# Patient Record
Sex: MALE | Race: WHITE | HISPANIC OR LATINO | Employment: OTHER | URBAN - METROPOLITAN AREA
[De-identification: names, ages, dates, MRNs, and addresses within clinical notes are randomized per-mention and may not be internally consistent; named-entity substitution may affect disease eponyms.]

---

## 2018-08-19 ENCOUNTER — HOSPITAL ENCOUNTER (EMERGENCY)
Facility: MEDICAL CENTER | Age: 58
End: 2018-08-19
Payer: MEDICARE

## 2018-08-19 PROCEDURE — 302449 STATCHG TRIAGE ONLY (STATISTIC)

## 2018-08-19 NOTE — ED NOTES
"Pt in to triage via WC complaining of CP, pt very anxious and uncorropative with the VS procedure. I called for EKG, but pt refused to wait for the procedure. He ripped off his BP cuff, and stated \"I am out of here\". I recommended that the pt stay at least for and EKG, but the pt then stated. \" I recommend that you stop talking\". Will take out of system.  "

## 2018-10-05 ENCOUNTER — HOSPITAL ENCOUNTER (EMERGENCY)
Facility: MEDICAL CENTER | Age: 58
End: 2018-10-05
Attending: EMERGENCY MEDICINE
Payer: MEDICARE

## 2018-10-05 VITALS
HEIGHT: 64 IN | RESPIRATION RATE: 16 BRPM | OXYGEN SATURATION: 99 % | SYSTOLIC BLOOD PRESSURE: 142 MMHG | HEART RATE: 72 BPM | TEMPERATURE: 96.8 F | BODY MASS INDEX: 23.9 KG/M2 | WEIGHT: 140 LBS | DIASTOLIC BLOOD PRESSURE: 98 MMHG

## 2018-10-05 VITALS
HEIGHT: 63 IN | DIASTOLIC BLOOD PRESSURE: 93 MMHG | HEART RATE: 91 BPM | BODY MASS INDEX: 23.44 KG/M2 | TEMPERATURE: 97.7 F | WEIGHT: 132.28 LBS | RESPIRATION RATE: 20 BRPM | OXYGEN SATURATION: 97 % | SYSTOLIC BLOOD PRESSURE: 151 MMHG

## 2018-10-05 DIAGNOSIS — Z00.8 MEDICAL CLEARANCE FOR PSYCHIATRIC ADMISSION: ICD-10-CM

## 2018-10-05 DIAGNOSIS — R45.851 SUICIDAL IDEATION: ICD-10-CM

## 2018-10-05 DIAGNOSIS — F15.10 METHAMPHETAMINE ABUSE (HCC): ICD-10-CM

## 2018-10-05 LAB
AMPHET UR QL SCN: POSITIVE
APAP SERPL-MCNC: <10 UG/ML (ref 10–30)
BARBITURATES UR QL SCN: NEGATIVE
BENZODIAZ UR QL SCN: NEGATIVE
BZE UR QL SCN: NEGATIVE
CANNABINOIDS UR QL SCN: POSITIVE
ETHANOL BLD-MCNC: 0.04 G/DL
METHADONE UR QL SCN: NEGATIVE
OPIATES UR QL SCN: NEGATIVE
OXYCODONE UR QL SCN: NEGATIVE
PCP UR QL SCN: NEGATIVE
POC BREATHALIZER: 0 PERCENT (ref 0–0.01)
POC BREATHALIZER: 0.02 PERCENT (ref 0–0.01)
PROPOXYPH UR QL SCN: NEGATIVE
SALICYLATES SERPL-MCNC: 0 MG/DL (ref 15–25)

## 2018-10-05 PROCEDURE — 80307 DRUG TEST PRSMV CHEM ANLYZR: CPT

## 2018-10-05 PROCEDURE — 302970 POC BREATHALIZER: Performed by: EMERGENCY MEDICINE

## 2018-10-05 PROCEDURE — A9270 NON-COVERED ITEM OR SERVICE: HCPCS | Performed by: EMERGENCY MEDICINE

## 2018-10-05 PROCEDURE — 700102 HCHG RX REV CODE 250 W/ 637 OVERRIDE(OP): Performed by: EMERGENCY MEDICINE

## 2018-10-05 PROCEDURE — 93005 ELECTROCARDIOGRAM TRACING: CPT | Performed by: EMERGENCY MEDICINE

## 2018-10-05 PROCEDURE — 90791 PSYCH DIAGNOSTIC EVALUATION: CPT

## 2018-10-05 PROCEDURE — 99285 EMERGENCY DEPT VISIT HI MDM: CPT

## 2018-10-05 RX ORDER — LORAZEPAM 1 MG/1
0.5 TABLET ORAL ONCE
Status: COMPLETED | OUTPATIENT
Start: 2018-10-05 | End: 2018-10-05

## 2018-10-05 RX ORDER — QUETIAPINE FUMARATE 100 MG/1
100 TABLET, FILM COATED ORAL 3 TIMES DAILY
Status: DISCONTINUED | OUTPATIENT
Start: 2018-10-05 | End: 2018-10-05 | Stop reason: HOSPADM

## 2018-10-05 RX ORDER — LISINOPRIL 10 MG/1
10 TABLET ORAL DAILY
Status: DISCONTINUED | OUTPATIENT
Start: 2018-10-05 | End: 2018-10-05 | Stop reason: HOSPADM

## 2018-10-05 RX ORDER — LISINOPRIL 20 MG/1
20 TABLET ORAL DAILY
Status: ON HOLD | COMMUNITY
End: 2018-12-29

## 2018-10-05 RX ORDER — BUPROPION HYDROCHLORIDE 150 MG/1
300 TABLET, EXTENDED RELEASE ORAL EVERY MORNING
Status: DISCONTINUED | OUTPATIENT
Start: 2018-10-05 | End: 2018-10-05 | Stop reason: HOSPADM

## 2018-10-05 RX ORDER — GINSENG 100 MG
1 CAPSULE ORAL DAILY
COMMUNITY
End: 2018-11-08 | Stop reason: CLARIF

## 2018-10-05 RX ORDER — LORAZEPAM 1 MG/1
1 TABLET ORAL ONCE
Status: COMPLETED | OUTPATIENT
Start: 2018-10-05 | End: 2018-10-05

## 2018-10-05 RX ADMIN — LORAZEPAM 0.5 MG: 1 TABLET ORAL at 04:37

## 2018-10-05 RX ADMIN — LORAZEPAM 1 MG: 1 TABLET ORAL at 12:25

## 2018-10-05 RX ADMIN — QUETIAPINE FUMARATE 100 MG: 100 TABLET ORAL at 13:21

## 2018-10-05 RX ADMIN — LISINOPRIL 10 MG: 10 TABLET ORAL at 12:25

## 2018-10-05 ASSESSMENT — PAIN SCALES - GENERAL
PAINLEVEL_OUTOF10: 0

## 2018-10-05 ASSESSMENT — LIFESTYLE VARIABLES
DO YOU DRINK ALCOHOL: NO
DO YOU DRINK ALCOHOL: YES
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
## 2018-10-05 NOTE — ED NOTES
Patient resting quietly in room. Respirations even and nonlabored. Sitter at bedside. No acute distress noted.

## 2018-10-05 NOTE — ED NOTES
This RN spoke with Zahra from [facility] in regards to patient admission and transfer back to the facility.Zahra from [facility] states she will call back.

## 2018-10-05 NOTE — DISCHARGE PLANNING
MISA spoke with Yesenia in registration who will send someone to register the pt. MISA to send mental health referral upon completion of registration.
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
## 2018-10-05 NOTE — ED NOTES
Patient currently anxious and restless in his bed. Patient took off b/p cuff and spo2 probe off finger. Provider made aware and order for po ativan obtained and administered as ordered.

## 2018-10-05 NOTE — ED NOTES
This RN spoke with nurse liason for nurse to nurse report at [facility] for possible admission. Nurse liason states [facility] will review and get back in touch with [facility].

## 2018-10-05 NOTE — ED PROVIDER NOTES
ED Provider Addendum    1000 -patient has been seen and evaluated by [facility].  Please refer to her note for complete details.  Legal hold is been completed.    12:19 PM contacted by nursing staff.  Patient is becoming somewhat agitated.  He had 0.5 mg of oral Ativan earlier with some improvement.  After reviewing medication reconciliation, I have reordered his home medications to include Seroquel, Wellbutrin as well as lisinopril.  I will add 1 mg of Ativan orally now too.

## 2018-10-05 NOTE — DISCHARGE PLANNING
Pt has been accepted to [facility] by Dr. Braxton.  David called [facility] and set up transport through [facility] for 1400.  David updated [facility], bedside rn, and completed transfer packet.

## 2018-10-05 NOTE — ED NOTES
Patient resting quietly in room. Sitter at bedside. Respirations even and nonlabored, No acutre distress noted at this time.

## 2018-10-05 NOTE — DISCHARGE PLANNING
Medical Social Work    Referral: Legal Hold    Intervention: Legal Hold Paperwork given to SW by [facility] RN: Tiffany    Legal Hold Initiated: Date: 10/05/2018  Time: 0303    Legal Hold faxed: Date: 10/05/2018  Time: 1015    Patient’s Insurance Listed on Face Sheet: Medicare and FFS    Referrals sent to: [facility], [place], [facility], [facility]    Plan: Patient will transfer to mental health facility once acceptance is obtained.     Confirmation of receipt of referral by phone with: fax confirmation

## 2018-10-06 NOTE — ED NOTES
Pt report received, pt has sitter from [facility] at bedside, pt seen by [facility], pt is waiting medical clearance

## 2018-10-06 NOTE — ED NOTES
Pt resting in bed, worker from [facility] at bedside, pt given urinal and instructed to collect urine, pt verbalizes understanding

## 2018-10-06 NOTE — DISCHARGE PLANNING
Alert Team Note: Received report from day shift Alert Team RN Tiffany, stating patient was evaluated earlier today and transferred to [facility] for psychiatric admission. Patient was returned stating patient needed another medical clearance due to sedated appearance. Consulted with ERP who reports patient is medically cleared and can return to [facility] for continued psychiatric treatment. Consulted with Bedside RN and  for assistance in coordinating patient transfer back to [facility]. Informed [facility] employee who is at bedside, that patient will be cleared to return for psychiatric treatment.    Virginia Reynaga , Ph.D.  Alert Team Therapist
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
## 2018-10-06 NOTE — ED NOTES
Pt ate 50% of box lunch  Pt cleared for transfer back to [facility], pt has worker from [facility] at bedside  Pt waiting transport

## 2018-10-06 NOTE — ED NOTES
Patient alert and oriented x4. Respirations even and nonlabored. Patient up by stretcher using urinal. EMS at bedside to transport patient. Patient ambulatory to EMS stretcher.

## 2018-10-06 NOTE — ED NOTES
Pt stood at bedside but was unable to provide urine specimen, pt is awake to verbal he is able to state his name, that he is at [facility] and the year, pt waiting medical clearance then plan is to return pt to [facility]

## 2018-10-06 NOTE — ED NOTES
Pt waiting [facility] transport to [facility], pt resting in bed, respirations even unlabored, pt appears to be sleeping, sitter from [facility] at bedside for pt safety

## 2018-10-06 NOTE — ED NOTES
Report to [facility] pt to [facility] via ambulance transport, pt oriented to person, place and time at time of transfer,pt left via w/c with [facility] transport team

## 2018-10-06 NOTE — ED NOTES
Pt resting in bed, sitter from [facility] is at the bedside for pt safety pt waiting transport back to [facility]

## 2018-10-06 NOTE — DISCHARGE PLANNING
Medical Social Work     MISA was advised by the ERP that the pt is medically clear to return to [facility]. MISA called and spoke with the nursing supervisor Suzi and advised her that the pt is clear to transfer back to there facility. MISA also asked Suzi if they had the original Legal Hold and she verified that they do have the original Legal Hold. MISA called [facility] and set up transportation with Billy for 2300. Transfer packet complete and placed with the hard chart. MISA notified the RN of the transfer time.

## 2018-11-02 ENCOUNTER — HOSPITAL ENCOUNTER (INPATIENT)
Facility: MEDICAL CENTER | Age: 58
LOS: 4 days | DRG: 392 | End: 2018-11-06
Attending: EMERGENCY MEDICINE | Admitting: INTERNAL MEDICINE
Payer: MEDICARE

## 2018-11-02 ENCOUNTER — APPOINTMENT (OUTPATIENT)
Dept: RADIOLOGY | Facility: MEDICAL CENTER | Age: 58
DRG: 392 | End: 2018-11-02
Attending: EMERGENCY MEDICINE
Payer: MEDICARE

## 2018-11-02 ENCOUNTER — APPOINTMENT (OUTPATIENT)
Dept: RADIOLOGY | Facility: MEDICAL CENTER | Age: 58
DRG: 392 | End: 2018-11-02
Attending: INTERNAL MEDICINE
Payer: MEDICARE

## 2018-11-02 LAB
ALBUMIN SERPL BCP-MCNC: 3.6 G/DL (ref 3.2–4.9)
ALBUMIN/GLOB SERPL: 1.8 G/DL
ALP SERPL-CCNC: 84 U/L (ref 30–99)
ALT SERPL-CCNC: 25 U/L (ref 2–50)
ANION GAP SERPL CALC-SCNC: 10 MMOL/L (ref 0–11.9)
APPEARANCE UR: CLEAR
APTT PPP: 30.5 SEC (ref 24.7–36)
AST SERPL-CCNC: 28 U/L (ref 12–45)
BASOPHILS # BLD AUTO: 0.4 % (ref 0–1.8)
BASOPHILS # BLD: 0.07 K/UL (ref 0–0.12)
BILIRUB SERPL-MCNC: 0.6 MG/DL (ref 0.1–1.5)
BILIRUB UR QL STRIP.AUTO: NEGATIVE
BNP SERPL-MCNC: 73 PG/ML (ref 0–100)
BUN SERPL-MCNC: 33 MG/DL (ref 8–22)
CALCIUM SERPL-MCNC: 9 MG/DL (ref 8.5–10.5)
CHLORIDE SERPL-SCNC: 94 MMOL/L (ref 96–112)
CO2 SERPL-SCNC: 23 MMOL/L (ref 20–33)
COLOR UR: YELLOW
CREAT SERPL-MCNC: 1.3 MG/DL (ref 0.5–1.4)
EKG IMPRESSION: NORMAL
EOSINOPHIL # BLD AUTO: 0.14 K/UL (ref 0–0.51)
EOSINOPHIL NFR BLD: 0.8 % (ref 0–6.9)
ERYTHROCYTE [DISTWIDTH] IN BLOOD BY AUTOMATED COUNT: 42.8 FL (ref 35.9–50)
GLOBULIN SER CALC-MCNC: 2 G/DL (ref 1.9–3.5)
GLUCOSE SERPL-MCNC: 199 MG/DL (ref 65–99)
GLUCOSE UR STRIP.AUTO-MCNC: 100 MG/DL
HCT VFR BLD AUTO: 32.8 % (ref 42–52)
HGB BLD-MCNC: 11.5 G/DL (ref 14–18)
IMM GRANULOCYTES # BLD AUTO: 0.11 K/UL (ref 0–0.11)
IMM GRANULOCYTES NFR BLD AUTO: 0.6 % (ref 0–0.9)
INR PPP: 1.08 (ref 0.87–1.13)
KETONES UR STRIP.AUTO-MCNC: ABNORMAL MG/DL
LACTATE BLD-SCNC: 0.8 MMOL/L (ref 0.5–2)
LEUKOCYTE ESTERASE UR QL STRIP.AUTO: NEGATIVE
LIPASE SERPL-CCNC: 121 U/L (ref 11–82)
LYMPHOCYTES # BLD AUTO: 1.52 K/UL (ref 1–4.8)
LYMPHOCYTES NFR BLD: 8.5 % (ref 22–41)
MCH RBC QN AUTO: 31.3 PG (ref 27–33)
MCHC RBC AUTO-ENTMCNC: 35.1 G/DL (ref 33.7–35.3)
MCV RBC AUTO: 89.4 FL (ref 81.4–97.8)
MICRO URNS: ABNORMAL
MONOCYTES # BLD AUTO: 2.19 K/UL (ref 0–0.85)
MONOCYTES NFR BLD AUTO: 12.3 % (ref 0–13.4)
NEUTROPHILS # BLD AUTO: 13.83 K/UL (ref 1.82–7.42)
NEUTROPHILS NFR BLD: 77.4 % (ref 44–72)
NITRITE UR QL STRIP.AUTO: NEGATIVE
NRBC # BLD AUTO: 0 K/UL
NRBC BLD-RTO: 0 /100 WBC
PH UR STRIP.AUTO: 6 [PH]
PLATELET # BLD AUTO: 293 K/UL (ref 164–446)
PMV BLD AUTO: 8.9 FL (ref 9–12.9)
POTASSIUM SERPL-SCNC: 4.6 MMOL/L (ref 3.6–5.5)
PROT SERPL-MCNC: 5.6 G/DL (ref 6–8.2)
PROT UR QL STRIP: NEGATIVE MG/DL
PROTHROMBIN TIME: 14.1 SEC (ref 12–14.6)
RBC # BLD AUTO: 3.67 M/UL (ref 4.7–6.1)
RBC UR QL AUTO: NEGATIVE
SODIUM SERPL-SCNC: 127 MMOL/L (ref 135–145)
SP GR UR STRIP.AUTO: 1.01
T4 FREE SERPL-MCNC: 1.36 NG/DL (ref 0.53–1.43)
TROPONIN I SERPL-MCNC: 0.03 NG/ML (ref 0–0.04)
TROPONIN I SERPL-MCNC: 0.03 NG/ML (ref 0–0.04)
TSH SERPL DL<=0.005 MIU/L-ACNC: 3.52 UIU/ML (ref 0.38–5.33)
UROBILINOGEN UR STRIP.AUTO-MCNC: 0.2 MG/DL
WBC # BLD AUTO: 17.9 K/UL (ref 4.8–10.8)

## 2018-11-02 PROCEDURE — 80053 COMPREHEN METABOLIC PANEL: CPT

## 2018-11-02 PROCEDURE — 81003 URINALYSIS AUTO W/O SCOPE: CPT

## 2018-11-02 PROCEDURE — 83036 HEMOGLOBIN GLYCOSYLATED A1C: CPT

## 2018-11-02 PROCEDURE — 99223 1ST HOSP IP/OBS HIGH 75: CPT | Performed by: INTERNAL MEDICINE

## 2018-11-02 PROCEDURE — 85610 PROTHROMBIN TIME: CPT

## 2018-11-02 PROCEDURE — 76705 ECHO EXAM OF ABDOMEN: CPT

## 2018-11-02 PROCEDURE — 85025 COMPLETE CBC W/AUTO DIFF WBC: CPT

## 2018-11-02 PROCEDURE — 93005 ELECTROCARDIOGRAM TRACING: CPT | Performed by: EMERGENCY MEDICINE

## 2018-11-02 PROCEDURE — 700105 HCHG RX REV CODE 258: Performed by: EMERGENCY MEDICINE

## 2018-11-02 PROCEDURE — 84439 ASSAY OF FREE THYROXINE: CPT

## 2018-11-02 PROCEDURE — 700102 HCHG RX REV CODE 250 W/ 637 OVERRIDE(OP): Performed by: INTERNAL MEDICINE

## 2018-11-02 PROCEDURE — 83690 ASSAY OF LIPASE: CPT

## 2018-11-02 PROCEDURE — 99285 EMERGENCY DEPT VISIT HI MDM: CPT

## 2018-11-02 PROCEDURE — A9270 NON-COVERED ITEM OR SERVICE: HCPCS | Performed by: EMERGENCY MEDICINE

## 2018-11-02 PROCEDURE — A9270 NON-COVERED ITEM OR SERVICE: HCPCS | Performed by: INTERNAL MEDICINE

## 2018-11-02 PROCEDURE — 96375 TX/PRO/DX INJ NEW DRUG ADDON: CPT

## 2018-11-02 PROCEDURE — 83735 ASSAY OF MAGNESIUM: CPT

## 2018-11-02 PROCEDURE — 83605 ASSAY OF LACTIC ACID: CPT

## 2018-11-02 PROCEDURE — 83880 ASSAY OF NATRIURETIC PEPTIDE: CPT

## 2018-11-02 PROCEDURE — 700111 HCHG RX REV CODE 636 W/ 250 OVERRIDE (IP): Performed by: EMERGENCY MEDICINE

## 2018-11-02 PROCEDURE — 84145 PROCALCITONIN (PCT): CPT

## 2018-11-02 PROCEDURE — 87040 BLOOD CULTURE FOR BACTERIA: CPT

## 2018-11-02 PROCEDURE — 770020 HCHG ROOM/CARE - TELE (206)

## 2018-11-02 PROCEDURE — 700105 HCHG RX REV CODE 258: Performed by: INTERNAL MEDICINE

## 2018-11-02 PROCEDURE — 84443 ASSAY THYROID STIM HORMONE: CPT

## 2018-11-02 PROCEDURE — 700111 HCHG RX REV CODE 636 W/ 250 OVERRIDE (IP): Performed by: INTERNAL MEDICINE

## 2018-11-02 PROCEDURE — 700102 HCHG RX REV CODE 250 W/ 637 OVERRIDE(OP): Performed by: EMERGENCY MEDICINE

## 2018-11-02 PROCEDURE — 71045 X-RAY EXAM CHEST 1 VIEW: CPT

## 2018-11-02 PROCEDURE — 84484 ASSAY OF TROPONIN QUANT: CPT

## 2018-11-02 PROCEDURE — 96374 THER/PROPH/DIAG INJ IV PUSH: CPT

## 2018-11-02 PROCEDURE — 93005 ELECTROCARDIOGRAM TRACING: CPT

## 2018-11-02 PROCEDURE — 85730 THROMBOPLASTIN TIME PARTIAL: CPT

## 2018-11-02 PROCEDURE — 36415 COLL VENOUS BLD VENIPUNCTURE: CPT

## 2018-11-02 RX ORDER — AMOXICILLIN 250 MG
2 CAPSULE ORAL 2 TIMES DAILY
Status: DISCONTINUED | OUTPATIENT
Start: 2018-11-03 | End: 2018-11-06 | Stop reason: HOSPADM

## 2018-11-02 RX ORDER — SIMVASTATIN 20 MG
20 TABLET ORAL EVERY EVENING
Status: DISCONTINUED | OUTPATIENT
Start: 2018-11-02 | End: 2018-11-06 | Stop reason: HOSPADM

## 2018-11-02 RX ORDER — BISACODYL 10 MG
10 SUPPOSITORY, RECTAL RECTAL
Status: DISCONTINUED | OUTPATIENT
Start: 2018-11-02 | End: 2018-11-06 | Stop reason: HOSPADM

## 2018-11-02 RX ORDER — ACETAMINOPHEN 325 MG/1
650 TABLET ORAL EVERY 6 HOURS PRN
Status: DISCONTINUED | OUTPATIENT
Start: 2018-11-02 | End: 2018-11-06 | Stop reason: HOSPADM

## 2018-11-02 RX ORDER — POLYETHYLENE GLYCOL 3350 17 G/17G
1 POWDER, FOR SOLUTION ORAL
Status: DISCONTINUED | OUTPATIENT
Start: 2018-11-02 | End: 2018-11-06 | Stop reason: HOSPADM

## 2018-11-02 RX ORDER — ONDANSETRON 2 MG/ML
4 INJECTION INTRAMUSCULAR; INTRAVENOUS ONCE
Status: COMPLETED | OUTPATIENT
Start: 2018-11-02 | End: 2018-11-02

## 2018-11-02 RX ORDER — MORPHINE SULFATE 4 MG/ML
4 INJECTION, SOLUTION INTRAMUSCULAR; INTRAVENOUS ONCE
Status: COMPLETED | OUTPATIENT
Start: 2018-11-02 | End: 2018-11-02

## 2018-11-02 RX ORDER — DEXTROSE MONOHYDRATE 25 G/50ML
25 INJECTION, SOLUTION INTRAVENOUS
Status: DISCONTINUED | OUTPATIENT
Start: 2018-11-02 | End: 2018-11-06 | Stop reason: HOSPADM

## 2018-11-02 RX ORDER — SODIUM CHLORIDE 9 MG/ML
500 INJECTION, SOLUTION INTRAVENOUS ONCE
Status: COMPLETED | OUTPATIENT
Start: 2018-11-02 | End: 2018-11-02

## 2018-11-02 RX ORDER — ONDANSETRON 4 MG/1
4 TABLET, ORALLY DISINTEGRATING ORAL EVERY 4 HOURS PRN
Status: DISCONTINUED | OUTPATIENT
Start: 2018-11-02 | End: 2018-11-06 | Stop reason: HOSPADM

## 2018-11-02 RX ORDER — SODIUM CHLORIDE 9 MG/ML
INJECTION, SOLUTION INTRAVENOUS CONTINUOUS
Status: DISCONTINUED | OUTPATIENT
Start: 2018-11-02 | End: 2018-11-04

## 2018-11-02 RX ORDER — SODIUM CHLORIDE 9 MG/ML
500 INJECTION, SOLUTION INTRAVENOUS
Status: DISCONTINUED | OUTPATIENT
Start: 2018-11-02 | End: 2018-11-06 | Stop reason: HOSPADM

## 2018-11-02 RX ORDER — ONDANSETRON 2 MG/ML
4 INJECTION INTRAMUSCULAR; INTRAVENOUS EVERY 4 HOURS PRN
Status: DISCONTINUED | OUTPATIENT
Start: 2018-11-02 | End: 2018-11-06 | Stop reason: HOSPADM

## 2018-11-02 RX ORDER — BUPROPION HYDROCHLORIDE 150 MG/1
150 TABLET, EXTENDED RELEASE ORAL 2 TIMES DAILY
Status: DISCONTINUED | OUTPATIENT
Start: 2018-11-02 | End: 2018-11-06 | Stop reason: HOSPADM

## 2018-11-02 RX ORDER — CLOPIDOGREL BISULFATE 75 MG/1
75 TABLET ORAL DAILY
Status: DISCONTINUED | OUTPATIENT
Start: 2018-11-03 | End: 2018-11-06 | Stop reason: HOSPADM

## 2018-11-02 RX ORDER — QUETIAPINE FUMARATE 25 MG/1
100 TABLET, FILM COATED ORAL 3 TIMES DAILY
Status: DISCONTINUED | OUTPATIENT
Start: 2018-11-03 | End: 2018-11-05

## 2018-11-02 RX ORDER — LISINOPRIL 20 MG/1
20 TABLET ORAL DAILY
Status: DISCONTINUED | OUTPATIENT
Start: 2018-11-03 | End: 2018-11-06 | Stop reason: HOSPADM

## 2018-11-02 RX ORDER — OXYCODONE HCL 10 MG/1
10 TABLET, FILM COATED, EXTENDED RELEASE ORAL EVERY 12 HOURS
Status: DISCONTINUED | OUTPATIENT
Start: 2018-11-02 | End: 2018-11-05

## 2018-11-02 RX ORDER — ACETAMINOPHEN 500 MG
500 TABLET ORAL
COMMUNITY
End: 2018-11-08 | Stop reason: CLARIF

## 2018-11-02 RX ADMIN — OXYCODONE HYDROCHLORIDE 10 MG: 10 TABLET, FILM COATED, EXTENDED RELEASE ORAL at 23:41

## 2018-11-02 RX ADMIN — SIMVASTATIN 20 MG: 20 TABLET, FILM COATED ORAL at 23:41

## 2018-11-02 RX ADMIN — SODIUM CHLORIDE: 9 INJECTION, SOLUTION INTRAVENOUS at 23:42

## 2018-11-02 RX ADMIN — LIDOCAINE HYDROCHLORIDE 30 ML: 20 SOLUTION OROPHARYNGEAL at 19:12

## 2018-11-02 RX ADMIN — ACETAMINOPHEN 650 MG: 325 TABLET, FILM COATED ORAL at 23:41

## 2018-11-02 RX ADMIN — ONDANSETRON HYDROCHLORIDE 4 MG: 2 SOLUTION INTRAMUSCULAR; INTRAVENOUS at 18:13

## 2018-11-02 RX ADMIN — BUPROPION HYDROCHLORIDE 150 MG: 150 TABLET, EXTENDED RELEASE ORAL at 23:41

## 2018-11-02 RX ADMIN — ONDANSETRON 4 MG: 2 INJECTION INTRAMUSCULAR; INTRAVENOUS at 23:41

## 2018-11-02 RX ADMIN — SODIUM CHLORIDE 500 ML: 9 INJECTION, SOLUTION INTRAVENOUS at 19:58

## 2018-11-02 RX ADMIN — MORPHINE SULFATE 4 MG: 4 INJECTION INTRAVENOUS at 18:13

## 2018-11-02 ASSESSMENT — COGNITIVE AND FUNCTIONAL STATUS - GENERAL
MOBILITY SCORE: 24
SUGGESTED CMS G CODE MODIFIER MOBILITY: CH
SUGGESTED CMS G CODE MODIFIER DAILY ACTIVITY: CH
DAILY ACTIVITIY SCORE: 24
SUGGESTED CMS G CODE MODIFIER DAILY ACTIVITY: CH
DAILY ACTIVITIY SCORE: 24

## 2018-11-02 ASSESSMENT — PAIN SCALES - GENERAL
PAINLEVEL_OUTOF10: 8
PAINLEVEL_OUTOF10: 6

## 2018-11-02 ASSESSMENT — COPD QUESTIONNAIRES
DURING THE PAST 4 WEEKS HOW MUCH DID YOU FEEL SHORT OF BREATH: NONE/LITTLE OF THE TIME
COPD SCREENING SCORE: 3
HAVE YOU SMOKED AT LEAST 100 CIGARETTES IN YOUR ENTIRE LIFE: YES
DO YOU EVER COUGH UP ANY MUCUS OR PHLEGM?: NO/ONLY WITH OCCASIONAL COLDS OR INFECTIONS

## 2018-11-02 ASSESSMENT — LIFESTYLE VARIABLES
EVER_SMOKED: YES
EVER_SMOKED: YES

## 2018-11-02 ASSESSMENT — PATIENT HEALTH QUESTIONNAIRE - PHQ9
1. LITTLE INTEREST OR PLEASURE IN DOING THINGS: NOT AT ALL
2. FEELING DOWN, DEPRESSED, IRRITABLE, OR HOPELESS: NOT AT ALL
SUM OF ALL RESPONSES TO PHQ9 QUESTIONS 1 AND 2: 0

## 2018-11-02 ASSESSMENT — PAIN DESCRIPTION - DESCRIPTORS: DESCRIPTORS: SHARP

## 2018-11-03 ENCOUNTER — APPOINTMENT (OUTPATIENT)
Dept: CARDIOLOGY | Facility: MEDICAL CENTER | Age: 58
DRG: 392 | End: 2018-11-03
Attending: INTERNAL MEDICINE
Payer: MEDICARE

## 2018-11-03 ENCOUNTER — APPOINTMENT (OUTPATIENT)
Dept: RADIOLOGY | Facility: MEDICAL CENTER | Age: 58
DRG: 392 | End: 2018-11-03
Attending: HOSPITALIST
Payer: MEDICARE

## 2018-11-03 PROBLEM — F43.10 PTSD (POST-TRAUMATIC STRESS DISORDER): Status: ACTIVE | Noted: 2018-11-03

## 2018-11-03 PROBLEM — R07.9 CHEST PAIN: Status: ACTIVE | Noted: 2018-11-03

## 2018-11-03 PROBLEM — I10 HYPERTENSION: Status: ACTIVE | Noted: 2018-11-03

## 2018-11-03 PROBLEM — I25.10 CAD (CORONARY ARTERY DISEASE): Status: ACTIVE | Noted: 2018-11-03

## 2018-11-03 PROBLEM — K85.90 ACUTE PANCREATITIS: Status: ACTIVE | Noted: 2018-11-03

## 2018-11-03 PROBLEM — D72.829 LEUKOCYTOSIS: Status: ACTIVE | Noted: 2018-11-03

## 2018-11-03 PROBLEM — E87.1 HYPONATREMIA: Status: ACTIVE | Noted: 2018-11-03

## 2018-11-03 LAB
ANION GAP SERPL CALC-SCNC: 4 MMOL/L (ref 0–11.9)
BASOPHILS # BLD AUTO: 0.6 % (ref 0–1.8)
BASOPHILS # BLD: 0.06 K/UL (ref 0–0.12)
BUN SERPL-MCNC: 17 MG/DL (ref 8–22)
CALCIUM SERPL-MCNC: 8.1 MG/DL (ref 8.5–10.5)
CHLORIDE SERPL-SCNC: 101 MMOL/L (ref 96–112)
CO2 SERPL-SCNC: 26 MMOL/L (ref 20–33)
CREAT SERPL-MCNC: 1.1 MG/DL (ref 0.5–1.4)
EKG IMPRESSION: NORMAL
EOSINOPHIL # BLD AUTO: 0.26 K/UL (ref 0–0.51)
EOSINOPHIL NFR BLD: 2.5 % (ref 0–6.9)
ERYTHROCYTE [DISTWIDTH] IN BLOOD BY AUTOMATED COUNT: 45.7 FL (ref 35.9–50)
EST. AVERAGE GLUCOSE BLD GHB EST-MCNC: 180 MG/DL
GLUCOSE BLD-MCNC: 130 MG/DL (ref 65–99)
GLUCOSE BLD-MCNC: 191 MG/DL (ref 65–99)
GLUCOSE BLD-MCNC: 216 MG/DL (ref 65–99)
GLUCOSE SERPL-MCNC: 147 MG/DL (ref 65–99)
HBA1C MFR BLD: 7.9 % (ref 0–5.6)
HCT VFR BLD AUTO: 32.9 % (ref 42–52)
HGB BLD-MCNC: 11 G/DL (ref 14–18)
IMM GRANULOCYTES # BLD AUTO: 0.05 K/UL (ref 0–0.11)
IMM GRANULOCYTES NFR BLD AUTO: 0.5 % (ref 0–0.9)
LIPASE SERPL-CCNC: 266 U/L (ref 11–82)
LYMPHOCYTES # BLD AUTO: 1.76 K/UL (ref 1–4.8)
LYMPHOCYTES NFR BLD: 17.2 % (ref 22–41)
MAGNESIUM SERPL-MCNC: 2.1 MG/DL (ref 1.5–2.5)
MCH RBC QN AUTO: 30.8 PG (ref 27–33)
MCHC RBC AUTO-ENTMCNC: 33.4 G/DL (ref 33.7–35.3)
MCV RBC AUTO: 92.2 FL (ref 81.4–97.8)
MONOCYTES # BLD AUTO: 1.14 K/UL (ref 0–0.85)
MONOCYTES NFR BLD AUTO: 11.1 % (ref 0–13.4)
NEUTROPHILS # BLD AUTO: 6.98 K/UL (ref 1.82–7.42)
NEUTROPHILS NFR BLD: 68.1 % (ref 44–72)
NRBC # BLD AUTO: 0 K/UL
NRBC BLD-RTO: 0 /100 WBC
PLATELET # BLD AUTO: 273 K/UL (ref 164–446)
PMV BLD AUTO: 8.8 FL (ref 9–12.9)
POTASSIUM SERPL-SCNC: 4 MMOL/L (ref 3.6–5.5)
PROCALCITONIN SERPL-MCNC: 0.11 NG/ML
RBC # BLD AUTO: 3.57 M/UL (ref 4.7–6.1)
SODIUM SERPL-SCNC: 131 MMOL/L (ref 135–145)
TROPONIN I SERPL-MCNC: 0.02 NG/ML (ref 0–0.04)
WBC # BLD AUTO: 10.3 K/UL (ref 4.8–10.8)

## 2018-11-03 PROCEDURE — 99233 SBSQ HOSP IP/OBS HIGH 50: CPT | Performed by: HOSPITALIST

## 2018-11-03 PROCEDURE — 700102 HCHG RX REV CODE 250 W/ 637 OVERRIDE(OP): Performed by: HOSPITALIST

## 2018-11-03 PROCEDURE — 700111 HCHG RX REV CODE 636 W/ 250 OVERRIDE (IP)

## 2018-11-03 PROCEDURE — 93306 TTE W/DOPPLER COMPLETE: CPT | Mod: 26 | Performed by: INTERNAL MEDICINE

## 2018-11-03 PROCEDURE — 700111 HCHG RX REV CODE 636 W/ 250 OVERRIDE (IP): Performed by: INTERNAL MEDICINE

## 2018-11-03 PROCEDURE — 770020 HCHG ROOM/CARE - TELE (206)

## 2018-11-03 PROCEDURE — 90686 IIV4 VACC NO PRSV 0.5 ML IM: CPT | Performed by: HOSPITALIST

## 2018-11-03 PROCEDURE — 90732 PPSV23 VACC 2 YRS+ SUBQ/IM: CPT | Performed by: HOSPITALIST

## 2018-11-03 PROCEDURE — 700111 HCHG RX REV CODE 636 W/ 250 OVERRIDE (IP): Performed by: HOSPITALIST

## 2018-11-03 PROCEDURE — 700105 HCHG RX REV CODE 258: Performed by: INTERNAL MEDICINE

## 2018-11-03 PROCEDURE — 93010 ELECTROCARDIOGRAM REPORT: CPT | Performed by: INTERNAL MEDICINE

## 2018-11-03 PROCEDURE — 3E0234Z INTRODUCTION OF SERUM, TOXOID AND VACCINE INTO MUSCLE, PERCUTANEOUS APPROACH: ICD-10-PCS | Performed by: HOSPITALIST

## 2018-11-03 PROCEDURE — 74177 CT ABD & PELVIS W/CONTRAST: CPT

## 2018-11-03 PROCEDURE — 80048 BASIC METABOLIC PNL TOTAL CA: CPT

## 2018-11-03 PROCEDURE — 85025 COMPLETE CBC W/AUTO DIFF WBC: CPT

## 2018-11-03 PROCEDURE — 93306 TTE W/DOPPLER COMPLETE: CPT

## 2018-11-03 PROCEDURE — 83690 ASSAY OF LIPASE: CPT

## 2018-11-03 PROCEDURE — 3E02340 INTRODUCTION OF INFLUENZA VACCINE INTO MUSCLE, PERCUTANEOUS APPROACH: ICD-10-PCS | Performed by: HOSPITALIST

## 2018-11-03 PROCEDURE — A9502 TC99M TETROFOSMIN: HCPCS

## 2018-11-03 PROCEDURE — 93005 ELECTROCARDIOGRAM TRACING: CPT | Performed by: INTERNAL MEDICINE

## 2018-11-03 PROCEDURE — A9270 NON-COVERED ITEM OR SERVICE: HCPCS | Performed by: HOSPITALIST

## 2018-11-03 PROCEDURE — A9270 NON-COVERED ITEM OR SERVICE: HCPCS | Performed by: INTERNAL MEDICINE

## 2018-11-03 PROCEDURE — 84484 ASSAY OF TROPONIN QUANT: CPT

## 2018-11-03 PROCEDURE — 700117 HCHG RX CONTRAST REV CODE 255: Performed by: HOSPITALIST

## 2018-11-03 PROCEDURE — 36415 COLL VENOUS BLD VENIPUNCTURE: CPT

## 2018-11-03 PROCEDURE — 82962 GLUCOSE BLOOD TEST: CPT | Mod: 91

## 2018-11-03 PROCEDURE — 700102 HCHG RX REV CODE 250 W/ 637 OVERRIDE(OP): Performed by: INTERNAL MEDICINE

## 2018-11-03 PROCEDURE — 90471 IMMUNIZATION ADMIN: CPT

## 2018-11-03 RX ORDER — HEPARIN SODIUM 5000 [USP'U]/ML
5000 INJECTION, SOLUTION INTRAVENOUS; SUBCUTANEOUS EVERY 8 HOURS
Status: DISCONTINUED | OUTPATIENT
Start: 2018-11-03 | End: 2018-11-06 | Stop reason: HOSPADM

## 2018-11-03 RX ORDER — REGADENOSON 0.08 MG/ML
INJECTION, SOLUTION INTRAVENOUS
Status: COMPLETED
Start: 2018-11-03 | End: 2018-11-03

## 2018-11-03 RX ORDER — NITROGLYCERIN 0.4 MG/1
0.4 TABLET SUBLINGUAL
Status: DISCONTINUED | OUTPATIENT
Start: 2018-11-03 | End: 2018-11-06 | Stop reason: HOSPADM

## 2018-11-03 RX ORDER — HYDROCODONE BITARTRATE AND ACETAMINOPHEN 5; 325 MG/1; MG/1
1-2 TABLET ORAL EVERY 6 HOURS PRN
Status: DISCONTINUED | OUTPATIENT
Start: 2018-11-03 | End: 2018-11-05

## 2018-11-03 RX ADMIN — INSULIN HUMAN 2 UNITS: 100 INJECTION, SOLUTION PARENTERAL at 17:49

## 2018-11-03 RX ADMIN — SIMVASTATIN 20 MG: 20 TABLET, FILM COATED ORAL at 17:45

## 2018-11-03 RX ADMIN — HEPARIN SODIUM 5000 UNITS: 5000 INJECTION, SOLUTION INTRAVENOUS; SUBCUTANEOUS at 14:43

## 2018-11-03 RX ADMIN — INSULIN HUMAN 2 UNITS: 100 INJECTION, SOLUTION PARENTERAL at 09:33

## 2018-11-03 RX ADMIN — CLOPIDOGREL 75 MG: 75 TABLET, FILM COATED ORAL at 05:45

## 2018-11-03 RX ADMIN — SODIUM CHLORIDE: 9 INJECTION, SOLUTION INTRAVENOUS at 22:38

## 2018-11-03 RX ADMIN — BUPROPION HYDROCHLORIDE 150 MG: 150 TABLET, EXTENDED RELEASE ORAL at 05:45

## 2018-11-03 RX ADMIN — QUETIAPINE FUMARATE 100 MG: 25 TABLET ORAL at 05:45

## 2018-11-03 RX ADMIN — REGADENOSON 0.4 MG: 0.08 INJECTION, SOLUTION INTRAVENOUS at 13:53

## 2018-11-03 RX ADMIN — LISINOPRIL 20 MG: 20 TABLET ORAL at 05:45

## 2018-11-03 RX ADMIN — OXYCODONE HYDROCHLORIDE 10 MG: 10 TABLET, FILM COATED, EXTENDED RELEASE ORAL at 17:46

## 2018-11-03 RX ADMIN — HEPARIN SODIUM 5000 UNITS: 5000 INJECTION, SOLUTION INTRAVENOUS; SUBCUTANEOUS at 05:46

## 2018-11-03 RX ADMIN — BUPROPION HYDROCHLORIDE 150 MG: 150 TABLET, EXTENDED RELEASE ORAL at 17:45

## 2018-11-03 RX ADMIN — SODIUM CHLORIDE: 9 INJECTION, SOLUTION INTRAVENOUS at 09:40

## 2018-11-03 RX ADMIN — OXYCODONE HYDROCHLORIDE 10 MG: 10 TABLET, FILM COATED, EXTENDED RELEASE ORAL at 05:44

## 2018-11-03 RX ADMIN — HEPARIN SODIUM 5000 UNITS: 5000 INJECTION, SOLUTION INTRAVENOUS; SUBCUTANEOUS at 21:24

## 2018-11-03 RX ADMIN — IOHEXOL 100 ML: 350 INJECTION, SOLUTION INTRAVENOUS at 19:16

## 2018-11-03 RX ADMIN — HYDROCODONE BITARTRATE AND ACETAMINOPHEN 1 TABLET: 5; 325 TABLET ORAL at 20:08

## 2018-11-03 RX ADMIN — QUETIAPINE FUMARATE 100 MG: 25 TABLET ORAL at 11:03

## 2018-11-03 RX ADMIN — QUETIAPINE FUMARATE 100 MG: 25 TABLET ORAL at 17:45

## 2018-11-03 RX ADMIN — ASPIRIN 81 MG: 81 TABLET, COATED ORAL at 05:45

## 2018-11-03 RX ADMIN — PNEUMOCOCCAL VACCINE POLYVALENT 25 MCG
25; 25; 25; 25; 25; 25; 25; 25; 25; 25; 25; 25; 25; 25; 25; 25; 25; 25; 25; 25; 25; 25; 25 INJECTION, SOLUTION INTRAMUSCULAR; SUBCUTANEOUS at 11:03

## 2018-11-03 RX ADMIN — INSULIN HUMAN 1 UNITS: 100 INJECTION, SOLUTION PARENTERAL at 22:06

## 2018-11-03 RX ADMIN — INFLUENZA A VIRUS A/MICHIGAN/45/2015 X-275 (H1N1) ANTIGEN (FORMALDEHYDE INACTIVATED), INFLUENZA A VIRUS A/SINGAPORE/INFIMH-16-0019/2016 IVR-186 (H3N2) ANTIGEN (FORMALDEHYDE INACTIVATED), INFLUENZA B VIRUS B/PHUKET/3073/2013 ANTIGEN (FORMALDEHYDE INACTIVATED), AND INFLUENZA B VIRUS B/MARYLAND/15/2016 BX-69A ANTIGEN (FORMALDEHYDE INACTIVATED) 0.5 ML: 15; 15; 15; 15 INJECTION, SUSPENSION INTRAMUSCULAR at 09:37

## 2018-11-03 ASSESSMENT — ENCOUNTER SYMPTOMS
HEADACHES: 0
FOCAL WEAKNESS: 0
SPUTUM PRODUCTION: 0
MYALGIAS: 0
CONSTIPATION: 0
DEPRESSION: 0
VOMITING: 0
SPEECH CHANGE: 0
FEVER: 0
EYE DISCHARGE: 0
HEMOPTYSIS: 0
BRUISES/BLEEDS EASILY: 0
WHEEZING: 0
SENSORY CHANGE: 0
NECK PAIN: 0
SHORTNESS OF BREATH: 0
BLOOD IN STOOL: 0
BLURRED VISION: 0
PALPITATIONS: 0
CHILLS: 0
BACK PAIN: 0
WEAKNESS: 0
EYE PAIN: 0
SEIZURES: 0
DIZZINESS: 0
CLAUDICATION: 0
LOSS OF CONSCIOUSNESS: 0
NAUSEA: 0
DIAPHORESIS: 0
FLANK PAIN: 0
ABDOMINAL PAIN: 1
COUGH: 0
SORE THROAT: 0
DIARRHEA: 0

## 2018-11-03 ASSESSMENT — PAIN SCALES - GENERAL
PAINLEVEL_OUTOF10: 4
PAINLEVEL_OUTOF10: 6
PAINLEVEL_OUTOF10: 4
PAINLEVEL_OUTOF10: 6
PAINLEVEL_OUTOF10: 4
PAINLEVEL_OUTOF10: 7

## 2018-11-03 ASSESSMENT — PATIENT HEALTH QUESTIONNAIRE - PHQ9
2. FEELING DOWN, DEPRESSED, IRRITABLE, OR HOPELESS: NOT AT ALL
1. LITTLE INTEREST OR PLEASURE IN DOING THINGS: NOT AT ALL
SUM OF ALL RESPONSES TO PHQ9 QUESTIONS 1 AND 2: 0

## 2018-11-03 ASSESSMENT — LIFESTYLE VARIABLES: SUBSTANCE_ABUSE: 0

## 2018-11-03 NOTE — ED NOTES
Med Rec Updated and Complete per Pt at bedside  Allergies Reviewed and Updated (RXN to Penicillins)  No PO ABX last 30 days    Pt states he was able to obtain his medications since the last time he was here.

## 2018-11-03 NOTE — ED PROVIDER NOTES
ED Provider Note    CHIEF COMPLAINT  Chief Complaint   Patient presents with   • Chest Pain     x 2 hours       HPI  Rajiv Sam is a 57 y.o. male who presents with left-sided chest pain that is nonradiating starting about 2-3 hours prior to arrival.  Took EMS here for further evaluation.  Was at [facility] eating at the time of onset.  Was not with exertion.  Reports prior history of CAD and recent stent procedure at [place] about 2 months ago.  Has been compliant with his medications though earlier today lost his medications with his backpack.  Patient continues to smoke cigarettes.  Denies any alcohol use today.  Denies vomiting.  No fevers or recent illness.  No shortness of breath.  No abdominal pain.  No constipation or diarrhea.  No bloody stool or black stool.  No dysuria or hematuria or back pain.  No history of trauma.    The patient was given aspirin prior to arrival by EMS, 324 mg chewable.    REVIEW OF SYSTEMS  See HPI for further details. All other systems are negative.     PAST MEDICAL HISTORY   has a past medical history of Hypertension; Old MI (myocardial infarction); Psychiatric disorder; and PTSD (post-traumatic stress disorder).    SOCIAL HISTORY  Social History     Social History Main Topics   • Smoking status: Current Every Day Smoker   • Smokeless tobacco: Never Used   • Alcohol use No   • Drug use: Yes     Types: Inhaled      Comment: pot   • Sexual activity: Not on file       SURGICAL HISTORY  patient denies any surgical history    CURRENT MEDICATIONS  Home Medications     Reviewed by Rima Abbott R.N. (Registered Nurse) on 11/02/18 at 1735  Med List Status: Not Addressed   Medication Last Dose Status   aspirin EC (ECOTRIN) 81 MG Tablet Delayed Response  Active   buPROPion (WELLBUTRIN XL) 300 MG XL tablet 2 weeks Active   lisinopril (PRINIVIL) 20 MG Tab  Active   oxyCODONE CR (OXYCONTIN) 10 MG Tablet Extended Release 12 hour Abuse-Deterrent 2 weeks Active   quetiapine  (SEROQUEL) 100 MG Tab 2 weeks Active   Zinc 50 MG Tab  Active                ALLERGIES  Allergies   Allergen Reactions   • Pcn [Penicillins]        PHYSICAL EXAM  VITAL SIGNS: /86   Pulse (!) 108   Temp 37.3 °C (99.1 °F)   Resp 20   Wt 62 kg (136 lb 11 oz)   SpO2 99%   BMI 23.46 kg/m²   Pulse ox interpretation: I interpret this pulse ox as normal.  Constitutional: Alert in no apparent distress.  HENT: No signs of trauma, Bilateral external ears normal, Nose normal.   Eyes: Pupils are equal and reactive, Conjunctiva normal, Non-icteric.   Neck: Normal range of motion, No tenderness, Supple, No stridor.   Cardiovascular: Regular rate and rhythm, no murmurs.   Thorax & Lungs: Normal breath sounds, No respiratory distress, No wheezing, No chest tenderness.   Abdomen: Bowel sounds normal, Soft, No tenderness, No masses, No pulsatile masses. No peritoneal signs.  Skin: Warm, Dry, No erythema, No rash.   Back: No bony tenderness, No CVA tenderness.   Extremities: Intact distal pulses, No edema, No tenderness, No cyanosis  Musculoskeletal: Good range of motion in all major joints. No tenderness to palpation or major deformities noted.   Neurologic: Alert , Normal motor function and gait, Normal sensory function, No focal deficits noted.       DIAGNOSTIC STUDIES / PROCEDURES    EKG  11/2/2018 at 5:32 PM  Sinus tachycardia 103  MS, QRS, QTC within normal limits  Incomplete right bundle branch block morphology  Inferior Q waves  No T wave abnormalities  No ST elevations or depressions  Unchanged from prior EKG on 10/5/2018    LABS  Labs Reviewed   CBC WITH DIFFERENTIAL - Abnormal; Notable for the following:        Result Value    WBC 17.9 (*)     RBC 3.67 (*)     Hemoglobin 11.5 (*)     Hematocrit 32.8 (*)     MPV 8.9 (*)     Neutrophils-Polys 77.40 (*)     Lymphocytes 8.50 (*)     Neutrophils (Absolute) 13.83 (*)     Monos (Absolute) 2.19 (*)     All other components within normal limits    Narrative:     Indicate  which anticoagulants the patient is on:->UNKNOWN   COMP METABOLIC PANEL - Abnormal; Notable for the following:     Sodium 127 (*)     Chloride 94 (*)     Glucose 199 (*)     Bun 33 (*)     Total Protein 5.6 (*)     All other components within normal limits    Narrative:     Indicate which anticoagulants the patient is on:->UNKNOWN   LIPASE - Abnormal; Notable for the following:     Lipase 121 (*)     All other components within normal limits    Narrative:     Indicate which anticoagulants the patient is on:->UNKNOWN   ESTIMATED GFR - Abnormal; Notable for the following:     GFR If Non  57 (*)     All other components within normal limits    Narrative:     Indicate which anticoagulants the patient is on:->UNKNOWN   URINALYSIS - Abnormal; Notable for the following:     Glucose 100 (*)     Ketones Trace (*)     All other components within normal limits    Narrative:     If not done within the last 24 hours       RADIOLOGY  DX-CHEST-LIMITED (1 VIEW)   Final Result         No acute cardiac or pulmonary abnormality is identified.            COURSE & MEDICAL DECISION MAKING  Pertinent Labs & Imaging studies reviewed. (See chart for details)  57 y.o. with left-sided chest pain.  Associated with exertion and no evidence of vomiting.  Denies abdominal pain symptoms.  [place] of symptoms.  He has history of CAD and recent stenting procedure.  Notes that he has been compliant with his Plavix.  Was able to review outside medical records from [place] and the patient did have drug-eluting stent placement in August.  Concern for possible ACS.  Chest x-ray is unremarkable.  No evidence of pneumonia.  No obvious pulmonary findings.  No risk factors for PE.  May be gastric/pancreatic in nature as well such as gastritis, peptic ulcer disease, pancreatitis.  Laboratory studies showing negative troponin.  Did receive aspirin prior to arrival.  EKG without acute ischemic findings.   Unchanged from prior.    Spoke with the hospitalist regarding the admission.  Agreeable with admission.      FINAL IMPRESSION  Chest pain        Electronically signed by: Steve Sotelo, 11/2/2018 5:52 PM

## 2018-11-03 NOTE — ASSESSMENT & PLAN NOTE
Rule out ACS  Continuous cardiac monitoring with serial EKG and troponin  Nuclear medicine cardiac stress test negative  Patient has been given full dose of aspirin  TSH and hemoglobin A1c  Nitro when necessary for chest pain  Check 2D echo

## 2018-11-03 NOTE — ED NOTES
Pt c/o indigestion,  Order received and pt medicated per MAR,  Denies other needs,  Call light in reach

## 2018-11-03 NOTE — H&P
Hospital Medicine History & Physical Note    Date of Service  11/2/2018    Primary Care Physician  Pcp Pt States None    Consultants  None    Code Status  Full code    Chief Complaint  Chest pain    History of Presenting Illness  57 y.o. male with a past medical history of CAD status post stent placement x3 who presented 11/2/2018 with chest pain that started earlier today.  The patient reports left-sided sharp chest pain with radiation to his left shoulder worse with deep breathing.  He denies any relieving factors.  He denies any fevers or chills.  He denies any cough, lightheadedness, nausea, vomiting or diarrhea.  He reports some epigastric and left upper quadrant abdominal pain.  He denies any relieving or exacerbating factors.  He denies drinking alcohol.  He continues to smoke less than half a pack cigarettes per day.  He has been compliant with all his medications.    EKG interpreted by me reveals sinus rhythm with RBBB and LPFB with Q waves in inferior leads  Chest x-ray interpreted by me reveals no acute cardiopulmonary process    Review of Systems  Review of Systems   Constitutional: Positive for malaise/fatigue. Negative for chills, diaphoresis and fever.   HENT: Negative for hearing loss and sore throat.    Eyes: Negative for blurred vision.   Respiratory: Negative for cough, sputum production, shortness of breath and wheezing.    Cardiovascular: Positive for chest pain. Negative for palpitations and leg swelling.   Gastrointestinal: Positive for abdominal pain. Negative for blood in stool, diarrhea, nausea and vomiting.   Genitourinary: Negative for dysuria, flank pain and urgency.   Musculoskeletal: Negative for back pain, joint pain, myalgias and neck pain.   Skin: Negative for rash.   Neurological: Negative for dizziness, focal weakness, seizures and headaches.   Endo/Heme/Allergies: Does not bruise/bleed easily.   Psychiatric/Behavioral: Negative for suicidal ideas.   All other systems reviewed and  are negative.      Past Medical History   has a past medical history of Hypertension; Old MI (myocardial infarction); Psychiatric disorder; and PTSD (post-traumatic stress disorder).    Surgical History  No pertinent surgical history    Family History  No pertinent family history    Social History   reports that he has been smoking.  He has never used smokeless tobacco. He reports that he uses drugs, including Inhaled. He reports that he does not drink alcohol.    Allergies  Allergies   Allergen Reactions   • Pcn [Penicillins]        Medications  Prior to Admission Medications   Prescriptions Last Dose Informant Patient Reported? Taking?   Zinc 50 MG Tab  Patient Yes No   Sig: Take 1 Tab by mouth every day.   aspirin EC (ECOTRIN) 81 MG Tablet Delayed Response  Patient Yes No   Sig: Take 81 mg by mouth every day.   buPROPion (WELLBUTRIN XL) 300 MG XL tablet 2 weeks at ran out Patient Yes No   Sig: Take 300 mg by mouth every morning.   lisinopril (PRINIVIL) 20 MG Tab  Patient Yes No   Sig: Take 20 mg by mouth every day.   oxyCODONE CR (OXYCONTIN) 10 MG Tablet Extended Release 12 hour Abuse-Deterrent 2 weeks at ran out Patient Yes No   Sig: Take 10 mg by mouth every 12 hours.   quetiapine (SEROQUEL) 100 MG Tab 2 weeks at ran out Patient Yes No   Sig: Take 100 mg by mouth 3 times a day.      Facility-Administered Medications: None       Physical Exam  Temp:  [37.3 °C (99.1 °F)] 37.3 °C (99.1 °F)  Pulse:  [] 96  Resp:  [20-22] 22  BP: (139)/(86) 139/86    Physical Exam   Constitutional: He is oriented to person, place, and time. He appears well-developed and well-nourished. No distress.   HENT:   Head: Normocephalic and atraumatic.   Mouth/Throat: Oropharynx is clear and moist.   Eyes: Pupils are equal, round, and reactive to light. Conjunctivae are normal. No scleral icterus.   Neck: Normal range of motion. Neck supple.   Cardiovascular: Normal rate, regular rhythm and normal heart sounds.    Pulmonary/Chest:  Effort normal and breath sounds normal. No respiratory distress. He has no wheezes. He has no rales.   Abdominal: Soft. Bowel sounds are normal. He exhibits no distension. There is tenderness (Epigastric and left upper quadrant). There is no rebound.   Musculoskeletal: Normal range of motion. He exhibits no edema or tenderness.   Lymphadenopathy:     He has no cervical adenopathy.   Neurological: He is alert and oriented to person, place, and time. No cranial nerve deficit. Coordination normal.   Skin: Skin is warm. No rash noted.   Psychiatric: He has a normal mood and affect. His behavior is normal.   Nursing note and vitals reviewed.      Laboratory:  Recent Labs      11/02/18   1745   WBC  17.9*   RBC  3.67*   HEMOGLOBIN  11.5*   HEMATOCRIT  32.8*   MCV  89.4   MCH  31.3   MCHC  35.1   RDW  42.8   PLATELETCT  293   MPV  8.9*     Recent Labs      11/02/18   1745   SODIUM  127*   POTASSIUM  4.6   CHLORIDE  94*   CO2  23   GLUCOSE  199*   BUN  33*   CREATININE  1.30   CALCIUM  9.0     Recent Labs      11/02/18   1745   ALTSGPT  25   ASTSGOT  28   ALKPHOSPHAT  84   TBILIRUBIN  0.6   LIPASE  121*   GLUCOSE  199*     Recent Labs      11/02/18   1745   APTT  30.5   INR  1.08     Recent Labs      11/02/18   1745   BNPBTYPENAT  73         Recent Labs      11/02/18   1745   TROPONINI  0.03       Urinalysis:    No results found     Imaging:  US-RUQ   Final Result         1.  Exam within normal limits      DX-CHEST-LIMITED (1 VIEW)   Final Result         No acute cardiac or pulmonary abnormality is identified.      EC-ECHOCARDIOGRAM COMPLETE W/O CONT    (Results Pending)         Assessment/Plan:  I anticipate this patient will require at least two midnights for appropriate medical management, necessitating inpatient admission.    Chest pain- (present on admission)   Assessment & Plan    Rule out ACS  Continuous cardiac monitoring with serial EKG and troponin  Nuclear medicine cardiac stress test in the morning if troponin  remains negative  Patient has been given full dose of aspirin  Check lipid panel, TSH and hemoglobin A1c  Nitro when necessary for chest pain  Check 2D echo          Acute pancreatitis- (present on admission)   Assessment & Plan    Patient reports epigastric abdominal pain  He denies drinking alcohol  Ultrasound abdomen negative for gallbladder pathology  Check triglycerides  IV fluid hydration with normal saline  Pain control with oxycodone        PTSD (post-traumatic stress disorder)- (present on admission)   Assessment & Plan    Continue Wellbutrin        Hypertension- (present on admission)   Assessment & Plan    Continue lisinopril        CAD (coronary artery disease)- (present on admission)   Assessment & Plan    Status post multiple stent placement  Continue aspirin, Plavix and Zocor        Leukocytosis- (present on admission)   Assessment & Plan    Likely reactive to pink otitis  Monitor CBC and vitals        Hyponatremia- (present on admission)   Assessment & Plan    Hypovolemic hyponatremia  IV fluid hydration with normal saline  Monitor BMP and assess response            VTE prophylaxis: heparin

## 2018-11-03 NOTE — PROGRESS NOTES
Beside report completed with Juanito ESTRADA. Pt A&Ox4, complaining of chest pain present on admission. Assessment complete, VSS, tele box in place. No further needs at this time. Bed in locked and low position, call light in reach.

## 2018-11-03 NOTE — PROGRESS NOTES
Report received from NATALIE bey. Pt transported with this RN. Oriented to unit. Tele box placed. Safety precautions in place. Zofran and tylenol given for pain and nausea.

## 2018-11-03 NOTE — CARE PLAN
Problem: Safety  Goal: Will remain free from falls  Outcome: PROGRESSING AS EXPECTED  Education provided. Safety precautions in place. Pt verbalized understanding.     Problem: Knowledge Deficit  Goal: Knowledge of the prescribed therapeutic regimen will improve  Outcome: PROGRESSING AS EXPECTED  Pt updated on plan of care. Pt verbalized understanding.

## 2018-11-03 NOTE — ED NOTES
"Pt resting quietly,  States pain \"alittle better\",  Call light in reach.  Denies needs at this time  "

## 2018-11-03 NOTE — CARE PLAN
Problem: Safety  Goal: Will remain free from falls  Outcome: PROGRESSING AS EXPECTED  Fall prevention education provided. Pt verbalized understanding, call light in reach, bed in locked and low position.    Problem: Pain Management  Goal: Pain level will decrease to patient's comfort goal  Outcome: PROGRESSING AS EXPECTED  Pt continues to complain of chest pain, relaxation techniques utilized and pt is resting comfortably in bed.

## 2018-11-03 NOTE — ED TRIAGE NOTES
"Rajiv Sam  Chief Complaint   Patient presents with   • Chest Pain     x 2 hours     Pt biba from home, sharp mid sternal chest pain that started 2 hours ago, while \"drinking orange juice\".  VSS.  +SOB, and slight nausea.   On monitor. In gown.    Pt received nitro SL x 1, 243 mg asa (took 81mg this am),  500mL NS, And 100mcg fent IV PTA with very little relief.        "

## 2018-11-03 NOTE — PROGRESS NOTES
2 RN skin check done with NATALIE Wilde:  Elbows pink and blanching. Healing scabs on bilateral shins. Healing blister on rt heel. Bilateral heels pink and blanching. No other skin issues.

## 2018-11-03 NOTE — ASSESSMENT & PLAN NOTE
Status post multiple stent placement  Continue aspirin, plavix and Zocor  11/4 started metoprolol 25 bid

## 2018-11-03 NOTE — ASSESSMENT & PLAN NOTE
Patient reports epigastric abdominal pain  He denies drinking alcohol  Ultrasound abdomen negative for gallbladder pathology  IV fluid hydration with normal saline  Pain control with oxycodone  11/4:  Imaging with CT abdomen/pelvis with contrast no acute pancreatitis.  Lipid panel wnl  Lipase mildly elevated at 126.  Advanced diet full liquid to GI soft.

## 2018-11-04 LAB
ALBUMIN SERPL BCP-MCNC: 3.2 G/DL (ref 3.2–4.9)
ALBUMIN/GLOB SERPL: 1.7 G/DL
ALP SERPL-CCNC: 75 U/L (ref 30–99)
ALT SERPL-CCNC: 22 U/L (ref 2–50)
ANION GAP SERPL CALC-SCNC: 4 MMOL/L (ref 0–11.9)
AST SERPL-CCNC: 14 U/L (ref 12–45)
BILIRUB SERPL-MCNC: 0.3 MG/DL (ref 0.1–1.5)
BUN SERPL-MCNC: 10 MG/DL (ref 8–22)
CALCIUM SERPL-MCNC: 8.5 MG/DL (ref 8.5–10.5)
CHLORIDE SERPL-SCNC: 104 MMOL/L (ref 96–112)
CHOLEST SERPL-MCNC: 107 MG/DL (ref 100–199)
CO2 SERPL-SCNC: 25 MMOL/L (ref 20–33)
CREAT SERPL-MCNC: 1.11 MG/DL (ref 0.5–1.4)
GLOBULIN SER CALC-MCNC: 1.9 G/DL (ref 1.9–3.5)
GLUCOSE BLD-MCNC: 151 MG/DL (ref 65–99)
GLUCOSE BLD-MCNC: 166 MG/DL (ref 65–99)
GLUCOSE BLD-MCNC: 167 MG/DL (ref 65–99)
GLUCOSE BLD-MCNC: 196 MG/DL (ref 65–99)
GLUCOSE SERPL-MCNC: 155 MG/DL (ref 65–99)
HDLC SERPL-MCNC: 43 MG/DL
LDLC SERPL CALC-MCNC: 46 MG/DL
LIPASE SERPL-CCNC: 125 U/L (ref 11–82)
LV EJECT FRACT  99904: 70
LV EJECT FRACT MOD 2C 99903: 64.1
LV EJECT FRACT MOD 4C 99902: 81.62
LV EJECT FRACT MOD BP 99901: 74.03
POTASSIUM SERPL-SCNC: 4.3 MMOL/L (ref 3.6–5.5)
PROT SERPL-MCNC: 5.1 G/DL (ref 6–8.2)
SODIUM SERPL-SCNC: 133 MMOL/L (ref 135–145)
TRIGL SERPL-MCNC: 90 MG/DL (ref 0–149)

## 2018-11-04 PROCEDURE — 82962 GLUCOSE BLOOD TEST: CPT

## 2018-11-04 PROCEDURE — 700102 HCHG RX REV CODE 250 W/ 637 OVERRIDE(OP): Performed by: HOSPITALIST

## 2018-11-04 PROCEDURE — 770020 HCHG ROOM/CARE - TELE (206)

## 2018-11-04 PROCEDURE — 99232 SBSQ HOSP IP/OBS MODERATE 35: CPT | Performed by: HOSPITALIST

## 2018-11-04 PROCEDURE — 80053 COMPREHEN METABOLIC PANEL: CPT

## 2018-11-04 PROCEDURE — 700105 HCHG RX REV CODE 258: Performed by: INTERNAL MEDICINE

## 2018-11-04 PROCEDURE — 36415 COLL VENOUS BLD VENIPUNCTURE: CPT

## 2018-11-04 PROCEDURE — A9270 NON-COVERED ITEM OR SERVICE: HCPCS | Performed by: INTERNAL MEDICINE

## 2018-11-04 PROCEDURE — A9270 NON-COVERED ITEM OR SERVICE: HCPCS | Performed by: HOSPITALIST

## 2018-11-04 PROCEDURE — 700111 HCHG RX REV CODE 636 W/ 250 OVERRIDE (IP): Performed by: INTERNAL MEDICINE

## 2018-11-04 PROCEDURE — 700102 HCHG RX REV CODE 250 W/ 637 OVERRIDE(OP): Performed by: INTERNAL MEDICINE

## 2018-11-04 PROCEDURE — 83690 ASSAY OF LIPASE: CPT

## 2018-11-04 PROCEDURE — 80061 LIPID PANEL: CPT

## 2018-11-04 RX ADMIN — HYDROCODONE BITARTRATE AND ACETAMINOPHEN 1 TABLET: 5; 325 TABLET ORAL at 14:36

## 2018-11-04 RX ADMIN — INSULIN HUMAN 1 UNITS: 100 INJECTION, SOLUTION PARENTERAL at 18:37

## 2018-11-04 RX ADMIN — HYDROCODONE BITARTRATE AND ACETAMINOPHEN 1 TABLET: 5; 325 TABLET ORAL at 01:30

## 2018-11-04 RX ADMIN — SODIUM CHLORIDE: 9 INJECTION, SOLUTION INTRAVENOUS at 08:31

## 2018-11-04 RX ADMIN — QUETIAPINE FUMARATE 100 MG: 25 TABLET ORAL at 18:27

## 2018-11-04 RX ADMIN — OXYCODONE HYDROCHLORIDE 10 MG: 10 TABLET, FILM COATED, EXTENDED RELEASE ORAL at 05:04

## 2018-11-04 RX ADMIN — BUPROPION HYDROCHLORIDE 150 MG: 150 TABLET, EXTENDED RELEASE ORAL at 05:03

## 2018-11-04 RX ADMIN — INSULIN HUMAN 1 UNITS: 100 INJECTION, SOLUTION PARENTERAL at 20:20

## 2018-11-04 RX ADMIN — QUETIAPINE FUMARATE 100 MG: 25 TABLET ORAL at 05:04

## 2018-11-04 RX ADMIN — QUETIAPINE FUMARATE 100 MG: 25 TABLET ORAL at 12:52

## 2018-11-04 RX ADMIN — HYDROCODONE BITARTRATE AND ACETAMINOPHEN 1 TABLET: 5; 325 TABLET ORAL at 08:31

## 2018-11-04 RX ADMIN — HEPARIN SODIUM 5000 UNITS: 5000 INJECTION, SOLUTION INTRAVENOUS; SUBCUTANEOUS at 20:24

## 2018-11-04 RX ADMIN — CLOPIDOGREL 75 MG: 75 TABLET, FILM COATED ORAL at 05:04

## 2018-11-04 RX ADMIN — BUPROPION HYDROCHLORIDE 150 MG: 150 TABLET, EXTENDED RELEASE ORAL at 18:27

## 2018-11-04 RX ADMIN — HEPARIN SODIUM 5000 UNITS: 5000 INJECTION, SOLUTION INTRAVENOUS; SUBCUTANEOUS at 05:03

## 2018-11-04 RX ADMIN — OXYCODONE HYDROCHLORIDE 10 MG: 10 TABLET, FILM COATED, EXTENDED RELEASE ORAL at 18:26

## 2018-11-04 RX ADMIN — HEPARIN SODIUM 5000 UNITS: 5000 INJECTION, SOLUTION INTRAVENOUS; SUBCUTANEOUS at 14:29

## 2018-11-04 RX ADMIN — ASPIRIN 81 MG: 81 TABLET, COATED ORAL at 05:04

## 2018-11-04 RX ADMIN — SIMVASTATIN 20 MG: 20 TABLET, FILM COATED ORAL at 18:27

## 2018-11-04 RX ADMIN — METOPROLOL TARTRATE 25 MG: 25 TABLET, FILM COATED ORAL at 18:27

## 2018-11-04 RX ADMIN — METOPROLOL TARTRATE 25 MG: 25 TABLET, FILM COATED ORAL at 12:52

## 2018-11-04 RX ADMIN — LISINOPRIL 20 MG: 20 TABLET ORAL at 05:04

## 2018-11-04 RX ADMIN — STANDARDIZED SENNA CONCENTRATE AND DOCUSATE SODIUM 2 TABLET: 8.6; 5 TABLET, FILM COATED ORAL at 05:04

## 2018-11-04 ASSESSMENT — ENCOUNTER SYMPTOMS
SORE THROAT: 0
SPEECH CHANGE: 0
BACK PAIN: 0
DIZZINESS: 0
COUGH: 0
MYALGIAS: 0
HEMOPTYSIS: 0
FOCAL WEAKNESS: 0
DIAPHORESIS: 0
BRUISES/BLEEDS EASILY: 0
EYE PAIN: 0
NAUSEA: 0
SPUTUM PRODUCTION: 0
CHILLS: 0
CONSTIPATION: 0
EYE DISCHARGE: 0
DEPRESSION: 0
LOSS OF CONSCIOUSNESS: 0
FEVER: 0
HEADACHES: 0
SHORTNESS OF BREATH: 0
ABDOMINAL PAIN: 1
NECK PAIN: 0
WEAKNESS: 0
PALPITATIONS: 0
DIARRHEA: 0
WHEEZING: 0
VOMITING: 0
SENSORY CHANGE: 0
CLAUDICATION: 0

## 2018-11-04 ASSESSMENT — PAIN SCALES - GENERAL
PAINLEVEL_OUTOF10: 3
PAINLEVEL_OUTOF10: 6
PAINLEVEL_OUTOF10: 4
PAINLEVEL_OUTOF10: 6
PAINLEVEL_OUTOF10: 6

## 2018-11-04 ASSESSMENT — PATIENT HEALTH QUESTIONNAIRE - PHQ9
SUM OF ALL RESPONSES TO PHQ9 QUESTIONS 1 AND 2: 0
1. LITTLE INTEREST OR PLEASURE IN DOING THINGS: NOT AT ALL
2. FEELING DOWN, DEPRESSED, IRRITABLE, OR HOPELESS: NOT AT ALL

## 2018-11-04 ASSESSMENT — LIFESTYLE VARIABLES: SUBSTANCE_ABUSE: 0

## 2018-11-04 NOTE — PROGRESS NOTES
Received report from night shift RNHaylie. Discussed plan of care, updated white board. Pt sleeping comfortably in bed, respirations even and unlabored. No distress noted at this time. All needs met. Bed in lowest position. Call light within reach. Will continue to monitor.

## 2018-11-04 NOTE — CARE PLAN
Problem: Communication  Goal: The ability to communicate needs accurately and effectively will improve  Outcome: PROGRESSING AS EXPECTED  Discussed plan of care, reviewed meds with pt, encouraged pt to voice any questions and/or concerns regarding care.       Problem: Safety  Goal: Will remain free from falls  Outcome: PROGRESSING AS EXPECTED  Assessed fall risk, fall precautions initiated. Educated patient on use of call light, no slip socks on, bed lowest position. All needs attended to. Patient verbalized understanding. Pt calls appropriately.      Problem: Pain Management  Goal: Pain level will decrease to patient's comfort goal  Outcome: PROGRESSING AS EXPECTED  Patient educated to pain scale system. Patient encouraged to verbalize discomfort. Patient taught about non-pharmacological pain management. Patient is comfortable at this time without statements of discomfort or pain.      Problem: Respiratory:  Goal: Respiratory status will improve  Outcome: PROGRESSING AS EXPECTED

## 2018-11-04 NOTE — PROGRESS NOTES
[facility] Medicine Daily Progress Note    Date of Service  11/3/2018    Chief Complaint  57 y.o. male admitted 11/2/2018 with epigastric, lower chest pain.    Hospital Course   This 56 yo male with CAD s/p stents x3, current smoker presents with epigastric lower chest pain, found to have elevated lipase on admission, no nausea or emesis.  11/3:  Repeat lipase increased, negative NM stress test.  Pain in epigastrium straight throiugh to back, no h/o alcohol use.  Ordered CT abdomen for imaging pancreas.  states no nausea, ordered full liquid diet.  Check lipid panel in a.m. Repeat lipase.*     Interval Problem Update      Consultants/Specialty  none    Code Status  full    Disposition  Home when medically cleared.    Review of Systems  Review of Systems   Constitutional: Negative for chills, diaphoresis, fever and malaise/fatigue.   HENT: Negative for congestion and sore throat.    Eyes: Negative for pain and discharge.   Respiratory: Negative for cough, hemoptysis, sputum production, shortness of breath and wheezing.    Cardiovascular: Positive for chest pain. Negative for palpitations, claudication and leg swelling.   Gastrointestinal: Positive for abdominal pain. Negative for constipation, diarrhea, melena, nausea and vomiting.   Genitourinary: Negative for dysuria, frequency and urgency.   Musculoskeletal: Negative for back pain, joint pain, myalgias and neck pain.   Skin: Negative for itching and rash.   Neurological: Negative for dizziness, sensory change, speech change, focal weakness, loss of consciousness, weakness and headaches.   Endo/Heme/Allergies: Does not bruise/bleed easily.   Psychiatric/Behavioral: Negative for depression, substance abuse and suicidal ideas.        Physical Exam  Temp:  [36.2 °C (97.1 °F)-36.8 °C (98.2 °F)] 36.2 °C (97.2 °F)  Pulse:  [83-96] 96  Resp:  [16-25] 16  BP: ()/(61-91) 116/63    Physical Exam   Constitutional: He is oriented to person, place, and time. He appears  well-developed and well-nourished. No distress.   HENT:   Head: Normocephalic and atraumatic.   Mouth/Throat: Oropharynx is clear and moist. No oropharyngeal exudate.   Eyes: Pupils are equal, round, and reactive to light. Conjunctivae and EOM are normal. Right eye exhibits no discharge. Left eye exhibits no discharge. No scleral icterus.   Neck: Normal range of motion. Neck supple. No JVD present. No tracheal deviation present. No thyromegaly present.   Cardiovascular: Normal rate, regular rhythm and normal heart sounds.  Exam reveals no gallop and no friction rub.    No murmur heard.  Pulmonary/Chest: Effort normal and breath sounds normal. No respiratory distress. He has no wheezes. He has no rales. He exhibits no tenderness.   Abdominal: Soft. Bowel sounds are normal. He exhibits no distension and no mass. There is tenderness (epigastric pain with palpation). There is no rebound and no guarding.   Musculoskeletal: Normal range of motion. He exhibits no edema or tenderness.   Lymphadenopathy:     He has no cervical adenopathy.   Neurological: He is alert and oriented to person, place, and time. No cranial nerve deficit. He exhibits normal muscle tone.   Skin: Skin is warm and dry. No rash noted. He is not diaphoretic. No erythema.   Psychiatric: He has a normal mood and affect. His behavior is normal. Judgment and thought content normal.   Nursing note and vitals reviewed.      Fluids    Intake/Output Summary (Last 24 hours) at 11/03/18 2249  Last data filed at 11/03/18 0800   Gross per 24 hour   Intake              700 ml   Output              500 ml   Net              200 ml       Laboratory  Recent Labs      11/02/18   1745  11/03/18   1135   WBC  17.9*  10.3   RBC  3.67*  3.57*   HEMOGLOBIN  11.5*  11.0*   HEMATOCRIT  32.8*  32.9*   MCV  89.4  92.2   MCH  31.3  30.8   MCHC  35.1  33.4*   RDW  42.8  45.7   PLATELETCT  293  273   MPV  8.9*  8.8*     Recent Labs      11/02/18   1745  11/03/18   1135   SODIUM   127*  131*   POTASSIUM  4.6  4.0   CHLORIDE  94*  101   CO2  23  26   GLUCOSE  199*  147*   BUN  33*  17   CREATININE  1.30  1.10   CALCIUM  9.0  8.1*     Recent Labs      11/02/18   1745   APTT  30.5   INR  1.08     Recent Labs      11/02/18   1745   BNPBTYPENAT  73           Imaging  EC-ECHOCARDIOGRAM COMPLETE W/O CONT         CT-ABDOMEN-PELVIS WITH   Final Result      1.  No acute abnormalities are noted on abdominal CT.  No acute abnormalities are identified on pelvic CT.   Calcified granulomas are noted in the spleen.      2.  Trace right pleural effusion and minimal opacification noted in the right lung base.         NM-CARDIAC STRESS TEST   Final Result      US-RUQ   Final Result         1.  Exam within normal limits      DX-CHEST-LIMITED (1 VIEW)   Final Result         No acute cardiac or pulmonary abnormality is identified.           Assessment/Plan  Chest pain- (present on admission)   Assessment & Plan    Rule out ACS  Continuous cardiac monitoring with serial EKG and troponin  Nuclear medicine cardiac stress test negative  Patient has been given full dose of aspirin  TSH and hemoglobin A1c  Nitro when necessary for chest pain  Check 2D echo          Acute pancreatitis- (present on admission)   Assessment & Plan    Patient reports epigastric abdominal pain  He denies drinking alcohol  Ultrasound abdomen negative for gallbladder pathology  IV fluid hydration with normal saline  Pain control with oxycodone  Advance diet to full liquids.  Imaging with CT abdomen/pelvis with contrast  Lipid panel ordered.        PTSD (post-traumatic stress disorder)- (present on admission)   Assessment & Plan    Continue Wellbutrin        Hypertension- (present on admission)   Assessment & Plan    Continue lisinopril        CAD (coronary artery disease)- (present on admission)   Assessment & Plan    Status post multiple stent placement  Continue aspirin, Plavix and Zocor        Leukocytosis- (present on admission)    Assessment & Plan    Likely reactive  Monitor CBC and vitals        Hyponatremia- (present on admission)   Assessment & Plan    Hypovolemic hyponatremia  IV fluid hydration with normal saline  Monitor BMP and assess response             VTE prophylaxis: heparin

## 2018-11-05 ENCOUNTER — PATIENT OUTREACH (OUTPATIENT)
Dept: HEALTH INFORMATION MANAGEMENT | Facility: OTHER | Age: 58
End: 2018-11-05

## 2018-11-05 PROBLEM — R07.9 CHEST PAIN: Status: RESOLVED | Noted: 2018-11-03 | Resolved: 2018-11-05

## 2018-11-05 PROBLEM — R45.851 SUICIDAL IDEATION: Status: ACTIVE | Noted: 2018-11-05

## 2018-11-05 PROBLEM — Z59.00 HOMELESSNESS: Status: ACTIVE | Noted: 2018-11-05

## 2018-11-05 PROBLEM — Z95.5 H/O HEART ARTERY STENT: Status: ACTIVE | Noted: 2018-11-05

## 2018-11-05 PROBLEM — D72.829 LEUKOCYTOSIS: Status: RESOLVED | Noted: 2018-11-03 | Resolved: 2018-11-05

## 2018-11-05 PROBLEM — F11.90 CHRONIC NARCOTIC USE: Status: ACTIVE | Noted: 2018-11-05

## 2018-11-05 PROBLEM — E87.1 HYPONATREMIA: Status: RESOLVED | Noted: 2018-11-03 | Resolved: 2018-11-05

## 2018-11-05 LAB
ALBUMIN SERPL BCP-MCNC: 3.5 G/DL (ref 3.2–4.9)
ALBUMIN/GLOB SERPL: 1.6 G/DL
ALP SERPL-CCNC: 81 U/L (ref 30–99)
ALT SERPL-CCNC: 22 U/L (ref 2–50)
ANION GAP SERPL CALC-SCNC: 4 MMOL/L (ref 0–11.9)
AST SERPL-CCNC: 14 U/L (ref 12–45)
BASOPHILS # BLD AUTO: 0.5 % (ref 0–1.8)
BASOPHILS # BLD: 0.05 K/UL (ref 0–0.12)
BILIRUB SERPL-MCNC: 0.3 MG/DL (ref 0.1–1.5)
BUN SERPL-MCNC: 11 MG/DL (ref 8–22)
CALCIUM SERPL-MCNC: 9.2 MG/DL (ref 8.5–10.5)
CHLORIDE SERPL-SCNC: 102 MMOL/L (ref 96–112)
CO2 SERPL-SCNC: 27 MMOL/L (ref 20–33)
CREAT SERPL-MCNC: 1.31 MG/DL (ref 0.5–1.4)
EOSINOPHIL # BLD AUTO: 0.52 K/UL (ref 0–0.51)
EOSINOPHIL NFR BLD: 5.6 % (ref 0–6.9)
ERYTHROCYTE [DISTWIDTH] IN BLOOD BY AUTOMATED COUNT: 45.6 FL (ref 35.9–50)
GLOBULIN SER CALC-MCNC: 2.2 G/DL (ref 1.9–3.5)
GLUCOSE BLD-MCNC: 128 MG/DL (ref 65–99)
GLUCOSE BLD-MCNC: 167 MG/DL (ref 65–99)
GLUCOSE BLD-MCNC: 271 MG/DL (ref 65–99)
GLUCOSE BLD-MCNC: 326 MG/DL (ref 65–99)
GLUCOSE SERPL-MCNC: 198 MG/DL (ref 65–99)
HCT VFR BLD AUTO: 33.5 % (ref 42–52)
HGB BLD-MCNC: 11.3 G/DL (ref 14–18)
IMM GRANULOCYTES # BLD AUTO: 0.07 K/UL (ref 0–0.11)
IMM GRANULOCYTES NFR BLD AUTO: 0.8 % (ref 0–0.9)
LIPASE SERPL-CCNC: 166 U/L (ref 11–82)
LYMPHOCYTES # BLD AUTO: 1.23 K/UL (ref 1–4.8)
LYMPHOCYTES NFR BLD: 13.3 % (ref 22–41)
MCH RBC QN AUTO: 31.5 PG (ref 27–33)
MCHC RBC AUTO-ENTMCNC: 33.7 G/DL (ref 33.7–35.3)
MCV RBC AUTO: 93.3 FL (ref 81.4–97.8)
MONOCYTES # BLD AUTO: 1.19 K/UL (ref 0–0.85)
MONOCYTES NFR BLD AUTO: 12.8 % (ref 0–13.4)
NEUTROPHILS # BLD AUTO: 6.22 K/UL (ref 1.82–7.42)
NEUTROPHILS NFR BLD: 67 % (ref 44–72)
NRBC # BLD AUTO: 0 K/UL
NRBC BLD-RTO: 0 /100 WBC
PLATELET # BLD AUTO: 277 K/UL (ref 164–446)
PMV BLD AUTO: 9.2 FL (ref 9–12.9)
POTASSIUM SERPL-SCNC: 4.6 MMOL/L (ref 3.6–5.5)
PROT SERPL-MCNC: 5.7 G/DL (ref 6–8.2)
RBC # BLD AUTO: 3.59 M/UL (ref 4.7–6.1)
SODIUM SERPL-SCNC: 133 MMOL/L (ref 135–145)
WBC # BLD AUTO: 9.3 K/UL (ref 4.8–10.8)

## 2018-11-05 PROCEDURE — 99233 SBSQ HOSP IP/OBS HIGH 50: CPT | Performed by: HOSPITALIST

## 2018-11-05 PROCEDURE — 770001 HCHG ROOM/CARE - MED/SURG/GYN PRIV*

## 2018-11-05 PROCEDURE — 36415 COLL VENOUS BLD VENIPUNCTURE: CPT

## 2018-11-05 PROCEDURE — 99222 1ST HOSP IP/OBS MODERATE 55: CPT | Performed by: PSYCHIATRY & NEUROLOGY

## 2018-11-05 PROCEDURE — 85025 COMPLETE CBC W/AUTO DIFF WBC: CPT

## 2018-11-05 PROCEDURE — A9270 NON-COVERED ITEM OR SERVICE: HCPCS | Performed by: INTERNAL MEDICINE

## 2018-11-05 PROCEDURE — 83690 ASSAY OF LIPASE: CPT

## 2018-11-05 PROCEDURE — 700102 HCHG RX REV CODE 250 W/ 637 OVERRIDE(OP): Performed by: INTERNAL MEDICINE

## 2018-11-05 PROCEDURE — 700111 HCHG RX REV CODE 636 W/ 250 OVERRIDE (IP): Performed by: INTERNAL MEDICINE

## 2018-11-05 PROCEDURE — 700102 HCHG RX REV CODE 250 W/ 637 OVERRIDE(OP): Performed by: STUDENT IN AN ORGANIZED HEALTH CARE EDUCATION/TRAINING PROGRAM

## 2018-11-05 PROCEDURE — 82962 GLUCOSE BLOOD TEST: CPT | Mod: 91

## 2018-11-05 PROCEDURE — A9270 NON-COVERED ITEM OR SERVICE: HCPCS | Performed by: STUDENT IN AN ORGANIZED HEALTH CARE EDUCATION/TRAINING PROGRAM

## 2018-11-05 PROCEDURE — A9270 NON-COVERED ITEM OR SERVICE: HCPCS | Performed by: HOSPITALIST

## 2018-11-05 PROCEDURE — 700102 HCHG RX REV CODE 250 W/ 637 OVERRIDE(OP): Performed by: HOSPITALIST

## 2018-11-05 PROCEDURE — 80053 COMPREHEN METABOLIC PANEL: CPT

## 2018-11-05 RX ORDER — LIDOCAINE 50 MG/G
1 PATCH TOPICAL EVERY 24 HOURS
Qty: 10 PATCH | Refills: 3 | Status: SHIPPED | OUTPATIENT
Start: 2018-11-05 | End: 2018-11-08 | Stop reason: CLARIF

## 2018-11-05 RX ORDER — QUETIAPINE FUMARATE 25 MG/1
200 TABLET, FILM COATED ORAL 2 TIMES DAILY
Status: DISCONTINUED | OUTPATIENT
Start: 2018-11-05 | End: 2018-11-06 | Stop reason: HOSPADM

## 2018-11-05 RX ORDER — GABAPENTIN 300 MG/1
600 CAPSULE ORAL 2 TIMES DAILY
Qty: 60 CAP | Refills: 3 | Status: SHIPPED | OUTPATIENT
Start: 2018-11-05 | End: 2018-11-05

## 2018-11-05 RX ORDER — GABAPENTIN 300 MG/1
600 CAPSULE ORAL 2 TIMES DAILY
Qty: 60 CAP | Refills: 3 | Status: SHIPPED | OUTPATIENT
Start: 2018-11-05 | End: 2018-11-06

## 2018-11-05 RX ORDER — GABAPENTIN 300 MG/1
300 CAPSULE ORAL 3 TIMES DAILY
Status: DISCONTINUED | OUTPATIENT
Start: 2018-11-05 | End: 2018-11-06 | Stop reason: HOSPADM

## 2018-11-05 RX ORDER — METOPROLOL TARTRATE 50 MG/1
50 TABLET, FILM COATED ORAL 2 TIMES DAILY
Qty: 60 TAB | Refills: 3 | Status: SHIPPED | OUTPATIENT
Start: 2018-11-05 | End: 2018-11-08 | Stop reason: CLARIF

## 2018-11-05 RX ORDER — SIMVASTATIN 20 MG
20 TABLET ORAL EVERY EVENING
Qty: 30 TAB | Refills: 3 | Status: ON HOLD | OUTPATIENT
Start: 2018-11-05 | End: 2018-12-28 | Stop reason: CLARIF

## 2018-11-05 RX ORDER — HYDROCODONE BITARTRATE AND ACETAMINOPHEN 5; 325 MG/1; MG/1
1 TABLET ORAL ONCE
Status: DISCONTINUED | OUTPATIENT
Start: 2018-11-05 | End: 2018-11-06 | Stop reason: HOSPADM

## 2018-11-05 RX ADMIN — INSULIN HUMAN 1 UNITS: 100 INJECTION, SOLUTION PARENTERAL at 17:55

## 2018-11-05 RX ADMIN — SIMVASTATIN 20 MG: 20 TABLET, FILM COATED ORAL at 17:58

## 2018-11-05 RX ADMIN — LISINOPRIL 20 MG: 20 TABLET ORAL at 05:47

## 2018-11-05 RX ADMIN — ACETAMINOPHEN 650 MG: 325 TABLET, FILM COATED ORAL at 21:00

## 2018-11-05 RX ADMIN — QUETIAPINE FUMARATE 100 MG: 25 TABLET ORAL at 05:46

## 2018-11-05 RX ADMIN — GABAPENTIN 300 MG: 300 CAPSULE ORAL at 17:59

## 2018-11-05 RX ADMIN — BUPROPION HYDROCHLORIDE 150 MG: 150 TABLET, EXTENDED RELEASE ORAL at 05:47

## 2018-11-05 RX ADMIN — HEPARIN SODIUM 5000 UNITS: 5000 INJECTION, SOLUTION INTRAVENOUS; SUBCUTANEOUS at 21:00

## 2018-11-05 RX ADMIN — CLOPIDOGREL 75 MG: 75 TABLET, FILM COATED ORAL at 05:47

## 2018-11-05 RX ADMIN — OXYCODONE HYDROCHLORIDE 10 MG: 10 TABLET, FILM COATED, EXTENDED RELEASE ORAL at 05:46

## 2018-11-05 RX ADMIN — HEPARIN SODIUM 5000 UNITS: 5000 INJECTION, SOLUTION INTRAVENOUS; SUBCUTANEOUS at 13:27

## 2018-11-05 RX ADMIN — QUETIAPINE FUMARATE 200 MG: 25 TABLET ORAL at 17:59

## 2018-11-05 RX ADMIN — HEPARIN SODIUM 5000 UNITS: 5000 INJECTION, SOLUTION INTRAVENOUS; SUBCUTANEOUS at 05:46

## 2018-11-05 RX ADMIN — BUPROPION HYDROCHLORIDE 150 MG: 150 TABLET, EXTENDED RELEASE ORAL at 17:59

## 2018-11-05 RX ADMIN — METOPROLOL TARTRATE 25 MG: 25 TABLET, FILM COATED ORAL at 17:58

## 2018-11-05 RX ADMIN — HYDROCODONE BITARTRATE AND ACETAMINOPHEN 1 TABLET: 5; 325 TABLET ORAL at 10:06

## 2018-11-05 RX ADMIN — QUETIAPINE FUMARATE 100 MG: 25 TABLET ORAL at 12:00

## 2018-11-05 RX ADMIN — INSULIN HUMAN 4 UNITS: 100 INJECTION, SOLUTION PARENTERAL at 21:05

## 2018-11-05 RX ADMIN — INSULIN HUMAN 3 UNITS: 100 INJECTION, SOLUTION PARENTERAL at 09:05

## 2018-11-05 RX ADMIN — ASPIRIN 81 MG: 81 TABLET, COATED ORAL at 05:47

## 2018-11-05 RX ADMIN — METOPROLOL TARTRATE 25 MG: 25 TABLET, FILM COATED ORAL at 05:46

## 2018-11-05 ASSESSMENT — ENCOUNTER SYMPTOMS
SPUTUM PRODUCTION: 0
DIAPHORESIS: 0
SHORTNESS OF BREATH: 0
LOSS OF CONSCIOUSNESS: 0
PALPITATIONS: 0
DIARRHEA: 0
SORE THROAT: 0
FOCAL WEAKNESS: 0
HEADACHES: 0
WEAKNESS: 0
DIZZINESS: 0
NAUSEA: 0
CLAUDICATION: 0
EYE PAIN: 0
ABDOMINAL PAIN: 1
FEVER: 0
BRUISES/BLEEDS EASILY: 0
CHILLS: 0
BACK PAIN: 0
COUGH: 0
EYE DISCHARGE: 0
HEMOPTYSIS: 0
SPEECH CHANGE: 0
MYALGIAS: 0
NECK PAIN: 0
VOMITING: 0
SENSORY CHANGE: 0
CONSTIPATION: 0
WHEEZING: 0
DEPRESSION: 0

## 2018-11-05 ASSESSMENT — PAIN SCALES - GENERAL
PAINLEVEL_OUTOF10: 2
PAINLEVEL_OUTOF10: 5
PAINLEVEL_OUTOF10: 6
PAINLEVEL_OUTOF10: 9
PAINLEVEL_OUTOF10: 0

## 2018-11-05 ASSESSMENT — LIFESTYLE VARIABLES: SUBSTANCE_ABUSE: 0

## 2018-11-05 ASSESSMENT — PATIENT HEALTH QUESTIONNAIRE - PHQ9
1. LITTLE INTEREST OR PLEASURE IN DOING THINGS: NOT AT ALL
SUM OF ALL RESPONSES TO PHQ9 QUESTIONS 1 AND 2: 0
2. FEELING DOWN, DEPRESSED, IRRITABLE, OR HOPELESS: NOT AT ALL

## 2018-11-05 NOTE — PSYCHIATRY
.BRIEF PSYCHIATRIC CONSULT NOTE: patient seen, full note to follow.  -Legal Hold:extended  -Sitter:yes  -Restrictions:   -phone:no   -visitors:no   -personal items: no   -finger foods required:    -personal clothes:   -Orders Placed: routine  -Plan:continue to follow

## 2018-11-05 NOTE — PROGRESS NOTES
Pt states he has SI with organized plan.     Charge RN & and Dr. Ibarra notified.     1:1 observation initiated with sitter.     Legal Hold paperwork initiated, Psych to be consulted. Will continue to monitor.

## 2018-11-05 NOTE — PROGRESS NOTES
[facility] Medicine Daily Progress Note    Date of Service  11/4/2018    Chief Complaint  57 y.o. male admitted 11/2/2018 with epigastric, lower chest pain.    Hospital Course   This 56 yo male with CAD s/p stents x3, current smoker presents with epigastric lower chest pain, found to have elevated lipase on admission, no nausea or emesis.  11/3:  Repeat lipase increased, negative NM stress test.  Pain in epigastrium straight throiugh to back, no h/o alcohol use.  Ordered CT abdomen for imaging pancreas.  states no nausea, ordered full liquid diet.  Check lipid panel in a.m. Repeat lipase.  11/4:  Repeat lipase 126.  Tolerating increase in diet to GI soft.  Still c/o epigastric pain.  Not ready for dc per patient.  CT abdomen/pelvis negative.  started metoprolol 25 bid for SR  and h/o CAD with stents.*     Interval Problem Update      Consultants/Specialty  none    Code Status  full    Disposition  Home when medically cleared.    Review of Systems  Review of Systems   Constitutional: Negative for chills, diaphoresis, fever and malaise/fatigue.   HENT: Negative for congestion and sore throat.    Eyes: Negative for pain and discharge.   Respiratory: Negative for cough, hemoptysis, sputum production, shortness of breath and wheezing.    Cardiovascular: Positive for chest pain. Negative for palpitations, claudication and leg swelling.   Gastrointestinal: Positive for abdominal pain. Negative for constipation, diarrhea, melena, nausea and vomiting.   Genitourinary: Negative for dysuria, frequency and urgency.   Musculoskeletal: Negative for back pain, joint pain, myalgias and neck pain.   Skin: Negative for itching and rash.   Neurological: Negative for dizziness, sensory change, speech change, focal weakness, loss of consciousness, weakness and headaches.   Endo/Heme/Allergies: Does not bruise/bleed easily.   Psychiatric/Behavioral: Negative for depression, substance abuse and suicidal ideas.        Physical  Exam  Temp:  [36.2 °C (97.2 °F)-37.1 °C (98.8 °F)] 36.3 °C (97.4 °F)  Pulse:  [75-96] 85  Resp:  [16-17] 16  BP: (106-121)/(63-82) 119/78    Physical Exam   Constitutional: He is oriented to person, place, and time. He appears well-developed and well-nourished. No distress.   HENT:   Head: Normocephalic and atraumatic.   Mouth/Throat: Oropharynx is clear and moist. No oropharyngeal exudate.   Eyes: Pupils are equal, round, and reactive to light. Conjunctivae and EOM are normal. Right eye exhibits no discharge. Left eye exhibits no discharge. No scleral icterus.   Neck: Normal range of motion. Neck supple. No JVD present. No tracheal deviation present. No thyromegaly present.   Cardiovascular: Normal rate, regular rhythm and normal heart sounds.  Exam reveals no gallop and no friction rub.    No murmur heard.  Pulmonary/Chest: Effort normal and breath sounds normal. No respiratory distress. He has no wheezes. He has no rales. He exhibits no tenderness.   Abdominal: Soft. Bowel sounds are normal. He exhibits no distension and no mass. There is tenderness (epigastric pain with palpation). There is no rebound and no guarding.   Musculoskeletal: Normal range of motion. He exhibits no edema or tenderness.   Lymphadenopathy:     He has no cervical adenopathy.   Neurological: He is alert and oriented to person, place, and time. No cranial nerve deficit. He exhibits normal muscle tone.   Skin: Skin is warm and dry. No rash noted. He is not diaphoretic. No erythema.   Psychiatric: He has a normal mood and affect. His behavior is normal. Judgment and thought content normal.   Nursing note and vitals reviewed.      Fluids    Intake/Output Summary (Last 24 hours) at 11/04/18 1903  Last data filed at 11/04/18 0600   Gross per 24 hour   Intake             1200 ml   Output                0 ml   Net             1200 ml       Laboratory  Recent Labs      11/02/18   1745  11/03/18   1135   WBC  17.9*  10.3   RBC  3.67*  3.57*    HEMOGLOBIN  11.5*  11.0*   HEMATOCRIT  32.8*  32.9*   MCV  89.4  92.2   MCH  31.3  30.8   MCHC  35.1  33.4*   RDW  42.8  45.7   PLATELETCT  293  273   MPV  8.9*  8.8*     Recent Labs      11/02/18   1745  11/03/18   1135  11/04/18   1148   SODIUM  127*  131*  133*   POTASSIUM  4.6  4.0  4.3   CHLORIDE  94*  101  104   CO2  23  26  25   GLUCOSE  199*  147*  155*   BUN  33*  17  10   CREATININE  1.30  1.10  1.11   CALCIUM  9.0  8.1*  8.5     Recent Labs      11/02/18   1745   APTT  30.5   INR  1.08     Recent Labs      11/02/18   1745   BNPBTYPENAT  73     Recent Labs      11/04/18   0131   TRIGLYCERIDE  90   HDL  43   LDL  46       Imaging  EC-ECHOCARDIOGRAM COMPLETE W/O CONT   Final Result      CT-ABDOMEN-PELVIS WITH   Final Result      1.  No acute abnormalities are noted on abdominal CT.  No acute abnormalities are identified on pelvic CT.   Calcified granulomas are noted in the spleen.      2.  Trace right pleural effusion and minimal opacification noted in the right lung base.         NM-CARDIAC STRESS TEST   Final Result      US-RUQ   Final Result         1.  Exam within normal limits      DX-CHEST-LIMITED (1 VIEW)   Final Result         No acute cardiac or pulmonary abnormality is identified.           Assessment/Plan  Chest pain- (present on admission)   Assessment & Plan    Rule out ACS  Continuous cardiac monitoring with serial EKG and troponin  Nuclear medicine cardiac stress test negative  Patient has been given full dose of aspirin  TSH and hemoglobin A1c  Nitro when necessary for chest pain  Check 2D echo       Acute pancreatitis- (present on admission)   Assessment & Plan    Patient reports epigastric abdominal pain  He denies drinking alcohol  Ultrasound abdomen negative for gallbladder pathology  IV fluid hydration with normal saline  Pain control with oxycodone  11/4:  Imaging with CT abdomen/pelvis with contrast no acute pancreatitis.  Lipid panel wnl  Lipase mildly elevated at 126.  Advanced  diet full liquid to GI soft.       PTSD (post-traumatic stress disorder)- (present on admission)   Assessment & Plan    Continue Wellbutrin     Hypertension- (present on admission)   Assessment & Plan    Continue lisinopril     CAD (coronary artery disease)- (present on admission)   Assessment & Plan    Status post multiple stent placement  Continue aspirin, Plavix and Zocor  11/4 started metoprolol 25 bid     Leukocytosis- (present on admission)   Assessment & Plan    Likely reactive  Monitor CBC and vitals     Hyponatremia- (present on admission)   Assessment & Plan    Hypovolemic hyponatremia  IV fluid hydration with normal saline  Monitor BMP and assess response          VTE prophylaxis: heparin

## 2018-11-05 NOTE — PROGRESS NOTES
Pt says he's feeling depressed, he was DCed from psych hospital 1 week ago and that he needs his psych meds. Pt denies suicidal ideation at this time. Will continue to monitor and pass on to oncoming RN

## 2018-11-05 NOTE — DISCHARGE SUMMARY
Discharge Summary    CHIEF COMPLAINT ON ADMISSION  Chief Complaint   Patient presents with   • Chest Pain     x 2 hours       Reason for Admission  EMS 05     Admission Date  11/2/2018    CODE STATUS  Full Code    HPI & HOSPITAL COURSE     This 58 yo male with CAD s/p stents x3, current smoker presents with epigastric lower chest pain, found to have elevated lipase on admission, no nausea or emesis.  11/3:  Repeat lipase increased, negative NM stress test.  Pain in epigastrium straight throiugh to back, no h/o alcohol use.  Ordered CT abdomen for imaging pancreas.  states no nausea, ordered full liquid diet.  Check lipid panel in a.m. Repeat lipase.  11/4:  Repeat lipase 126.  Tolerating increase in diet to GI soft.  Still c/o epigastric pain.  Not ready for dc per patient.  CT abdomen/pelvis negative.  started metoprolol 25 bid for SR  and h/o CAD with stents.*     The patient requested a regular diet and was tolerating it despite complaining of epigastric pain.  Of note, this is the pain he was complaining of on admission, thought to be chest pain.  Echo normal, NM negative. Nondrinker, us liver normal, CT abdomen normal.  Suspect his mild elevation of lipase at 125 is a side effect of medication or combination of medications.  Reviewed in uptodate seroquel, wellbutrin, but none mention elevated lipase or pancreatitis as side effect.  His is however taking oxycontin bid and therefore, recommend weaning off of this medication and replace with neurontin and lidocaine patches.  He is felt safe for dc since tolerating regular low fat diet, ambulating, stable VS and cardiac disease has been ruled out.  Of note, he had mild ST on monitor and elevated BP, started on metoprolol and zocor, continue asa daily.  Cardiac stents are >1 year out.    Therefore, he is discharged in good and stable condition to home with close outpatient follow-up.    The patient met 2-midnight criteria for an inpatient stay at the time of  discharge.    Discharge Date  11/5/2018    FOLLOW UP ITEMS POST DISCHARGE  Follow up with PCP in 1 week.  Suspect medication side effect causing mild elevation of lipase and epigastric pain.  Recommend to wean off oxycontin, prescribed neurontin and lidocaine patches instead.    DISCHARGE DIAGNOSES  Active Problems:    Acute pancreatitis POA: Yes    CAD (coronary artery disease) POA: Yes    Hypertension POA: Yes    PTSD (post-traumatic stress disorder) POA: Yes    H/O heart artery stent POA: Yes    Chronic narcotic use POA: Yes  Resolved Problems:    Chest pain POA: Yes    Hyponatremia POA: Yes    Leukocytosis POA: Yes      FOLLOW UP  No future appointments.  No follow-up provider specified.    MEDICATIONS ON DISCHARGE     Medication List      START taking these medications      Instructions   gabapentin 300 MG Caps  Commonly known as:  NEURONTIN   Take 2 Caps by mouth 2 Times a Day.  Dose:  600 mg     lidocaine 5 % Ptch  Commonly known as:  LIDODERM   Apply 1 Patch to skin as directed every 24 hours.  Dose:  1 Patch     metoprolol 50 MG Tabs  Commonly known as:  LOPRESSOR   Take 1 Tab by mouth 2 Times a Day.  Dose:  50 mg     simvastatin 20 MG Tabs  Commonly known as:  ZOCOR   Take 1 Tab by mouth every evening.  Dose:  20 mg        CONTINUE taking these medications      Instructions   acetaminophen 500 MG Tabs  Commonly known as:  TYLENOL   Take 500 mg by mouth 1 time daily as needed for Moderate Pain.  Dose:  500 mg     aspirin EC 81 MG Tbec  Commonly known as:  ECOTRIN   Take 81 mg by mouth every day.  Dose:  81 mg     B-12 PO   Take 1 Dose by mouth every day. Unknown OTC Strength  Dose:  1 Dose     lisinopril 20 MG Tabs  Commonly known as:  PRINIVIL   Take 20 mg by mouth every day.  Dose:  20 mg     QUEtiapine 100 MG Tabs  Commonly known as:  SEROQUEL   Take 100 mg by mouth 3 times a day.  Dose:  100 mg     WELLBUTRIN  MG XL tablet  Generic drug:  buPROPion   Take 300 mg by mouth every morning.  Dose:  300  mg     Zinc 50 MG Tabs   Take 1 Tab by mouth every day.  Dose:  1 Tab        STOP taking these medications    oxyCODONE CR 10 MG T12a  Commonly known as:  OXYCONTIN            Allergies  Allergies   Allergen Reactions   • Pcn [Penicillins] Rash     Rash       DIET  Orders Placed This Encounter   Procedures   • Diet Order Low Fiber(GI Soft)     Standing Status:   Standing     Number of Occurrences:   1     Order Specific Question:   Diet:     Answer:   Low Fiber(GI Soft) [2]       ACTIVITY  As tolerated.  Weight bearing as tolerated    CONSULTATIONS  none    PROCEDURES    Transthoracic  Echo Report      Echocardiography Laboratory    CONCLUSIONS  Normal transthoracic echocardiogram.   No prior study is available for comparison.     EVERARDO JACKSON  Exam Date:         11/03/2018                      19:25               Myocardial Perfusion   Report   NUCLEAR IMAGING INTERPRETATION   No evidence of ischemia.   Lateral wall photopenia which is more prominent on rest images may be related      to attenuation artifact.     Normal left ventricular size, ejection fraction, and wall motion.     EVERARDO JACKSON     MRN:    [id number]         Gender:    M     Exam Date: 11/03/2018 12:59        1.  No acute abnormalities are noted on abdominal CT.  No acute abnormalities are identified on pelvic CT.   Calcified granulomas are noted in the spleen.    2.  Trace right pleural effusion and minimal opacification noted in the right lung base.   Reading Provider Reading Date   Steve Qureshi M.D. Nov 3, 2018       11/2/2018 9:31 PM    HISTORY/REASON FOR EXAM:  Pain  Abdominal pain    TECHNIQUE/EXAM DESCRIPTION AND NUMBER OF VIEWS:  Real-time sonography of the liver and biliary tree.    COMPARISON: None    FINDINGS:    The liver is normal in contour. There is no evidence of solid mass lesion. The liver measures 10.51 cm. Shadowing calcification is seen in the posterior right hepatic lobe, commonly associated with granulomatous  change.    The gallbladder is partially contracted. There is no evidence of cholelithiasis.  The gallbladder wall thickness measures 0.29 cm. There is no pericholecystic fluid.    The common duct measures 0.29 cm.    The visualized pancreas is unremarkable.    The visualized aorta is normal in caliber.    Intrahepatic IVC is patent.    The portal vein is patent with hepatopetal flow.    The right kidney measures 10.58 cm.    There is no ascites.   Impression         1.  Exam within normal limits   Reading Provider Reading Date   Gale Stoner M.D. Nov 2, 2018   Signing Provider Signing Date Signing Time   Gale Stoner M.D. Nov 2, 2018 10:24 PM       LABORATORY  Lab Results   Component Value Date    SODIUM 133 (L) 11/05/2018    POTASSIUM 4.6 11/05/2018    CHLORIDE 102 11/05/2018    CO2 27 11/05/2018    GLUCOSE 198 (H) 11/05/2018    BUN 11 11/05/2018    CREATININE 1.31 11/05/2018        Lab Results   Component Value Date    WBC 9.3 11/05/2018    HEMOGLOBIN 11.3 (L) 11/05/2018    HEMATOCRIT 33.5 (L) 11/05/2018    PLATELETCT 277 11/05/2018        Total time of the discharge process exceeds 48 minutes.

## 2018-11-06 VITALS
BODY MASS INDEX: 21.8 KG/M2 | DIASTOLIC BLOOD PRESSURE: 90 MMHG | OXYGEN SATURATION: 98 % | RESPIRATION RATE: 18 BRPM | HEART RATE: 81 BPM | SYSTOLIC BLOOD PRESSURE: 152 MMHG | TEMPERATURE: 99 F | WEIGHT: 126.98 LBS

## 2018-11-06 LAB — GLUCOSE BLD-MCNC: 230 MG/DL (ref 65–99)

## 2018-11-06 PROCEDURE — 82962 GLUCOSE BLOOD TEST: CPT

## 2018-11-06 PROCEDURE — 700102 HCHG RX REV CODE 250 W/ 637 OVERRIDE(OP): Performed by: INTERNAL MEDICINE

## 2018-11-06 PROCEDURE — 700111 HCHG RX REV CODE 636 W/ 250 OVERRIDE (IP): Performed by: INTERNAL MEDICINE

## 2018-11-06 PROCEDURE — A9270 NON-COVERED ITEM OR SERVICE: HCPCS | Performed by: STUDENT IN AN ORGANIZED HEALTH CARE EDUCATION/TRAINING PROGRAM

## 2018-11-06 PROCEDURE — A9270 NON-COVERED ITEM OR SERVICE: HCPCS | Performed by: INTERNAL MEDICINE

## 2018-11-06 PROCEDURE — 700102 HCHG RX REV CODE 250 W/ 637 OVERRIDE(OP): Performed by: HOSPITALIST

## 2018-11-06 PROCEDURE — 700102 HCHG RX REV CODE 250 W/ 637 OVERRIDE(OP): Performed by: STUDENT IN AN ORGANIZED HEALTH CARE EDUCATION/TRAINING PROGRAM

## 2018-11-06 PROCEDURE — A9270 NON-COVERED ITEM OR SERVICE: HCPCS | Performed by: HOSPITALIST

## 2018-11-06 PROCEDURE — 99239 HOSP IP/OBS DSCHRG MGMT >30: CPT | Performed by: INTERNAL MEDICINE

## 2018-11-06 RX ORDER — GABAPENTIN 300 MG/1
300 CAPSULE ORAL 3 TIMES DAILY
Qty: 90 CAP | Status: ON HOLD
Start: 2018-11-06 | End: 2018-12-28 | Stop reason: CLARIF

## 2018-11-06 RX ORDER — QUETIAPINE FUMARATE 200 MG/1
200 TABLET, FILM COATED ORAL 2 TIMES DAILY
Qty: 60 TAB | Refills: 3 | Status: ON HOLD
Start: 2018-11-06 | End: 2019-02-19

## 2018-11-06 RX ADMIN — INSULIN HUMAN 1 UNITS: 100 INJECTION, SOLUTION PARENTERAL at 13:09

## 2018-11-06 RX ADMIN — INSULIN HUMAN 3 UNITS: 100 INJECTION, SOLUTION PARENTERAL at 09:01

## 2018-11-06 RX ADMIN — GABAPENTIN 300 MG: 300 CAPSULE ORAL at 17:18

## 2018-11-06 RX ADMIN — HEPARIN SODIUM 5000 UNITS: 5000 INJECTION, SOLUTION INTRAVENOUS; SUBCUTANEOUS at 06:33

## 2018-11-06 RX ADMIN — ASPIRIN 81 MG: 81 TABLET, COATED ORAL at 06:34

## 2018-11-06 RX ADMIN — METOPROLOL TARTRATE 25 MG: 25 TABLET, FILM COATED ORAL at 17:16

## 2018-11-06 RX ADMIN — SIMVASTATIN 20 MG: 20 TABLET, FILM COATED ORAL at 17:17

## 2018-11-06 RX ADMIN — HEPARIN SODIUM 5000 UNITS: 5000 INJECTION, SOLUTION INTRAVENOUS; SUBCUTANEOUS at 13:05

## 2018-11-06 RX ADMIN — METOPROLOL TARTRATE 25 MG: 25 TABLET, FILM COATED ORAL at 06:34

## 2018-11-06 RX ADMIN — GABAPENTIN 300 MG: 300 CAPSULE ORAL at 06:34

## 2018-11-06 RX ADMIN — INSULIN HUMAN 2 UNITS: 100 INJECTION, SOLUTION PARENTERAL at 17:21

## 2018-11-06 RX ADMIN — CLOPIDOGREL 75 MG: 75 TABLET, FILM COATED ORAL at 06:33

## 2018-11-06 RX ADMIN — LISINOPRIL 20 MG: 20 TABLET ORAL at 06:34

## 2018-11-06 RX ADMIN — QUETIAPINE FUMARATE 200 MG: 25 TABLET ORAL at 17:17

## 2018-11-06 RX ADMIN — STANDARDIZED SENNA CONCENTRATE AND DOCUSATE SODIUM 2 TABLET: 8.6; 5 TABLET, FILM COATED ORAL at 17:18

## 2018-11-06 RX ADMIN — GABAPENTIN 300 MG: 300 CAPSULE ORAL at 13:05

## 2018-11-06 RX ADMIN — BUPROPION HYDROCHLORIDE 150 MG: 150 TABLET, EXTENDED RELEASE ORAL at 17:17

## 2018-11-06 RX ADMIN — BUPROPION HYDROCHLORIDE 150 MG: 150 TABLET, EXTENDED RELEASE ORAL at 06:34

## 2018-11-06 RX ADMIN — QUETIAPINE FUMARATE 200 MG: 25 TABLET ORAL at 06:33

## 2018-11-06 ASSESSMENT — PAIN SCALES - GENERAL
PAINLEVEL_OUTOF10: 4
PAINLEVEL_OUTOF10: 3

## 2018-11-06 NOTE — PROGRESS NOTES
Bedside report received, pt care assumed. Pt a&ox4, watching television. Only current complaints of not being able to turn television himself with remote. 1:1 sitter at bedside for safety. Frequent monitoring and pt within view of nursing station. Will continue to monitor.

## 2018-11-06 NOTE — PROGRESS NOTES
"Pt c/o mid-sternal/expigastic \"constant\" pain 9/10. 650mg tylenol administered for relief. Pt not on cardiac monitoring at this time, vitally stable, watching football. Dr Suarez notified, new orders received, will continue to monitor.   "

## 2018-11-06 NOTE — ASSESSMENT & PLAN NOTE
11/5:  At AL, patient states suicidal and reveals he is currently homeless without any money.  Was living in a van with his GF, GF left him 1 month ago.  He is unable to reach her by phone.

## 2018-11-06 NOTE — PROGRESS NOTES
Pt states he still has suicidal ideation with plan when he leaves the hospital. Pt states he has no where to go, states depression and anxiety r/t buying his girlfriend a van for them to live out of and she broke up with him. 1:1 sitter in place with suicide precautions per order. Will closely monitor.

## 2018-11-06 NOTE — PROGRESS NOTES
[facility] Medicine Daily Progress Note    Date of Service  11/5/2018    Chief Complaint  57 y.o. male admitted 11/2/2018 with epigastric, lower chest pain.    Hospital Course   This 56 yo male with CAD s/p stents x3, current smoker presents with epigastric lower chest pain, found to have elevated lipase on admission, no nausea or emesis.  11/3:  Repeat lipase increased, negative NM stress test.  Pain in epigastrium straight throiugh to back, no h/o alcohol use.  Ordered CT abdomen for imaging pancreas.  states no nausea, ordered full liquid diet.  Check lipid panel in a.m. Repeat lipase.  11/4:  Repeat lipase 126.  Tolerating increase in diet to GI soft.  Still c/o epigastric pain.  Not ready for dc per patient.  CT abdomen/pelvis negative.  started metoprolol 25 bid for SR  and h/o CAD with stents.  11/5:  Nurse was discussing to be discharged today and then told her he was planning to jump off a building.  Apparently, the patient and his girlfriend were living in a van and his GF left him 1 month ago.  He was recently at [facility] 1 week ago for suicidal ideation.  He states he was never on narcotics PTA, therefore, oxycontin was dc'd, home med list needs to be corrected.  He was started on a legal hold, psychiatry consulted.  Patient later requesting pain medication, neurontin 300mg tid ordered.  *     Interval Problem Update      Consultants/Specialty  none    Code Status  full    Disposition  11/5 medically cleared.    Review of Systems  Review of Systems   Constitutional: Negative for chills, diaphoresis, fever and malaise/fatigue.   HENT: Negative for congestion and sore throat.    Eyes: Negative for pain and discharge.   Respiratory: Negative for cough, hemoptysis, sputum production, shortness of breath and wheezing.    Cardiovascular: Positive for chest pain. Negative for palpitations, claudication and leg swelling.   Gastrointestinal: Positive for abdominal pain. Negative for  constipation, diarrhea, melena, nausea and vomiting.   Genitourinary: Negative for dysuria, frequency and urgency.   Musculoskeletal: Negative for back pain, joint pain, myalgias and neck pain.   Skin: Negative for itching and rash.   Neurological: Negative for dizziness, sensory change, speech change, focal weakness, loss of consciousness, weakness and headaches.   Endo/Heme/Allergies: Does not bruise/bleed easily.   Psychiatric/Behavioral: Negative for depression, substance abuse and suicidal ideas.        Physical Exam  Temp:  [36.4 °C (97.6 °F)-37.3 °C (99.2 °F)] 37.3 °C (99.1 °F)  Pulse:  [70-75] 70  Resp:  [16-18] 18  BP: (102-145)/(66-91) 144/87    Physical Exam   Constitutional: He is oriented to person, place, and time. He appears well-developed and well-nourished. No distress.   HENT:   Head: Normocephalic and atraumatic.   Mouth/Throat: Oropharynx is clear and moist. No oropharyngeal exudate.   Eyes: Pupils are equal, round, and reactive to light. Conjunctivae and EOM are normal. Right eye exhibits no discharge. Left eye exhibits no discharge. No scleral icterus.   Neck: Normal range of motion. Neck supple. No JVD present. No tracheal deviation present. No thyromegaly present.   Cardiovascular: Normal rate, regular rhythm and normal heart sounds.  Exam reveals no gallop and no friction rub.    No murmur heard.  Pulmonary/Chest: Effort normal and breath sounds normal. No respiratory distress. He has no wheezes. He has no rales. He exhibits no tenderness.   Abdominal: Soft. Bowel sounds are normal. He exhibits no distension and no mass. There is tenderness (epigastric pain with palpation). There is no rebound and no guarding.   Musculoskeletal: Normal range of motion. He exhibits no edema or tenderness.   Lymphadenopathy:     He has no cervical adenopathy.   Neurological: He is alert and oriented to person, place, and time. No cranial nerve deficit. He exhibits normal muscle tone.   Skin: Skin is warm and  dry. No rash noted. He is not diaphoretic. No erythema.   Psychiatric: He has a normal mood and affect. His behavior is normal. Judgment and thought content normal.   Nursing note and vitals reviewed.      Fluids    Intake/Output Summary (Last 24 hours) at 11/05/18 1910  Last data filed at 11/05/18 1900   Gross per 24 hour   Intake              240 ml   Output                0 ml   Net              240 ml       Laboratory  Recent Labs      11/03/18   1135  11/05/18   0256   WBC  10.3  9.3   RBC  3.57*  3.59*   HEMOGLOBIN  11.0*  11.3*   HEMATOCRIT  32.9*  33.5*   MCV  92.2  93.3   MCH  30.8  31.5   MCHC  33.4*  33.7   RDW  45.7  45.6   PLATELETCT  273  277   MPV  8.8*  9.2     Recent Labs      11/03/18   1135  11/04/18   1148  11/05/18   0256   SODIUM  131*  133*  133*   POTASSIUM  4.0  4.3  4.6   CHLORIDE  101  104  102   CO2  26  25  27   GLUCOSE  147*  155*  198*   BUN  17  10  11   CREATININE  1.10  1.11  1.31   CALCIUM  8.1*  8.5  9.2             Recent Labs      11/04/18   0131   TRIGLYCERIDE  90   HDL  43   LDL  46       Imaging  EC-ECHOCARDIOGRAM COMPLETE W/O CONT   Final Result      CT-ABDOMEN-PELVIS WITH   Final Result      1.  No acute abnormalities are noted on abdominal CT.  No acute abnormalities are identified on pelvic CT.   Calcified granulomas are noted in the spleen.      2.  Trace right pleural effusion and minimal opacification noted in the right lung base.         NM-CARDIAC STRESS TEST   Final Result      US-RUQ   Final Result         1.  Exam within normal limits      DX-CHEST-LIMITED (1 VIEW)   Final Result         No acute cardiac or pulmonary abnormality is identified.           Assessment/Plan  Acute pancreatitis- (present on admission)   Assessment & Plan    Patient reports epigastric abdominal pain  He denies drinking alcohol  Ultrasound abdomen negative for gallbladder pathology  IV fluid hydration with normal saline  Pain control with oxycodone  11/4:  Imaging with CT abdomen/pelvis  with contrast no acute pancreatitis.  Lipid panel wnl  Lipase mildly elevated at 126.  Advanced diet full liquid to GI soft.       Homelessness   Assessment & Plan    11/5:  At dc, patient states suicidal and reveals he is currently homeless without any money.  Was living in a van with his GF, GF left him 1 month ago.  He is unable to reach her by phone.     Suicidal ideation   Assessment & Plan    11/5:  Stated suicidal when heard he was getting dc'd from hospital.  Medically cleared for inpatient psychiatric facility, DC summary was done.  Legal hold, psychiatry consult appreciated.     H/O heart artery stent- (present on admission)   Assessment & Plan    By history.   Continue asa daily.       PTSD (post-traumatic stress disorder)- (present on admission)   Assessment & Plan    Continue Wellbutrin     Hypertension- (present on admission)   Assessment & Plan    Continue lisinopril     CAD (coronary artery disease)- (present on admission)   Assessment & Plan    Status post multiple stent placement  Continue aspirin, plavix and Zocor  11/4 started metoprolol 25 bid          VTE prophylaxis: heparin

## 2018-11-06 NOTE — ASSESSMENT & PLAN NOTE
11/5:  Stated suicidal when heard he was getting dc'd from hospital.  Medically cleared for inpatient psychiatric facility, DC summary was done.  Legal hold, psychiatry consult appreciated.

## 2018-11-06 NOTE — DISCHARGE PLANNING
Agency/Facility Name: [facility], [facility], [facility], [place]  Referral + Legal hold documents sent per Choice form @ 1410     Agency/Facility Name: [facility]  Spoke To: Rima  Outcome: Referral received, under review.    @152  Agency/Facility Name: [place]  Spoke To: Karen  Outcome: Referral under review.    Agency/Facility Name: [facility]  Spoke To: Crissy  Outcome: Checking for referral, if she does not find it she will call back.    Agency/Facility Name: [facility]  Spoke To: Melanie  Outcome: Did not receive referral, will call if she does not get it. Told me not to send it again.

## 2018-11-06 NOTE — PSYCHIATRY
".  PSYCHIATRIC CONSULTATION:  Reason for admission:   57 year old male with history of CAD s/p stent placement x3 presented to ED with chest pain   Reason for consult: suicidal ideation  Requesting Physician: Melanie Ibarra MD     Legal status:  On hold, extended     Chief Complaint: \"I'm not doing well at all\"      HPI:   57 year old male with history of bipolar disorder and PTSD. Patient hospitalized due to chest pain and abdominal pain with elevated lipase. Following workup of medical conditions, patient was started on metoprolol and zocor. Pancreatitis was ruled out on CT and suspected to be due to medications. At time of discharge, patient expressed suicidal ideation with plan to jump off of roof to nursing at which time legal hold was initiated for patient safety. Patient in room sitting in chair at time of interview, tearful throughout stating \"there's just no more hope\" several times during interview. Patient was in a relationship for 5 years until recently and feels this has been a large stressor contributing to depressed mood and suicidal ideation. He states he went to attempt suicide approximately one month ago following the end of this relationship, at which time a friend of his stopped him. He was living in a van with his girlfriend prior to this, and has subsequently become homeless without a source of shelter. He states his food stamps and benefits were with her as well and he has therefore been unable to acquire these. He notes feeling overwhelmed when thinking about the future \"and what is out there for me when I leave here.\" He endorses suicidal ideation with plan to jump off of a building. Patient specifies dimensions and height of building he would look for stating \"I've put a lot of thought into this.\" He states he was recently hospitalized at [facility], following discharge he lost his backpack with all of his medications and belongings in it. He states his money was present in the " "bag as well and he has no further income available at this time. He states his mood is greatly improved when he is stable on his medications, outpatient doses of Wellbutrin 150mg BID and Seroquel 150mg BID, 300mg at bedtime.       Psychiatric Review of Systems:current symptoms as reported by pt.  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 All systems reviewed. Only those found to be + are noted below. All others are negative.   Neurological:    TBIs: + previous loss of consciousness    SZs: denies   Strokes: denies  Other medical symptoms:   Thyroid: denies   Diabetes: + Insulin dependent    Cardiovascular disease: + CAD s/p stent x3    Psychiatric Examination: observed phenomenon:  Vitals:   Vitals:    11/05/18 0000 11/05/18 0400 11/05/18 0800 11/05/18 1600   BP: 110/66 122/85 145/91 144/87   Pulse: 73 70 73 70   Resp: 17 16 16 18   Temp: 36.4 °C (97.6 °F) 36.7 °C (98.1 °F) 37.3 °C (99.2 °F) 37.3 °C (99.1 °F)   SpO2: 92% 98% 96% 98%   Weight:         Musculoskeletal: no psychomotor agitation or retardation noted  Appearance: 57 year old male appropriate to stated age in hospital attire sitting in corner of room in chair  Thoughts: Tangential   Speech: regular volume, regular rate, no slurring of speech, no stuttering   Mood: \"I'm depressed\"  Affect: Depressed, tearful  SI/HI:   Endorses/denies  Attention/Alertness:  Alert and oriented to self, place, situation   Memory: appears grossly in tact  Fund of Knowledge: appears grossly in tact   Insight/Judgement into symptoms: fair/fair     Past Psychiatric Hx:   Previous hospitalization - most recently [facility]  Chart Review indicates hospitalization in [place] in [place] - patient states he has had numerous other hospitalizations at [facility] and [place] as well, as well as in [place], [place]  Hx Suicide Attempts - most recently 1 month ago states his friend stopped him, becomes increasingly tearful on further questioning and unable to state details of attempt  Patient states he has taken Seroquel for several years with benefit, he states he takes Prazosin at bedtime as well to aid with nightmares      Family Psychiatric Hx:Denies     Social Hx:   Patient currently homeless, income from [employer] monthly, states ex girlfriend was only support system within his life, relationship ended after 5 years approximately 1 month ago. Patient expresses increased guilt and distress over end of relationship.    Drug/Alcohol/Tobacco Hx:   Drugs: +marijuana   Alcohol:denies   Tobacco: +    Medical Hx: labs, MARS, medications, etc were reviewed. Only those findings of potential interest to psychiatry are noted below:    Allergies: Pcn [penicillins]  Medications: Reviewed  Labs:   Lipase 166  CBC, CMP within normal limits  ECG: QTc 449    Cranial Imaging: reviewed    ASSESSMENT:   Bipolar Disorder, major depressive episode with mood congruent psychotic features        PLAN  -Increase Seroquel 200mg BID  -Patient continues to express suicidal ideation with plan and intent, will require continued care with possible transfer to psychiatric facility for stabilization of psychiatric symptoms     Legal status: on hold, extended   Anticipate F/U within 48 hours.     Thank you for the consult.

## 2018-11-07 NOTE — PROGRESS NOTES
Received notice from [facility] that patient has been accepted and if transport available could be admitted this evening.  Notified Dr. Grimm and paged on call .

## 2018-11-07 NOTE — PROGRESS NOTES
Discharge orders received. All lines and monitors discontinued. Reviewed discharge paperwork with patient.   Checked room and patient drawer for personal belongings and patient verified all personal belongings accounted for and leaving with patient. Discussed diet, activity, medications, follow up care and worsening symptoms. No questions at this time. Pt to be discharged to [facility] via [facility].

## 2018-11-07 NOTE — DISCHARGE SUMMARY
Discharge Summary    CHIEF COMPLAINT ON ADMISSION  Chief Complaint   Patient presents with   • Chest Pain     x 2 hours       Reason for Admission  EMS 05     CODE STATUS  Full Code    HPI & HOSPITAL COURSE  This is a 57 y.o. male here with CAD s/p stents x3, current smoker presents with epigastric lower chest pain, found to have elevated lipase on admission, no nausea or emesis.  11/3:  Repeat lipase increased, negative NM stress test.  Pain in epigastrium straight throiugh to back, no h/o alcohol use.  Ordered CT abdomen for imaging pancreas.  states no nausea, ordered full liquid diet.  Check lipid panel in a.m. Repeat lipase.  11/4:  Repeat lipase 126.  Tolerating increase in diet to GI soft.  Still c/o epigastric pain.  Not ready for dc per patient.  CT abdomen/pelvis negative.  started metoprolol 25 bid for SR  and h/o CAD with stents.*      The patient requested a regular diet and was tolerating it despite complaining of epigastric pain.  Of note, this is the pain he was complaining of on admission, thought to be chest pain.  Echo normal, NM negative. Nondrinker, us liver normal, CT abdomen normal.  Suspect his mild elevation of lipase at 125 is a side effect of medication or combination of medications.  Reviewed in uptodate seroquel, wellbutrin, but none mention elevated lipase or pancreatitis as side effect.  His is however taking oxycontin bid and therefore, recommend weaning off of this medication and replace with neurontin and lidocaine patches.  He is felt safe for dc since tolerating regular low fat diet, ambulating, stable VS and cardiac disease has been ruled out.  Of note, he had mild ST on monitor and elevated BP, started on metoprolol and zocor, continue asa daily.  Cardiac stents are >1 year out.    Prior to discharge, patient expressed wanting to jump off a building. He was recently at [facility] for suicidal ideation.  Patient still elicits suicidal ideation and depression.   Dietary was consulted and he was placed on a legal hold.  His Seroquel was increased.       Therefore, he is discharged in good and stable condition to a psychiatric hospital.    The patient met 2-midnight criteria for an inpatient stay at the time of discharge.      FOLLOW UP ITEMS POST DISCHARGE  Follow up with PCP in 1 week.  Suspect medication side effect causing mild elevation of lipase and epigastric pain.  Recommend to wean off oxycontin, prescribed neurontin and lidocaine patches instead.    DISCHARGE DIAGNOSES  Active Problems:    Acute pancreatitis POA: Yes    CAD (coronary artery disease) POA: Yes    Hypertension POA: Yes    PTSD (post-traumatic stress disorder) POA: Yes    H/O heart artery stent POA: Yes    Suicidal ideation POA: Yes    Homelessness POA: Yes  Resolved Problems:    Chest pain POA: Yes    Hyponatremia POA: Yes    Leukocytosis POA: Yes      FOLLOW UP  Future Appointments  Date Time Provider Department Center   11/14/2018 9:20 AM [facility] None     No follow-up provider specified.    MEDICATIONS ON DISCHARGE     Medication List      START taking these medications      Instructions   gabapentin 300 MG Caps  Commonly known as:  NEURONTIN   Take 1 Cap by mouth 3 times a day.  Dose:  300 mg     lidocaine 5 % Ptch  Commonly known as:  LIDODERM   Apply 1 Patch to skin as directed every 24 hours.  Dose:  1 Patch     metoprolol 50 MG Tabs  Commonly known as:  LOPRESSOR   Take 1 Tab by mouth 2 Times a Day.  Dose:  50 mg     simvastatin 20 MG Tabs  Commonly known as:  ZOCOR   Take 1 Tab by mouth every evening.  Dose:  20 mg        CHANGE how you take these medications      Instructions   QUEtiapine 200 MG Tabs  What changed:  · medication strength  · how much to take  · when to take this  Commonly known as:  SEROQUEL   Take 1 Tab by mouth 2 Times a Day.  Dose:  200 mg        CONTINUE taking these medications      Instructions   acetaminophen 500 MG Tabs  Commonly known as:   TYLENOL   Take 500 mg by mouth 1 time daily as needed for Moderate Pain.  Dose:  500 mg     aspirin EC 81 MG Tbec  Commonly known as:  ECOTRIN   Take 81 mg by mouth every day.  Dose:  81 mg     B-12 PO   Take 1 Dose by mouth every day. Unknown OTC Strength  Dose:  1 Dose     lisinopril 20 MG Tabs  Commonly known as:  PRINIVIL   Take 20 mg by mouth every day.  Dose:  20 mg     WELLBUTRIN  MG XL tablet  Generic drug:  buPROPion   Take 300 mg by mouth every morning.  Dose:  300 mg     Zinc 50 MG Tabs   Take 1 Tab by mouth every day.  Dose:  1 Tab        STOP taking these medications    oxyCODONE CR 10 MG T12a  Commonly known as:  OXYCONTIN            Allergies  Allergies   Allergen Reactions   • Pcn [Penicillins] Rash     Rash       DIET  Orders Placed This Encounter   Procedures   • Diet Order Low Fiber(GI Soft), Diabetic     Standing Status:   Standing     Number of Occurrences:   1     Order Specific Question:   Diet:     Answer:   Low Fiber(GI Soft) [2]     Order Specific Question:   Diet:     Answer:   Diabetic [3]     Order Specific Question:   Miscellaneous modifications:     Answer:   Finger Foods  [2]       ACTIVITY  As tolerated.  Weight bearing as tolerated    LINES, DRAINS, AND WOUNDS  This is an automated list. Peripheral IVs will be removed prior to discharge.  Peripheral IV 11/04/18 20 G Right Forearm (Active)   Site Assessment Clean;Dry;Intact 11/6/2018  8:00 AM   Dressing Type Transparent 11/6/2018  8:00 AM   Line Status Flushed;Saline locked 11/6/2018  8:00 AM   Dressing Status Clean;Dry;Intact 11/6/2018  8:00 AM   Dressing Intervention N/A 11/4/2018 11:00 AM   Date Primary Tubing Changed 11/06/18 11/4/2018 11:00 AM   NEXT Primary Tubing Change  11/10/18 11/4/2018 11:00 AM   Infiltration Grading ([facility]) 0 11/6/2018  8:00 AM   Phlebitis Scale ([facility] Only) 0 11/5/2018  8:00 PM          Peripheral IV 11/04/18 20 G Right Forearm (Active)   Site Assessment Clean;Dry;Intact 11/6/2018  8:00 AM    Dressing Type Transparent 11/6/2018  8:00 AM   Line Status Flushed;Saline locked 11/6/2018  8:00 AM   Dressing Status Clean;Dry;Intact 11/6/2018  8:00 AM   Dressing Intervention N/A 11/4/2018 11:00 AM   Date Primary Tubing Changed 11/06/18 11/4/2018 11:00 AM   NEXT Primary Tubing Change  11/10/18 11/4/2018 11:00 AM   Infil[place] Grading [place] 0 11/6/2018  8:00 AM   Phlebitis Scale ([facility] Only) 0 11/5/2018  8:00 PM               MENTAL STATUS ON TRANSFER  Level of Consciousness: Alert  Orientation : Oriented x 4  Speech: Speech Clear    CONSULTATIONS  Psych    PROCEDURES  EC-ECHOCARDIOGRAM COMPLETE W/O CONT   Final Result      CT-ABDOMEN-PELVIS WITH   Final Result      1.  No acute abnormalities are noted on abdominal CT.  No acute abnormalities are identified on pelvic CT.   Calcified granulomas are noted in the spleen.      2.  Trace right pleural effusion and minimal opacification noted in the right lung base.         NM-CARDIAC STRESS TEST   Final Result      US-RUQ   Final Result         1.  Exam within normal limits      DX-CHEST-LIMITED (1 VIEW)   Final Result         No acute cardiac or pulmonary abnormality is identified.            LABORATORY  Lab Results   Component Value Date    SODIUM 133 (L) 11/05/2018    POTASSIUM 4.6 11/05/2018    CHLORIDE 102 11/05/2018    CO2 27 11/05/2018    GLUCOSE 198 (H) 11/05/2018    BUN 11 11/05/2018    CREATININE 1.31 11/05/2018        Lab Results   Component Value Date    WBC 9.3 11/05/2018    HEMOGLOBIN 11.3 (L) 11/05/2018    HEMATOCRIT 33.5 (L) 11/05/2018    PLATELETCT 277 11/05/2018        Total time of the discharge process exceeds 34 minutes.

## 2018-11-07 NOTE — DISCHARGE PLANNING
Medical Social Work    Referral: Legal hold Transfer to Mental Health Facility    Intervention: SW received call from Shruthi at [facility] stating that Dr. Mccracken has accepted the patient for admission.     SW arranged for transportation to be set up through [facility]    Pt has transport benefit through [facility].    The pt will be picked up at 1800    SW notified the RN of the departure time as well as accepting facility.     MISA created transfer packet and placed on chart. Original Legal Hold placed in packet.     Plan: Pt will transfer to [facility] at 1800

## 2018-11-08 ENCOUNTER — HOSPITAL ENCOUNTER (OUTPATIENT)
Facility: MEDICAL CENTER | Age: 58
End: 2018-11-09
Attending: EMERGENCY MEDICINE | Admitting: INTERNAL MEDICINE
Payer: MEDICARE

## 2018-11-08 ENCOUNTER — APPOINTMENT (OUTPATIENT)
Dept: RADIOLOGY | Facility: MEDICAL CENTER | Age: 58
End: 2018-11-08
Attending: EMERGENCY MEDICINE
Payer: MEDICARE

## 2018-11-08 DIAGNOSIS — N17.9 ACUTE RENAL FAILURE, UNSPECIFIED ACUTE RENAL FAILURE TYPE (HCC): ICD-10-CM

## 2018-11-08 DIAGNOSIS — I95.9 HYPOTENSION, UNSPECIFIED HYPOTENSION TYPE: ICD-10-CM

## 2018-11-08 PROBLEM — N18.9 ACUTE ON CHRONIC KIDNEY FAILURE (HCC): Status: ACTIVE | Noted: 2018-11-08

## 2018-11-08 PROBLEM — E11.65 TYPE 2 DIABETES MELLITUS WITH HYPERGLYCEMIA, WITHOUT LONG-TERM CURRENT USE OF INSULIN (HCC): Status: ACTIVE | Noted: 2018-11-08

## 2018-11-08 PROBLEM — F99 PSYCHIATRIC DISORDER: Status: ACTIVE | Noted: 2018-11-08

## 2018-11-08 LAB
ALBUMIN SERPL BCP-MCNC: 3.9 G/DL (ref 3.2–4.9)
ALBUMIN/GLOB SERPL: 1.6 G/DL
ALP SERPL-CCNC: 73 U/L (ref 30–99)
ALT SERPL-CCNC: 20 U/L (ref 2–50)
ANION GAP SERPL CALC-SCNC: 12 MMOL/L (ref 0–11.9)
APPEARANCE UR: CLEAR
APTT PPP: 31.8 SEC (ref 24.7–36)
AST SERPL-CCNC: 10 U/L (ref 12–45)
BACTERIA BLD CULT: NORMAL
BASOPHILS # BLD AUTO: 0.3 % (ref 0–1.8)
BASOPHILS # BLD: 0.03 K/UL (ref 0–0.12)
BILIRUB SERPL-MCNC: 0.7 MG/DL (ref 0.1–1.5)
BILIRUB UR QL STRIP.AUTO: NEGATIVE
BNP SERPL-MCNC: 18 PG/ML (ref 0–100)
BUN SERPL-MCNC: 40 MG/DL (ref 8–22)
CALCIUM SERPL-MCNC: 9.1 MG/DL (ref 8.5–10.5)
CHLORIDE SERPL-SCNC: 96 MMOL/L (ref 96–112)
CK SERPL-CCNC: 41 U/L (ref 0–154)
CO2 SERPL-SCNC: 23 MMOL/L (ref 20–33)
COLOR UR: YELLOW
CREAT SERPL-MCNC: 1.9 MG/DL (ref 0.5–1.4)
CREAT UR-MCNC: 39.4 MG/DL
EKG IMPRESSION: NORMAL
EOSINOPHIL # BLD AUTO: 0.31 K/UL (ref 0–0.51)
EOSINOPHIL NFR BLD: 2.7 % (ref 0–6.9)
ERYTHROCYTE [DISTWIDTH] IN BLOOD BY AUTOMATED COUNT: 47.5 FL (ref 35.9–50)
GLOBULIN SER CALC-MCNC: 2.4 G/DL (ref 1.9–3.5)
GLUCOSE BLD-MCNC: 203 MG/DL (ref 65–99)
GLUCOSE SERPL-MCNC: 286 MG/DL (ref 65–99)
GLUCOSE UR STRIP.AUTO-MCNC: 250 MG/DL
HCT VFR BLD AUTO: 36.6 % (ref 42–52)
HGB BLD-MCNC: 12.4 G/DL (ref 14–18)
IMM GRANULOCYTES # BLD AUTO: 0.11 K/UL (ref 0–0.11)
IMM GRANULOCYTES NFR BLD AUTO: 1 % (ref 0–0.9)
INR PPP: 0.98 (ref 0.87–1.13)
KETONES UR STRIP.AUTO-MCNC: NEGATIVE MG/DL
LEUKOCYTE ESTERASE UR QL STRIP.AUTO: NEGATIVE
LIPASE SERPL-CCNC: 19 U/L (ref 11–82)
LYMPHOCYTES # BLD AUTO: 1.12 K/UL (ref 1–4.8)
LYMPHOCYTES NFR BLD: 9.8 % (ref 22–41)
MCH RBC QN AUTO: 31.5 PG (ref 27–33)
MCHC RBC AUTO-ENTMCNC: 33.9 G/DL (ref 33.7–35.3)
MCV RBC AUTO: 92.9 FL (ref 81.4–97.8)
MICRO URNS: ABNORMAL
MONOCYTES # BLD AUTO: 0.84 K/UL (ref 0–0.85)
MONOCYTES NFR BLD AUTO: 7.3 % (ref 0–13.4)
NEUTROPHILS # BLD AUTO: 9.06 K/UL (ref 1.82–7.42)
NEUTROPHILS NFR BLD: 78.9 % (ref 44–72)
NITRITE UR QL STRIP.AUTO: NEGATIVE
NRBC # BLD AUTO: 0 K/UL
NRBC BLD-RTO: 0 /100 WBC
PH UR STRIP.AUTO: 6 [PH]
PLATELET # BLD AUTO: 376 K/UL (ref 164–446)
PMV BLD AUTO: 9.1 FL (ref 9–12.9)
POTASSIUM SERPL-SCNC: 4.3 MMOL/L (ref 3.6–5.5)
PROT SERPL-MCNC: 6.3 G/DL (ref 6–8.2)
PROT UR QL STRIP: NEGATIVE MG/DL
PROTHROMBIN TIME: 13.1 SEC (ref 12–14.6)
RBC # BLD AUTO: 3.94 M/UL (ref 4.7–6.1)
RBC UR QL AUTO: NEGATIVE
SIGNIFICANT IND 70042: NORMAL
SITE SITE: NORMAL
SODIUM SERPL-SCNC: 131 MMOL/L (ref 135–145)
SODIUM UR-SCNC: 60 MMOL/L
SOURCE SOURCE: NORMAL
SP GR UR STRIP.AUTO: 1.01
TROPONIN I SERPL-MCNC: <0.01 NG/ML (ref 0–0.04)
UROBILINOGEN UR STRIP.AUTO-MCNC: 1 MG/DL
WBC # BLD AUTO: 11.5 K/UL (ref 4.8–10.8)

## 2018-11-08 PROCEDURE — 81003 URINALYSIS AUTO W/O SCOPE: CPT

## 2018-11-08 PROCEDURE — 96374 THER/PROPH/DIAG INJ IV PUSH: CPT

## 2018-11-08 PROCEDURE — 93005 ELECTROCARDIOGRAM TRACING: CPT | Performed by: EMERGENCY MEDICINE

## 2018-11-08 PROCEDURE — 82550 ASSAY OF CK (CPK): CPT

## 2018-11-08 PROCEDURE — G0378 HOSPITAL OBSERVATION PER HR: HCPCS

## 2018-11-08 PROCEDURE — 93005 ELECTROCARDIOGRAM TRACING: CPT

## 2018-11-08 PROCEDURE — 700102 HCHG RX REV CODE 250 W/ 637 OVERRIDE(OP): Performed by: EMERGENCY MEDICINE

## 2018-11-08 PROCEDURE — 36415 COLL VENOUS BLD VENIPUNCTURE: CPT

## 2018-11-08 PROCEDURE — 99204 OFFICE O/P NEW MOD 45 MIN: CPT | Mod: 25 | Performed by: PSYCHIATRY & NEUROLOGY

## 2018-11-08 PROCEDURE — 85025 COMPLETE CBC W/AUTO DIFF WBC: CPT

## 2018-11-08 PROCEDURE — A9270 NON-COVERED ITEM OR SERVICE: HCPCS | Performed by: PSYCHIATRY & NEUROLOGY

## 2018-11-08 PROCEDURE — 82962 GLUCOSE BLOOD TEST: CPT

## 2018-11-08 PROCEDURE — 700102 HCHG RX REV CODE 250 W/ 637 OVERRIDE(OP): Performed by: INTERNAL MEDICINE

## 2018-11-08 PROCEDURE — 85730 THROMBOPLASTIN TIME PARTIAL: CPT

## 2018-11-08 PROCEDURE — 96372 THER/PROPH/DIAG INJ SC/IM: CPT

## 2018-11-08 PROCEDURE — 99220 PR INITIAL OBSERVATION CARE,LEVL III: CPT | Performed by: INTERNAL MEDICINE

## 2018-11-08 PROCEDURE — A9270 NON-COVERED ITEM OR SERVICE: HCPCS | Performed by: INTERNAL MEDICINE

## 2018-11-08 PROCEDURE — 700105 HCHG RX REV CODE 258: Performed by: EMERGENCY MEDICINE

## 2018-11-08 PROCEDURE — 700102 HCHG RX REV CODE 250 W/ 637 OVERRIDE(OP): Performed by: PSYCHIATRY & NEUROLOGY

## 2018-11-08 PROCEDURE — 71045 X-RAY EXAM CHEST 1 VIEW: CPT

## 2018-11-08 PROCEDURE — 84300 ASSAY OF URINE SODIUM: CPT

## 2018-11-08 PROCEDURE — 82570 ASSAY OF URINE CREATININE: CPT

## 2018-11-08 PROCEDURE — 700111 HCHG RX REV CODE 636 W/ 250 OVERRIDE (IP): Performed by: INTERNAL MEDICINE

## 2018-11-08 PROCEDURE — 700105 HCHG RX REV CODE 258: Performed by: INTERNAL MEDICINE

## 2018-11-08 PROCEDURE — 94760 N-INVAS EAR/PLS OXIMETRY 1: CPT

## 2018-11-08 PROCEDURE — 83880 ASSAY OF NATRIURETIC PEPTIDE: CPT

## 2018-11-08 PROCEDURE — 83690 ASSAY OF LIPASE: CPT

## 2018-11-08 PROCEDURE — 99285 EMERGENCY DEPT VISIT HI MDM: CPT

## 2018-11-08 PROCEDURE — A9270 NON-COVERED ITEM OR SERVICE: HCPCS | Performed by: EMERGENCY MEDICINE

## 2018-11-08 PROCEDURE — 80053 COMPREHEN METABOLIC PANEL: CPT

## 2018-11-08 PROCEDURE — 85610 PROTHROMBIN TIME: CPT

## 2018-11-08 PROCEDURE — 84484 ASSAY OF TROPONIN QUANT: CPT

## 2018-11-08 RX ORDER — PROMETHAZINE HYDROCHLORIDE 25 MG/1
12.5-25 TABLET ORAL EVERY 4 HOURS PRN
Status: DISCONTINUED | OUTPATIENT
Start: 2018-11-08 | End: 2018-11-08

## 2018-11-08 RX ORDER — MORPHINE SULFATE 4 MG/ML
1-4 INJECTION, SOLUTION INTRAMUSCULAR; INTRAVENOUS
Status: DISCONTINUED | OUTPATIENT
Start: 2018-11-08 | End: 2018-11-08

## 2018-11-08 RX ORDER — LIDOCAINE 50 MG/G
1 PATCH TOPICAL EVERY 24 HOURS
Status: DISCONTINUED | OUTPATIENT
Start: 2018-11-08 | End: 2018-11-09 | Stop reason: HOSPADM

## 2018-11-08 RX ORDER — CLOPIDOGREL BISULFATE 75 MG/1
75 TABLET ORAL DAILY
Status: DISCONTINUED | OUTPATIENT
Start: 2018-11-08 | End: 2018-11-09 | Stop reason: HOSPADM

## 2018-11-08 RX ORDER — CLOPIDOGREL BISULFATE 75 MG/1
75 TABLET ORAL DAILY
Status: ON HOLD | COMMUNITY
End: 2018-12-28 | Stop reason: CLARIF

## 2018-11-08 RX ORDER — BUPROPION HYDROCHLORIDE 150 MG/1
150 TABLET, EXTENDED RELEASE ORAL 2 TIMES DAILY
Status: DISCONTINUED | OUTPATIENT
Start: 2018-11-08 | End: 2018-11-09 | Stop reason: HOSPADM

## 2018-11-08 RX ORDER — BUPROPION HYDROCHLORIDE 150 MG/1
300 TABLET, EXTENDED RELEASE ORAL DAILY
COMMUNITY
End: 2019-10-19

## 2018-11-08 RX ORDER — SODIUM CHLORIDE 9 MG/ML
1000 INJECTION, SOLUTION INTRAVENOUS ONCE
Status: COMPLETED | OUTPATIENT
Start: 2018-11-08 | End: 2018-11-08

## 2018-11-08 RX ORDER — OXYCODONE HYDROCHLORIDE 5 MG/1
5 TABLET ORAL
Status: DISCONTINUED | OUTPATIENT
Start: 2018-11-08 | End: 2018-11-08

## 2018-11-08 RX ORDER — HEPARIN SODIUM 5000 [USP'U]/ML
5000 INJECTION, SOLUTION INTRAVENOUS; SUBCUTANEOUS EVERY 8 HOURS
Status: DISCONTINUED | OUTPATIENT
Start: 2018-11-08 | End: 2018-11-09 | Stop reason: HOSPADM

## 2018-11-08 RX ORDER — PROMETHAZINE HYDROCHLORIDE 25 MG/1
12.5-25 SUPPOSITORY RECTAL EVERY 4 HOURS PRN
Status: DISCONTINUED | OUTPATIENT
Start: 2018-11-08 | End: 2018-11-08

## 2018-11-08 RX ORDER — QUETIAPINE FUMARATE 100 MG/1
300 TABLET, FILM COATED ORAL EVERY EVENING
Status: DISCONTINUED | OUTPATIENT
Start: 2018-11-08 | End: 2018-11-09 | Stop reason: HOSPADM

## 2018-11-08 RX ORDER — QUETIAPINE FUMARATE 100 MG/1
200 TABLET, FILM COATED ORAL DAILY
Status: DISCONTINUED | OUTPATIENT
Start: 2018-11-09 | End: 2018-11-09 | Stop reason: HOSPADM

## 2018-11-08 RX ORDER — DEXTROSE MONOHYDRATE 25 G/50ML
25 INJECTION, SOLUTION INTRAVENOUS
Status: DISCONTINUED | OUTPATIENT
Start: 2018-11-08 | End: 2018-11-09 | Stop reason: HOSPADM

## 2018-11-08 RX ORDER — MORPHINE SULFATE 4 MG/ML
1 INJECTION, SOLUTION INTRAMUSCULAR; INTRAVENOUS ONCE
Status: COMPLETED | OUTPATIENT
Start: 2018-11-08 | End: 2018-11-08

## 2018-11-08 RX ORDER — QUETIAPINE FUMARATE 100 MG/1
200 TABLET, FILM COATED ORAL 2 TIMES DAILY
Status: DISCONTINUED | OUTPATIENT
Start: 2018-11-08 | End: 2018-11-08

## 2018-11-08 RX ORDER — ALUMINA, MAGNESIA, AND SIMETHICONE 2400; 2400; 240 MG/30ML; MG/30ML; MG/30ML
30 SUSPENSION ORAL 4 TIMES DAILY PRN
Status: ON HOLD | COMMUNITY
End: 2018-11-09

## 2018-11-08 RX ORDER — SIMVASTATIN 20 MG
20 TABLET ORAL EVERY EVENING
Status: DISCONTINUED | OUTPATIENT
Start: 2018-11-08 | End: 2018-11-09 | Stop reason: HOSPADM

## 2018-11-08 RX ORDER — ASPIRIN 300 MG/1
300 SUPPOSITORY RECTAL ONCE
Status: COMPLETED | OUTPATIENT
Start: 2018-11-08 | End: 2018-11-08

## 2018-11-08 RX ORDER — GABAPENTIN 300 MG/1
300 CAPSULE ORAL 3 TIMES DAILY
Status: DISCONTINUED | OUTPATIENT
Start: 2018-11-08 | End: 2018-11-09 | Stop reason: HOSPADM

## 2018-11-08 RX ORDER — ONDANSETRON 2 MG/ML
4 INJECTION INTRAMUSCULAR; INTRAVENOUS EVERY 4 HOURS PRN
Status: DISCONTINUED | OUTPATIENT
Start: 2018-11-08 | End: 2018-11-08

## 2018-11-08 RX ORDER — HYDROMORPHONE HYDROCHLORIDE 1 MG/ML
0.5 INJECTION, SOLUTION INTRAMUSCULAR; INTRAVENOUS; SUBCUTANEOUS
Status: DISCONTINUED | OUTPATIENT
Start: 2018-11-08 | End: 2018-11-08

## 2018-11-08 RX ORDER — OXYCODONE HYDROCHLORIDE 10 MG/1
10 TABLET ORAL
Status: DISCONTINUED | OUTPATIENT
Start: 2018-11-08 | End: 2018-11-08

## 2018-11-08 RX ORDER — ASPIRIN 81 MG/1
324 TABLET, CHEWABLE ORAL ONCE
Status: COMPLETED | OUTPATIENT
Start: 2018-11-08 | End: 2018-11-08

## 2018-11-08 RX ORDER — OXYCODONE HYDROCHLORIDE 5 MG/1
5 TABLET ORAL
Status: DISCONTINUED | OUTPATIENT
Start: 2018-11-08 | End: 2018-11-09

## 2018-11-08 RX ORDER — NITROGLYCERIN 0.4 MG/1
0.4 TABLET SUBLINGUAL
Status: ON HOLD | COMMUNITY
End: 2018-11-09

## 2018-11-08 RX ORDER — POLYETHYLENE GLYCOL 3350 17 G/17G
1 POWDER, FOR SOLUTION ORAL
Status: DISCONTINUED | OUTPATIENT
Start: 2018-11-08 | End: 2018-11-09 | Stop reason: HOSPADM

## 2018-11-08 RX ORDER — BISACODYL 10 MG
10 SUPPOSITORY, RECTAL RECTAL
Status: DISCONTINUED | OUTPATIENT
Start: 2018-11-08 | End: 2018-11-09 | Stop reason: HOSPADM

## 2018-11-08 RX ORDER — AMOXICILLIN 250 MG
2 CAPSULE ORAL 2 TIMES DAILY
Status: DISCONTINUED | OUTPATIENT
Start: 2018-11-08 | End: 2018-11-09 | Stop reason: HOSPADM

## 2018-11-08 RX ORDER — ONDANSETRON 4 MG/1
4 TABLET, ORALLY DISINTEGRATING ORAL EVERY 4 HOURS PRN
Status: DISCONTINUED | OUTPATIENT
Start: 2018-11-08 | End: 2018-11-08

## 2018-11-08 RX ORDER — PRAZOSIN HYDROCHLORIDE 2 MG/1
2 CAPSULE ORAL NIGHTLY
Status: ON HOLD | COMMUNITY
End: 2018-11-09

## 2018-11-08 RX ORDER — SODIUM CHLORIDE 9 MG/ML
INJECTION, SOLUTION INTRAVENOUS CONTINUOUS
Status: DISCONTINUED | OUTPATIENT
Start: 2018-11-08 | End: 2018-11-09

## 2018-11-08 RX ORDER — ACETAMINOPHEN 325 MG/1
650 TABLET ORAL EVERY 6 HOURS PRN
Status: DISCONTINUED | OUTPATIENT
Start: 2018-11-08 | End: 2018-11-08

## 2018-11-08 RX ORDER — OXYCODONE HYDROCHLORIDE 10 MG/1
10 TABLET ORAL
Status: DISCONTINUED | OUTPATIENT
Start: 2018-11-08 | End: 2018-11-09

## 2018-11-08 RX ORDER — ACETAMINOPHEN 500 MG
1000 TABLET ORAL EVERY 8 HOURS
Status: DISCONTINUED | OUTPATIENT
Start: 2018-11-08 | End: 2018-11-09

## 2018-11-08 RX ADMIN — OXYCODONE HYDROCHLORIDE 10 MG: 10 TABLET ORAL at 21:09

## 2018-11-08 RX ADMIN — SODIUM CHLORIDE: 9 INJECTION, SOLUTION INTRAVENOUS at 17:13

## 2018-11-08 RX ADMIN — GABAPENTIN 300 MG: 300 CAPSULE ORAL at 17:13

## 2018-11-08 RX ADMIN — BUPROPION HYDROCHLORIDE 150 MG: 150 TABLET, EXTENDED RELEASE ORAL at 17:13

## 2018-11-08 RX ADMIN — INSULIN HUMAN 2 UNITS: 100 INJECTION, SOLUTION PARENTERAL at 21:13

## 2018-11-08 RX ADMIN — SIMVASTATIN 20 MG: 20 TABLET, FILM COATED ORAL at 17:13

## 2018-11-08 RX ADMIN — SODIUM CHLORIDE 1000 ML: 9 INJECTION, SOLUTION INTRAVENOUS at 11:18

## 2018-11-08 RX ADMIN — INSULIN HUMAN 2 UNITS: 100 INJECTION, SOLUTION PARENTERAL at 18:34

## 2018-11-08 RX ADMIN — ACETAMINOPHEN 1000 MG: 500 TABLET ORAL at 17:14

## 2018-11-08 RX ADMIN — ASPIRIN 324 MG: 81 TABLET, CHEWABLE ORAL at 10:51

## 2018-11-08 RX ADMIN — CLOPIDOGREL 75 MG: 75 TABLET, FILM COATED ORAL at 17:13

## 2018-11-08 RX ADMIN — HEPARIN SODIUM 5000 UNITS: 5000 INJECTION, SOLUTION INTRAVENOUS; SUBCUTANEOUS at 21:06

## 2018-11-08 RX ADMIN — SODIUM CHLORIDE 1000 ML: 9 INJECTION, SOLUTION INTRAVENOUS at 12:14

## 2018-11-08 RX ADMIN — QUETIAPINE FUMARATE 300 MG: 100 TABLET ORAL at 21:06

## 2018-11-08 RX ADMIN — MORPHINE SULFATE 1 MG: 4 INJECTION INTRAVENOUS at 13:15

## 2018-11-08 RX ADMIN — HEPARIN SODIUM 5000 UNITS: 5000 INJECTION, SOLUTION INTRAVENOUS; SUBCUTANEOUS at 17:14

## 2018-11-08 RX ADMIN — STANDARDIZED SENNA CONCENTRATE AND DOCUSATE SODIUM 2 TABLET: 8.6; 5 TABLET, FILM COATED ORAL at 17:13

## 2018-11-08 RX ADMIN — QUETIAPINE FUMARATE 200 MG: 100 TABLET ORAL at 17:13

## 2018-11-08 RX ADMIN — OXYCODONE HYDROCHLORIDE 10 MG: 10 TABLET ORAL at 17:13

## 2018-11-08 RX ADMIN — ACETAMINOPHEN 1000 MG: 500 TABLET ORAL at 21:06

## 2018-11-08 ASSESSMENT — ENCOUNTER SYMPTOMS
DIZZINESS: 0
BLURRED VISION: 0
CHILLS: 0
COUGH: 0
NERVOUS/ANXIOUS: 0
SPUTUM PRODUCTION: 0
ORTHOPNEA: 0
NECK PAIN: 0
DEPRESSION: 1
DOUBLE VISION: 0
PHOTOPHOBIA: 0
NAUSEA: 0
HEMOPTYSIS: 0
FEVER: 0
PALPITATIONS: 0
HEADACHES: 0
POLYDIPSIA: 0
BRUISES/BLEEDS EASILY: 0
TINGLING: 0
BACK PAIN: 1
HALLUCINATIONS: 0
VOMITING: 0
MYALGIAS: 0
WEIGHT LOSS: 0
HEARTBURN: 0

## 2018-11-08 ASSESSMENT — PAIN SCALES - GENERAL
PAINLEVEL_OUTOF10: 7
PAINLEVEL_OUTOF10: 7
PAINLEVEL_OUTOF10: 4

## 2018-11-08 ASSESSMENT — PATIENT HEALTH QUESTIONNAIRE - PHQ9
9. THOUGHTS THAT YOU WOULD BE BETTER OFF DEAD, OR OF HURTING YOURSELF: NEARLY EVERY DAY
5. POOR APPETITE OR OVEREATING: NEARLY EVERY DAY
2. FEELING DOWN, DEPRESSED, IRRITABLE, OR HOPELESS: NEARLY EVERY DAY
6. FEELING BAD ABOUT YOURSELF - OR THAT YOU ARE A FAILURE OR HAVE LET YOURSELF OR YOUR FAMILY DOWN: NEARLY EVERY DAY
4. FEELING TIRED OR HAVING LITTLE ENERGY: NEARLY EVERY DAY
8. MOVING OR SPEAKING SO SLOWLY THAT OTHER PEOPLE COULD HAVE NOTICED. OR THE OPPOSITE, BEING SO FIGETY OR RESTLESS THAT YOU HAVE BEEN MOVING AROUND A LOT MORE THAN USUAL: NEARLY EVERY DAY
1. LITTLE INTEREST OR PLEASURE IN DOING THINGS: NEARLY EVERY DAY
SUM OF ALL RESPONSES TO PHQ QUESTIONS 1-9: 27
3. TROUBLE FALLING OR STAYING ASLEEP OR SLEEPING TOO MUCH: NEARLY EVERY DAY
SUM OF ALL RESPONSES TO PHQ9 QUESTIONS 1 AND 2: 6
7. TROUBLE CONCENTRATING ON THINGS, SUCH AS READING THE NEWSPAPER OR WATCHING TELEVISION: NEARLY EVERY DAY

## 2018-11-08 ASSESSMENT — LIFESTYLE VARIABLES: SUBSTANCE_ABUSE: 0

## 2018-11-08 NOTE — ED TRIAGE NOTES
Chief Complaint   Patient presents with   • Chest Pain     pt bib remsa from [place] on legal hold. c/o left side chest pain radiated to left shoulder 4-5 minutes while walking back to room after eating breakfast.     Describes pain as pressure and intermittent. Was given nitro x 1 sl at [facility] w/relief. Arrived NAD. Skin pwd. Able to speak in full sentences. Has hx of MI and 3 stents.

## 2018-11-08 NOTE — ED PROVIDER NOTES
ED Provider Note    Scribed for Hanny Love M.D. by Kory Hahn. 11/8/2018, 10:29 AM.    Primary care provider: Pcp Pt States None  Means of arrival: EMS  History obtained from: patient  History limited by: none    CHIEF COMPLAINT  Chief Complaint   Patient presents with   • Chest Pain     pt alberto aguilar from [place] on legal hold. c/o left side chest pain radiated to left shoulder 4-5 minutes while walking back to room after eating breakfast.       HPI  Rajiv Sam is a 57 y.o. male who presents to the Emergency Department as a transfer from [place] complaining of suddenly worsening left sided chest pain starting this morning. Patient describes his pain as constnatn that radiated to his left shoulder. He repots that his pain was suddenly exacerbated after walking around this morning. Patient was treated with nitroglycerin prior to transport which he states improved his pain. He states that his pain is improved at this time. Patient reports a history of similar episode of increased pain once a week for the last 16 months. Dr. Caban is his cardiologist in [place]. He has a history of MI with 3 stents. Patient reports associated continued depression secondary to his chronic health problems. Patient denies abdominal pain, vomiting, diaphoresis.      REVIEW OF SYSTEMS  Pertinent positives include chest pain, depression. Pertinent negatives include no abdominal pain, vomiting, diaphoresis.  All other systems reviewed and negative.    PAST MEDICAL HISTORY   has a past medical history of Hypertension; Old MI (myocardial infarction); Psychiatric disorder; and PTSD (post-traumatic stress disorder).    SURGICAL HISTORY  patient denies any surgical history    SOCIAL HISTORY  Social History   Substance Use Topics   • Smoking status: Current Every Day Smoker   • Smokeless tobacco: Never Used   • Alcohol use No      History   Drug Use   • Types: Inhaled     Comment: pot       FAMILY HISTORY  No family history of MI.      CURRENT MEDICATIONS  Home Medications     Reviewed by Bel Gamboa (Pharmacy [facility]) on 11/08/18 at 1125  Med List Status: Complete   Medication Last Dose Status   aspirin EC (ECOTRIN) 81 MG Tablet Delayed Response 11/7/2018 Active   buPROPion SR (WELLBUTRIN-SR) 150 MG TABLET SR 12 HR sustained-release tablet 11/8/2018 Active   clopidogrel (PLAVIX) 75 MG Tab 11/7/2018 Active   gabapentin (NEURONTIN) 300 MG Cap 11/8/2018 Active   lisinopril (PRINIVIL) 20 MG Tab 11/8/2018 Active   mag hydrox-al hydrox-simeth (MAALOX PLUS ES OR MYLANTA DS) 400-400-40 MG/5ML Suspension 11/7/2018 Active   metformin (GLUCOPHAGE) 1000 MG tablet 11/7/2018 Active   metoprolol (LOPRESSOR) 25 MG Tab 11/8/2018 Active   nitroglycerin (NITROSTAT) 0.4 MG SL Tab 11/8/2018 Active   prazosin (MINIPRESS) 2 MG Cap 11/7/2018 Active   QUEtiapine (SEROQUEL) 200 MG Tab 11/7/2018 Active   simvastatin (ZOCOR) 20 MG Tab 11/7/2018 Active                ALLERGIES  Allergies   Allergen Reactions   • Pcn [Penicillins] Rash     Rash       PHYSICAL EXAM  VITAL SIGNS: /74   Pulse 83   Temp 36.3 °C (97.4 °F)   Resp 16   Wt 56.7 kg (125 lb)   SpO2 97%   BMI 21.46 kg/m²   Constitutional: Alert. Mild distress.  HENT: No signs of trauma, Bilateral external ears normal, Nose normal. Dry mucous membranes  Eyes: Pupils are equal and reactive, Conjunctiva normal, Non-icteric.   Neck: Normal range of motion, No tenderness, Supple, No stridor.   Cardiovascular: Regular rate and rhythm, no murmurs.   Thorax & Lungs: Normal breath sounds, No respiratory distress, No wheezing, No chest tenderness.   Abdomen: Bowel sounds normal, Soft, No tenderness, No masses, No peritoneal signs.  Skin: Warm, Dry, No erythema, No rash.   Musculoskeletal:  No major deformities noted.  Neurologic: Alert, moving all extremities without difficulty, no focal deficits.  Psychiatric: Anxious.     LABS  Labs Reviewed   CBC WITH DIFFERENTIAL - Abnormal; Notable for the following:         Result Value    WBC 11.5 (*)     RBC 3.94 (*)     Hemoglobin 12.4 (*)     Hematocrit 36.6 (*)     Neutrophils-Polys 78.90 (*)     Lymphocytes 9.80 (*)     Immature Granulocytes 1.00 (*)     Neutrophils (Absolute) 9.06 (*)     All other components within normal limits    Narrative:     Indicate which anticoagulants the patient is on:->UNKNOWN   COMP METABOLIC PANEL - Abnormal; Notable for the following:     Sodium 131 (*)     Anion Gap 12.0 (*)     Glucose 286 (*)     Bun 40 (*)     Creatinine 1.90 (*)     AST(SGOT) 10 (*)     All other components within normal limits    Narrative:     Indicate which anticoagulants the patient is on:->UNKNOWN   ESTIMATED GFR - Abnormal; Notable for the following:     GFR If  44 (*)     GFR If Non  37 (*)     All other components within normal limits    Narrative:     Indicate which anticoagulants the patient is on:->UNKNOWN   TROPONIN    Narrative:     Indicate which anticoagulants the patient is on:->UNKNOWN   BTYPE NATRIURETIC PEPTIDE    Narrative:     Indicate which anticoagulants the patient is on:->UNKNOWN   PROTHROMBIN TIME    Narrative:     Indicate which anticoagulants the patient is on:->UNKNOWN   APTT    Narrative:     Indicate which anticoagulants the patient is on:->UNKNOWN   LIPASE    Narrative:     Indicate which anticoagulants the patient is on:->UNKNOWN   All labs reviewed by me.    EKG  12 Lead EKG interpreted by me to show:  Normal sinus rhythm  Rate 81  Axis: Normal  Intervals: Incomplete right bundle branch block   Normal T waves  Normal ST segments  My impression of this EKG: Does not indicate ischemia or arrythmia at this time. Unchanged from previous EKG    RADIOLOGY  DX-CHEST-LIMITED (1 VIEW)   Final Result      No acute cardiopulmonary disease.      The radiologist's interpretation of all radiological studies have been reviewed by me.    COURSE & MEDICAL DECISION MAKING  Pertinent Labs & Imaging studies reviewed. (See  chart for details)    Differential diagnoses include but are not limited to: ACS, hypotension, dehydration    10:29 AM - Patient seen and examined at bedside. His legal hold has  at this time. He has a representative from [place] accompanying him and appears to be here voluntarily. I will not sign legal hold continuation at this time. Patient will be treated with  mg. Ordered DX chest, CBC with differential, CMP, BNP, Troponin, Lipase, PT/INR, APTT to evaluate his symptoms.     Review of past medical records shows the patient had a nuclear medicine stress test performed on 11/3/18.      11:30 AM - Patient's creatinine is elevated on labs and is hypotensive. He will be treated with NS 1000 ml for elevated creatinine.     12:13 PM - Patient is persistently hypotensive. He will be admitted. Patient will be treated with NS 1000 ml for continued hypotension.     12:23 PM - I discussed the patient's case and the above findings with Dr. Carson (hospitalist) who agrees to admit the patient.     1:00 PM - Patient had a positive response to IV fluids with improved blood pressure.     HYDRATION: Based on the patient's presentation of Hypotension the patient was given IV fluids. IV Hydration was used because oral hydration was not adequate alone. Upon recheck following hydration, the patient was improved.       Decision Making:  This is a 57 y.o. year old male who presents with chest pain.  He was also slightly hypotensive because of this he was given IV fluids.  On lab evaluation he has evidence of acute renal insufficiency possibly secondary to dehydration he was persistently hypotensive after IV fluids and therefore given additional IV fluids.  His troponin is negative and he recently had a negative cardiac stress test.  I am less concerned for his chest pain being related to ACS and am more concerned regarding his acute renal insufficiency and persistent hypotension therefore he will be admitted for  this.    DISPOSITION:  Patient will be admitted to hospital in guarded condition.    FINAL IMPRESSION  1. Acute renal failure, unspecified acute renal failure type (HCC)    2. Hypotension, unspecified hypotension type         This dictation has been created using voice recognition software and/or scribes. The accuracy of the dictation is limited by the abilities of the software and the expertise of the scribes. I expect there may be some errors of grammar and possibly content. I made every attempt to manually correct the errors within my dictation. However, errors related to voice recognition software and/or scribes may still exist and should be interpreted within the appropriate context.     Kory ELDRIDGE (Scribe), am scribing for, and in the presence of, Hanny Love M.D..    Electronically signed by: Kory Hahn (Scribe), 11/8/2018    Hanny ELDRIDGE M.D. personally performed the services described in this documentation, as scribed by Kory Hahn in my presence, and it is both accurate and complete. C    The note accurately reflects work and decisions made by me.  Hanny Love  11/8/2018  3:19 PM

## 2018-11-09 ENCOUNTER — APPOINTMENT (OUTPATIENT)
Dept: RADIOLOGY | Facility: REHABILITATION | Age: 58
End: 2018-11-09
Attending: INTERNAL MEDICINE
Payer: MEDICARE

## 2018-11-09 ENCOUNTER — APPOINTMENT (OUTPATIENT)
Dept: RADIOLOGY | Facility: MEDICAL CENTER | Age: 58
End: 2018-11-09
Attending: INTERNAL MEDICINE
Payer: MEDICARE

## 2018-11-09 VITALS
HEIGHT: 63 IN | WEIGHT: 125 LBS | RESPIRATION RATE: 15 BRPM | TEMPERATURE: 98.3 F | DIASTOLIC BLOOD PRESSURE: 85 MMHG | BODY MASS INDEX: 22.15 KG/M2 | OXYGEN SATURATION: 100 % | HEART RATE: 86 BPM | SYSTOLIC BLOOD PRESSURE: 125 MMHG

## 2018-11-09 PROBLEM — N17.9 ACUTE ON CHRONIC KIDNEY FAILURE (HCC): Status: RESOLVED | Noted: 2018-11-08 | Resolved: 2018-11-09

## 2018-11-09 PROBLEM — N18.9 ACUTE ON CHRONIC KIDNEY FAILURE (HCC): Status: RESOLVED | Noted: 2018-11-08 | Resolved: 2018-11-09

## 2018-11-09 PROBLEM — R07.9 CHEST PAIN: Status: RESOLVED | Noted: 2018-11-03 | Resolved: 2018-11-09

## 2018-11-09 PROBLEM — I95.9 HYPOTENSION: Status: RESOLVED | Noted: 2018-11-08 | Resolved: 2018-11-09

## 2018-11-09 LAB
ANION GAP SERPL CALC-SCNC: 7 MMOL/L (ref 0–11.9)
BASOPHILS # BLD AUTO: 0.5 % (ref 0–1.8)
BASOPHILS # BLD: 0.04 K/UL (ref 0–0.12)
BUN SERPL-MCNC: 19 MG/DL (ref 8–22)
CALCIUM SERPL-MCNC: 7.8 MG/DL (ref 8.5–10.5)
CHLORIDE SERPL-SCNC: 104 MMOL/L (ref 96–112)
CO2 SERPL-SCNC: 23 MMOL/L (ref 20–33)
CORTIS SERPL-MCNC: 11.8 UG/DL (ref 0–23)
CREAT SERPL-MCNC: 1.26 MG/DL (ref 0.5–1.4)
EOSINOPHIL # BLD AUTO: 0.64 K/UL (ref 0–0.51)
EOSINOPHIL NFR BLD: 7.6 % (ref 0–6.9)
ERYTHROCYTE [DISTWIDTH] IN BLOOD BY AUTOMATED COUNT: 49.3 FL (ref 35.9–50)
GLUCOSE BLD-MCNC: 198 MG/DL (ref 65–99)
GLUCOSE BLD-MCNC: 212 MG/DL (ref 65–99)
GLUCOSE SERPL-MCNC: 198 MG/DL (ref 65–99)
HCT VFR BLD AUTO: 31 % (ref 42–52)
HGB BLD-MCNC: 10.2 G/DL (ref 14–18)
IMM GRANULOCYTES # BLD AUTO: 0.11 K/UL (ref 0–0.11)
IMM GRANULOCYTES NFR BLD AUTO: 1.3 % (ref 0–0.9)
LYMPHOCYTES # BLD AUTO: 1.98 K/UL (ref 1–4.8)
LYMPHOCYTES NFR BLD: 23.6 % (ref 22–41)
MCH RBC QN AUTO: 31.1 PG (ref 27–33)
MCHC RBC AUTO-ENTMCNC: 32.9 G/DL (ref 33.7–35.3)
MCV RBC AUTO: 94.5 FL (ref 81.4–97.8)
MONOCYTES # BLD AUTO: 0.84 K/UL (ref 0–0.85)
MONOCYTES NFR BLD AUTO: 10 % (ref 0–13.4)
NEUTROPHILS # BLD AUTO: 4.78 K/UL (ref 1.82–7.42)
NEUTROPHILS NFR BLD: 57 % (ref 44–72)
NRBC # BLD AUTO: 0 K/UL
NRBC BLD-RTO: 0 /100 WBC
PLATELET # BLD AUTO: 313 K/UL (ref 164–446)
PMV BLD AUTO: 9 FL (ref 9–12.9)
POTASSIUM SERPL-SCNC: 3.6 MMOL/L (ref 3.6–5.5)
RBC # BLD AUTO: 3.28 M/UL (ref 4.7–6.1)
SODIUM SERPL-SCNC: 134 MMOL/L (ref 135–145)
WBC # BLD AUTO: 8.4 K/UL (ref 4.8–10.8)

## 2018-11-09 PROCEDURE — 700105 HCHG RX REV CODE 258: Performed by: INTERNAL MEDICINE

## 2018-11-09 PROCEDURE — G0378 HOSPITAL OBSERVATION PER HR: HCPCS

## 2018-11-09 PROCEDURE — 700101 HCHG RX REV CODE 250: Performed by: INTERNAL MEDICINE

## 2018-11-09 PROCEDURE — 36415 COLL VENOUS BLD VENIPUNCTURE: CPT

## 2018-11-09 PROCEDURE — 80048 BASIC METABOLIC PNL TOTAL CA: CPT

## 2018-11-09 PROCEDURE — 700111 HCHG RX REV CODE 636 W/ 250 OVERRIDE (IP): Performed by: INTERNAL MEDICINE

## 2018-11-09 PROCEDURE — 96372 THER/PROPH/DIAG INJ SC/IM: CPT

## 2018-11-09 PROCEDURE — 85025 COMPLETE CBC W/AUTO DIFF WBC: CPT

## 2018-11-09 PROCEDURE — 82962 GLUCOSE BLOOD TEST: CPT | Mod: 91

## 2018-11-09 PROCEDURE — 99217 PR OBSERVATION CARE DISCHARGE: CPT | Performed by: HOSPITALIST

## 2018-11-09 PROCEDURE — 82533 TOTAL CORTISOL: CPT

## 2018-11-09 PROCEDURE — A9270 NON-COVERED ITEM OR SERVICE: HCPCS | Performed by: INTERNAL MEDICINE

## 2018-11-09 PROCEDURE — 700102 HCHG RX REV CODE 250 W/ 637 OVERRIDE(OP): Performed by: INTERNAL MEDICINE

## 2018-11-09 PROCEDURE — 700102 HCHG RX REV CODE 250 W/ 637 OVERRIDE(OP): Performed by: PSYCHIATRY & NEUROLOGY

## 2018-11-09 PROCEDURE — A9270 NON-COVERED ITEM OR SERVICE: HCPCS | Performed by: PSYCHIATRY & NEUROLOGY

## 2018-11-09 RX ORDER — ACETAMINOPHEN 325 MG/1
650 TABLET ORAL EVERY 8 HOURS PRN
Status: DISCONTINUED | OUTPATIENT
Start: 2018-11-09 | End: 2018-11-09 | Stop reason: HOSPADM

## 2018-11-09 RX ADMIN — HEPARIN SODIUM 5000 UNITS: 5000 INJECTION, SOLUTION INTRAVENOUS; SUBCUTANEOUS at 05:28

## 2018-11-09 RX ADMIN — INSULIN HUMAN 1 UNITS: 100 INJECTION, SOLUTION PARENTERAL at 13:01

## 2018-11-09 RX ADMIN — SODIUM CHLORIDE: 9 INJECTION, SOLUTION INTRAVENOUS at 00:28

## 2018-11-09 RX ADMIN — ACETAMINOPHEN 1000 MG: 500 TABLET ORAL at 12:54

## 2018-11-09 RX ADMIN — INSULIN HUMAN 1 UNITS: 100 INJECTION, SOLUTION PARENTERAL at 05:35

## 2018-11-09 RX ADMIN — LIDOCAINE 1 PATCH: 50 PATCH TOPICAL at 05:29

## 2018-11-09 RX ADMIN — STANDARDIZED SENNA CONCENTRATE AND DOCUSATE SODIUM 2 TABLET: 8.6; 5 TABLET, FILM COATED ORAL at 05:28

## 2018-11-09 RX ADMIN — HEPARIN SODIUM 5000 UNITS: 5000 INJECTION, SOLUTION INTRAVENOUS; SUBCUTANEOUS at 12:54

## 2018-11-09 RX ADMIN — SIMVASTATIN 20 MG: 20 TABLET, FILM COATED ORAL at 17:46

## 2018-11-09 RX ADMIN — OXYCODONE HYDROCHLORIDE 10 MG: 10 TABLET ORAL at 00:27

## 2018-11-09 RX ADMIN — BUPROPION HYDROCHLORIDE 150 MG: 150 TABLET, EXTENDED RELEASE ORAL at 05:28

## 2018-11-09 RX ADMIN — ASPIRIN 81 MG: 81 TABLET, COATED ORAL at 06:00

## 2018-11-09 RX ADMIN — GABAPENTIN 300 MG: 300 CAPSULE ORAL at 17:46

## 2018-11-09 RX ADMIN — ACETAMINOPHEN 1000 MG: 500 TABLET ORAL at 05:27

## 2018-11-09 RX ADMIN — QUETIAPINE FUMARATE 300 MG: 100 TABLET ORAL at 17:46

## 2018-11-09 RX ADMIN — GABAPENTIN 300 MG: 300 CAPSULE ORAL at 12:54

## 2018-11-09 RX ADMIN — QUETIAPINE FUMARATE 200 MG: 100 TABLET ORAL at 05:28

## 2018-11-09 RX ADMIN — BUPROPION HYDROCHLORIDE 150 MG: 150 TABLET, EXTENDED RELEASE ORAL at 17:46

## 2018-11-09 RX ADMIN — GABAPENTIN 300 MG: 300 CAPSULE ORAL at 05:28

## 2018-11-09 RX ADMIN — CLOPIDOGREL 75 MG: 75 TABLET, FILM COATED ORAL at 05:28

## 2018-11-09 RX ADMIN — OXYCODONE HYDROCHLORIDE 10 MG: 10 TABLET ORAL at 03:40

## 2018-11-09 ASSESSMENT — PAIN SCALES - GENERAL
PAINLEVEL_OUTOF10: 5
PAINLEVEL_OUTOF10: 8
PAINLEVEL_OUTOF10: 7

## 2018-11-09 NOTE — PSYCHIATRY
BRIEF PSYCHIATRIC CONSULT NOTE: patient seen, full note to follow.  -Legal Hold: pt is likely on a legal hold from [facility]; will f/u with vilma tomorrow. He is willing to stay regardless. 2      -Sitter:yes  -Restrictions:   -phone:yes   -visitors:no   -personal items: no   -finger foods required: no    -personal clothes: no but may have underwear   -Orders Placed: routine  -Plan:continue to follow     Increasing seroquel to 200qam and 300mg qpm   Continue wellbutrin.     Thank you for the consult

## 2018-11-09 NOTE — DISCHARGE PLANNING
Anticipated Discharge Disposition:   [facility]      Action:   Received a call from Maryan ESTRADA Hospitalist who notified  that pt is medically clear to return to [facility] today. LVM for [facility] to follow up with [facility]. Transport form completed and faxed to [facility].  REMSA form completed and faxed to [facility].    Barriers to Discharge:   Pending confirmation that [facility] can accept pt today    Plan:   Follow up with [facility]  Update MD.

## 2018-11-09 NOTE — DISCHARGE INSTRUCTIONS
Discharge Instructions    Discharged to other by medical transportation with escort. Discharged via wheelchair, hospital escort: Yes.  Special equipment needed: Not Applicable    Be sure to schedule a follow-up appointment with your primary care doctor or any specialists as instructed.     Discharge Plan:   Diet Plan: Discussed  Activity Level: Discussed  Confirmed Follow up Appointment: Patient to Call and Schedule Appointment  Confirmed Symptoms Management: Discussed  Medication Reconciliation Updated: Yes    I understand that a diet low in cholesterol, fat, and sodium is recommended for good health. Unless I have been given specific instructions below for another diet, I accept this instruction as my diet prescription.   Other diet: Regular     Special Instructions: None    · Is patient discharged on Warfarin / Coumadin?   No       Depression / Suicide Risk    As you are discharged from this [facility] facility, it is important to learn how to keep safe from harming yourself.    Recognize the warning signs:  · Abrupt changes in personality, positive or negative- including increase in energy   · Giving away possessions  · Change in eating patterns- significant weight changes-  positive or negative  · Change in sleeping patterns- unable to sleep or sleeping all the time   · Unwillingness or inability to communicate  · Depression  · Unusual sadness, discouragement and loneliness  · Talk of wanting to die  · Neglect of personal appearance   · Rebelliousness- reckless behavior  · Withdrawal from people/activities they love  · Confusion- inability to concentrate     If you or a loved one observes any of these behaviors or has concerns about self-harm, here's what you can do:  · Talk about it- your feelings and reasons for harming yourself  · Remove any means that you might use to hurt yourself (examples: pills, rope, extension cords, firearm)  · Get professional help from the community (Mental Health, Substance  Abuse, psychological counseling)  · Do not be alone:Call your Safe Contact- someone whom you trust who will be there for you.  · Call your local CRISIS HOTLINE 752-0448 or 387-929-3749  · Call your local Children's Mobile Crisis Response Team [place] (658) 626-5984 or www.Patient Feed  · Call the toll free National Suicide Prevention Hotlines   · National Suicide Prevention Lifeline 884-229-BSZA (8714)  · National Hope Line Network 800-SUICIDE (814-9163)

## 2018-11-09 NOTE — DISCHARGE SUMMARY
Discharge Summary    CHIEF COMPLAINT ON ADMISSION  Chief Complaint   Patient presents with   • Chest Pain     pt bib nachosa from [facility] on legal hold. c/o left side chest pain radiated to left shoulder 4-5 minutes while walking back to room after eating breakfast.       Reason for Admission  EMS      CODE STATUS  Full Code    HPI & HOSPITAL COURSE  This is a 57 y.o. male here with chest pain. He was transferred from [facility]. On evaluation his chest pain was felt to be non-cardiac, with stable EKG changes from prior as well as negative troponins. He was noted to have chronic chest pain. He required no further hospital workup for this and we recommend followup with Dr. Caban, his cardiologist, after discharge from [facility]. His medications are continued.        Therefore, he is discharged in fair and stable condition to a psychiatric hospital.        DISCHARGE DIAGNOSES  Active Problems:    Suicidal ideation POA: Yes    Type 2 diabetes mellitus with hyperglycemia, without long-term current use of insulin (HCC) POA: Yes    Psychiatric disorder POA: Yes  Resolved Problems:    Hypotension POA: Yes    Acute on chronic kidney failure (HCC) POA: Yes    Chest pain POA: Yes      FOLLOW UP  Future Appointments  Date Time Provider Department Center   11/14/2018 9:20 AM J[facility] None     No follow-up provider specified.    MEDICATIONS ON DISCHARGE     Medication List      CONTINUE taking these medications      Instructions   aspirin EC 81 MG Tbec  Commonly known as:  ECOTRIN   Take 81 mg by mouth every day.  Dose:  81 mg     buPROPion  MG Tb12 sustained-release tablet  Commonly known as:  WELLBUTRIN-SR   Take 150 mg by mouth 2 times a day. 0900,1400  Dose:  150 mg     clopidogrel 75 MG Tabs  Commonly known as:  PLAVIX   Take 75 mg by mouth every day.  Dose:  75 mg     gabapentin 300 MG Caps  Commonly known as:  NEURONTIN   Take 1 Cap by mouth 3 times a day.  Dose:  300 mg     lisinopril 20 MG  Tabs  Commonly known as:  PRINIVIL   Take 20 mg by mouth every day.  Dose:  20 mg     metformin 1000 MG tablet  Commonly known as:  GLUCOPHAGE   Take 1,000 mg by mouth 2 times a day, with meals.  Dose:  1000 mg     metoprolol 25 MG Tabs  Commonly known as:  LOPRESSOR   Take 25 mg by mouth 2 times a day.  Dose:  25 mg     QUEtiapine 200 MG Tabs  Commonly known as:  SEROQUEL   Take 1 Tab by mouth 2 Times a Day.  Dose:  200 mg     simvastatin 20 MG Tabs  Commonly known as:  ZOCOR   Take 1 Tab by mouth every evening.  Dose:  20 mg        STOP taking these medications    mag hydrox-al hydrox-simeth 400-400-40 MG/5ML Susp  Commonly known as:  MAALOX PLUS ES or MYLANTA DS     nitroglycerin 0.4 MG Subl  Commonly known as:  NITROSTAT     prazosin 2 MG Caps  Commonly known as:  MINIPRESS            Allergies  Allergies   Allergen Reactions   • Pcn [Penicillins] Rash     Rash       DIET  Orders Placed This Encounter   Procedures   • Diet Order Regular (No sharp utensils )     Standing Status:   Standing     Number of Occurrences:   1     Order Specific Question:   Diet:     Answer:   Regular [1]     Comments:   No sharp utensils        ACTIVITY  As tolerated.  Weight bearing as tolerated    LINES, DRAINS, AND WOUNDS  This is an automated list. Peripheral IVs will be removed prior to discharge.  Peripheral IV 11/08/18 20 G Right;Anterior Antecubital (Active)   Site Assessment Clean;Dry;Intact 11/8/2018  9:05 PM   Dressing Type Transparent 11/8/2018  9:05 PM   Line Status Infusing 11/8/2018  9:05 PM   Dressing Status Clean;Dry;Intact 11/8/2018  9:05 PM   Dressing Intervention N/A 11/8/2018  9:05 PM   Date Primary Tubing Changed 11/08/18 11/8/2018  4:39 PM   NEXT Primary Tubing Change  11/10/18 11/8/2018  4:39 PM   Infiltration Grading ([facility], [facility]) 0 11/8/2018  9:05 PM   Phlebitis Scale ([facility] Only) 0 11/8/2018  9:05 PM          Peripheral IV 11/08/18 20 G Right;Anterior Antecubital (Active)   Site Assessment Clean;Dry;Intact  11/8/2018  9:05 PM   Dressing Type Transparent 11/8/2018  9:05 PM   Line Status Infusing 11/8/2018  9:05 PM   Dressing Status Clean;Dry;Intact 11/8/2018  9:05 PM   Dressing Intervention N/A 11/8/2018  9:05 PM   Date Primary Tubing Changed 11/08/18 11/8/2018  4:39 PM   NEXT Primary Tubing Change  11/10/18 11/8/2018  4:39 PM   Infiltration Grading ([facility], [facility]) 0 11/8/2018  9:05 PM   Phlebitis Scale ([facility] Only) 0 11/8/2018  9:05 PM               MENTAL STATUS ON TRANSFER  Level of Consciousness: Alert  Orientation : Oriented x 4  Speech: Speech Clear    CONSULTATIONS  Crissy Watt MD - psychiatry    PROCEDURES  None    LABORATORY  Lab Results   Component Value Date    SODIUM 134 (L) 11/09/2018    POTASSIUM 3.6 11/09/2018    CHLORIDE 104 11/09/2018    CO2 23 11/09/2018    GLUCOSE 198 (H) 11/09/2018    BUN 19 11/09/2018    CREATININE 1.26 11/09/2018        Lab Results   Component Value Date    WBC 8.4 11/09/2018    HEMOGLOBIN 10.2 (L) 11/09/2018    HEMATOCRIT 31.0 (L) 11/09/2018    PLATELETCT 313 11/09/2018        Total time of the discharge process exceeds 30 minutes.

## 2018-11-09 NOTE — ASSESSMENT & PLAN NOTE
Patient to be admitted for close monitoring with suicide precautions and 1:1 sitter  Message for psychiatry service left for a consult

## 2018-11-09 NOTE — ASSESSMENT & PLAN NOTE
Most likely prerenal.  We will check UA, urine electrolytes, bladder scan, kidney ultrasound  We will provide patient with IV hydration and will monitor kidney function closely.

## 2018-11-09 NOTE — H&P
Hospital Medicine History & Physical Note    Date of Service  2018    Primary Care Physician  Pcp Pt States None    Consultants  Psychiatry will consult    Code Status  Full code    Chief Complaint  Chest pain    History of Presenting Illness  57 y.o. male history of hypertension, CAD and myocardial infarction, status post stents x3,, psychiatric disorder and PTSD, who presented 2018 with complaints of left-sided chest pain.  According the patient, chest pain is chronic at this point, has been going on in the last 3 months..  It is in the left side of the chest, and single point, sharp, radiating to left arm, exacerbated by deep inspiration, up to 8 out of 10.  Nonexertional.  Patient was brought from [facility], where he was being observed on legal hold for suicidal ideations.  Legal hold has been  at this time.  Patient continues endorsing suicidal thoughts, however he does not have actual plan for suicide.  He states that his life is not worth to live all because of his medical problems.  Patient noted to be hypotensive in the emergency department his systolic blood pressure at high 80s.  He received 1 L of normal saline and is getting another liter.  He states that he has not been eating well due to poor appetite and has not been drinking fluids.  He denies nausea vomiting or diarrhea.  Additionally creatinine noted to get up.  Blood sugar noted to be elevated at 286.  Patient noted to be recently discharged from this hospital on  after treatment of foot appears to be a pancreatitis.  Patient was transferred to [facility] due to suicidal ideations.  Today lipase is not elevated and he does not express complaints of epigastric pain itself to me..  Denies nausea or vomiting    Review of Systems  Review of Systems   Constitutional: Positive for malaise/fatigue. Negative for chills, fever and weight loss.   HENT: Negative for ear pain, hearing loss and tinnitus.    Eyes: Negative for blurred  vision, double vision and photophobia.   Respiratory: Negative for cough, hemoptysis and sputum production.    Cardiovascular: Positive for chest pain. Negative for palpitations and orthopnea.   Gastrointestinal: Negative for heartburn, nausea and vomiting.   Genitourinary: Negative for dysuria, frequency and urgency.   Musculoskeletal: Positive for back pain. Negative for joint pain, myalgias and neck pain.   Skin: Negative for itching and rash.   Neurological: Negative for dizziness, tingling and headaches.   Endo/Heme/Allergies: Negative for environmental allergies and polydipsia. Does not bruise/bleed easily.   Psychiatric/Behavioral: Positive for depression and suicidal ideas. Negative for hallucinations and substance abuse. The patient is not nervous/anxious.        Past Medical History   has a past medical history of Hypertension; Old MI (myocardial infarction); Psychiatric disorder; and PTSD (post-traumatic stress disorder).    Surgical History   has no past surgical history on file.     Family History  family history is not on file.     Social History   reports that he has been smoking.  He has never used smokeless tobacco. He reports that he uses drugs, including Inhaled. He reports that he does not drink alcohol.    Allergies  Allergies   Allergen Reactions   • Pcn [Penicillins] Rash     Rash       Medications  Prior to Admission Medications   Prescriptions Last Dose Informant Patient Reported? Taking?   QUEtiapine (SEROQUEL) 200 MG Tab 11/7/2018 at 2000 MAR from Other Facility No No   Sig: Take 1 Tab by mouth 2 Times a Day.   aspirin EC (ECOTRIN) 81 MG Tablet Delayed Response 11/7/2018 at 0900 MAR from Other Facility Yes No   Sig: Take 81 mg by mouth every day.   buPROPion SR (WELLBUTRIN-SR) 150 MG TABLET SR 12 HR sustained-release tablet 11/8/2018 at 0900 MAR from Other Facility Yes Yes   Sig: Take 150 mg by mouth 2 times a day. 0900,1400   clopidogrel (PLAVIX) 75 MG Tab 11/7/2018 at 0900 MAR from Other  Facility Yes Yes   Sig: Take 75 mg by mouth every day.   gabapentin (NEURONTIN) 300 MG Cap 11/8/2018 at 0900 MAR from Other Facility No No   Sig: Take 1 Cap by mouth 3 times a day.   lisinopril (PRINIVIL) 20 MG Tab 11/8/2018 at 0900 MAR from Other Facility Yes No   Sig: Take 20 mg by mouth every day.   mag hydrox-al hydrox-simeth (MAALOX PLUS ES OR MYLANTA DS) 400-400-40 MG/5ML Suspension 11/7/2018 at 1800 MAR from Other Facility Yes Yes   Sig: Take 30 mL by mouth 4 times a day as needed.   metformin (GLUCOPHAGE) 1000 MG tablet 11/7/2018 at 2000 MAR from Other Facility Yes Yes   Sig: Take 1,000 mg by mouth 2 times a day, with meals.   metoprolol (LOPRESSOR) 25 MG Tab 11/8/2018 at 0900 MAR from Other Facility Yes Yes   Sig: Take 25 mg by mouth 2 times a day.   nitroglycerin (NITROSTAT) 0.4 MG SL Tab 11/8/2018 at 0940 MAR from Other Facility Yes Yes   Sig: Place 0.4 mg under tongue every 5 minutes as needed for Chest Pain.   prazosin (MINIPRESS) 2 MG Cap 11/7/2018 at 2000 MAR from Other Facility Yes Yes   Sig: Take 2 mg by mouth every evening.   simvastatin (ZOCOR) 20 MG Tab 11/7/2018 at 2000 MAR from Other Facility No No   Sig: Take 1 Tab by mouth every evening.      Facility-Administered Medications: None       Physical Exam  Temp:  [36.3 °C (97.4 °F)] 36.3 °C (97.4 °F)  Pulse:  [72-86] 80  Resp:  [14-23] 23  BP: (107)/(74) 107/74    Physical Exam   Constitutional: He is oriented to person, place, and time.   HENT:   Head: Normocephalic and atraumatic.   Eyes: Pupils are equal, round, and reactive to light. EOM are normal.   Neck: Normal range of motion. Neck supple.   Cardiovascular: Normal rate and regular rhythm.    Pulmonary/Chest: Effort normal and breath sounds normal.   Tenderness to palpation in the left chest   Abdominal: Soft. Bowel sounds are normal.   Musculoskeletal: Normal range of motion.   Neurological: He is alert and oriented to person, place, and time.   Skin: Skin is warm and dry.   Psychiatric:  He exhibits a depressed mood. He expresses suicidal ideation.   Nursing note and vitals reviewed.      Laboratory:  Recent Labs      11/08/18   1029   WBC  11.5*   RBC  3.94*   HEMOGLOBIN  12.4*   HEMATOCRIT  36.6*   MCV  92.9   MCH  31.5   MCHC  33.9   RDW  47.5   PLATELETCT  376   MPV  9.1     Recent Labs      11/08/18   1029   SODIUM  131*   POTASSIUM  4.3   CHLORIDE  96   CO2  23   GLUCOSE  286*   BUN  40*   CREATININE  1.90*   CALCIUM  9.1     Recent Labs      11/08/18   1029   ALTSGPT  20   ASTSGOT  10*   ALKPHOSPHAT  73   TBILIRUBIN  0.7   LIPASE  19   GLUCOSE  286*     Recent Labs      11/08/18   1029   APTT  31.8   INR  0.98     Recent Labs      11/08/18   1029   BNPBTYPENAT  18         Recent Labs      11/08/18   1029   TROPONINI  <0.01       Urinalysis:    No results found     Imaging:  DX-CHEST-LIMITED (1 VIEW)   Final Result      No acute cardiopulmonary disease.      US-RENAL    (Results Pending)         Assessment/Plan:  I anticipate this patient is appropriate for observation status at this time.    Acute on chronic kidney failure (HCC)   Assessment & Plan    Most likely prerenal.  We will check UA, urine electrolytes, bladder scan, kidney ultrasound  We will provide patient with IV hydration and will monitor kidney function closely.     Hypotension   Assessment & Plan    Likely secondary to poor oral intake, uncontrolled diabetes with hyperglycemia, polypharmacy.  Patient is on multiple hypertensive medication, including prazosin, metoprolol, lisinopril.  We will hold blood pressure medications.  Will check cortisol, TSH.  We will provide IV hydration.  Monitor     Suicidal ideation- (present on admission)   Assessment & Plan    Patient to be admitted for close monitoring with suicide precautions and 1:1 sitter  Message for psychiatry service left for a consult        Chest pain- (present on admission)   Assessment & Plan    This is chronic in the last 3 months, felt to be noncardiogenic, more  likely musculoskeletal.  Troponin is not elevated  EKG showed old inferior infarct  Troponin will be repeated.  I do not feel need for telemetry at this point.  Tylenol scheduled .  Oxycodone as needed with holding parameters.  We will avoid IV narcotics due to borderline hypotension     Psychiatric disorder   Assessment & Plan    Continue quetiapine, Wellbutrin.  Psychiatry consult requested       Type 2 diabetes mellitus with hyperglycemia, without long-term current use of insulin ([facility])   Assessment & Plan    Acute kidney injury.  We will start on insulin sliding scale.  A1c noted to be 7.9         VTE prophylaxis: Subcu heparin

## 2018-11-09 NOTE — DISCHARGE PLANNING
Received Transport Form @ 2:50 pm  Spoke to Tasha YOU    Transport is scheduled for 11/09/18 @ 4:00 pm going to [facility].

## 2018-11-09 NOTE — CARE PLAN
Problem: Knowledge Deficit  Goal: Knowledge of the prescribed therapeutic regimen will improve    Intervention: Discuss information regarding therpeutic regimen and document in education  Plan of care discussed with patient.       Problem: Pain Management  Goal: Pain level will decrease to patient's comfort goal  Pain is controlled with current pain management. Will continue to monitor for any side effects.

## 2018-11-09 NOTE — PROGRESS NOTES
Pt is A&Ox4. Denies any n/v or n/t. Pt states a sharp pain that originates in his back & radiates to left side of chest. Pt medicated appropriately per MAR. Pt updated on POC. All needs met at this time. Sitter at bedside. Pt is instructed to call when in need of assistance. Bed in lowest and locked position. Call light within reach. Bed alarm and hourly rounding in place.

## 2018-11-09 NOTE — ASSESSMENT & PLAN NOTE
This is chronic in the last 3 months, felt to be noncardiogenic, more likely musculoskeletal.  Troponin is not elevated  EKG showed old inferior infarct  Troponin will be repeated.  I do not feel need for telemetry at this point.  Tylenol scheduled .  Oxycodone as needed with holding parameters.  We will avoid IV narcotics due to borderline hypotension

## 2018-11-09 NOTE — PROGRESS NOTES
2 RN skin check completed. Pt has blisters & a healing skin tear under the left toe. Picture taken & uploaded to [facility]. No other skin breakdown noted.

## 2018-11-09 NOTE — ASSESSMENT & PLAN NOTE
Likely secondary to poor oral intake, uncontrolled diabetes with hyperglycemia, polypharmacy.  Patient is on multiple hypertensive medication, including prazosin, metoprolol, lisinopril.  We will hold blood pressure medications.  Will check cortisol, TSH.  We will provide IV hydration.  Monitor

## 2018-11-10 LAB — GLUCOSE BLD-MCNC: 162 MG/DL (ref 65–99)

## 2018-11-10 NOTE — DISCHARGE PLANNING
Discharge Summary  [facility] faxed.  Spoke with Braulio at [facility] they are ready to accept patient.  Patient to transfer to to [facility] today at 1800 via [facility].

## 2018-11-10 NOTE — PROGRESS NOTES
Discharge instructions provided and reviewed w/pt. All questions answered. Pt left to [facility] w/KENYATTA. Report given to Filiberto at [facility]. All belongings given to [facility].

## 2018-11-14 NOTE — PSYCHIATRY
"PSYCHIATRIC CONSULTATION:  Reason for admission:   Chest pain   Reason for consult: si   Requesting Physician: Alli     Legal status:  Appears legal hold  at [facility]     Chief Complaint:depression     HPI:     Pt is a 56 y/o man with bipolar disorder and PTSD, was on a legal hold for SI and transferred to [facility]. Previously seen her for CP and elevated lipase. He was brought back for worsening chest pain this morning. Hx MI with 3 stents. In last admission here we started him back on his seroquel and wellbutrin. He is doing better than he was a few days ago, in that he isn't tearful or labile. He states that he feels \"pretty much the same\" however, and still endorses si. He would like to transfer back to inpatient psychiatry on discharge from here. He is still very frustrated about his home situation and finances. He is still having thoughts about jumping off a building. Endorses auditory hallucinations making negative comments. Denies command hallucinations.       Psychiatric Review of Systems:current symptoms as reported by pt.  Depression:      Depressed mood , si, guilt, low energy, anhedonia   Johana:No signs or symptoms   Anxiety/Panic Attacks  Very anxious related to his social situation   PTSD symptom: no current reexperiencing sx except endorses nightmares.   Psychosis: +ah   Other:       Medical Review of Systems: as reported by pt. All systems reviewed. Only those found to be + are noted below. All others are negative.   Neurological:    TBIs: +   SZs: denies    Strokes: denies    Other:  Other medical symptoms:   Thyroid: denies    Diabetes: +insulin dependent    Cardiovascular disease: +CAD +stent     Psychiatric Examination: observed phenomenon:  Vitals:Temp:  [36.3 °C (97.4 °F)] 36.3 °C (97.4 °F)  Pulse:  [72-86] 80  Resp:  [14-23] 23  BP: (107)/(74) 107/74  Musculoskeletal: mild psychomotor retardation   Appearance:Grooming wnl   Thought Process: Logical and sequential, " goal-directed   Thought Content:   +ah  Speech: Nl tone, rate, and volume. Not pressured. Understandable.   Mood:          depressed  Affect:         Constricted   SI/HI:   +si with plan   Attention/Alertness:  Awake, alert     Memory:     Grossly intact.   Orientation:       Grossly intact.   Cognition:  Grossly intact.   Insight/Judgement into symptoms:  Fair   Neurological Testing:      Past Psychiatric Hx:     Previous hospitalization - most recently [facility]  Chart Review indicates hospitalization in [place] in 2016 - patient states he has had numerous other hospitalizations at [facility] and [place] as well, as well as in [place], [place]  Hx Suicide Attempts - most recently 1 month ago states his friend stopped him, becomes increasingly tearful on further questioning and unable to state details of attempt  Patient states he has taken Seroquel for several years with benefit, he states he takes Prazosin at bedtime as well to aid with nightmares    Family Psychiatric Hx:  Denies     Social Hx:  Social History     Social History   • Marital status: Single     Spouse name: N/A   • Number of children: N/A   • Years of education: N/A     Occupational History   • Not on file.     Social History Main Topics   • Smoking status: Current Every Day Smoker   • Smokeless tobacco: Never Used   • Alcohol use No   • Drug use: Yes     Types: Inhaled      Comment: pot   • Sexual activity: Not on file     Other Topics Concern   • Not on file     Social History Narrative   • No narrative on file     Homeless  Relies on [facility] monthly  Ex girlfriend was his only support, and that relationship just recently ended       Drug/Alcohol/Tobacco Hx:   Drugs: THC   Alcohol:denies    Tobacco: +    Medical Hx: labs, MARS, medications, etc were reviewed. Only those findings of potential interest to psychiatry are noted below:  Past Medical History:   Diagnosis Date   • Hypertension    • Old MI (myocardial infarction)    •  Psychiatric disorder     depression    • PTSD (post-traumatic stress disorder)        Allergies: Pcn [penicillins]    Medications:  No current facility-administered medications for this encounter.      Current Outpatient Prescriptions   Medication Sig Dispense Refill   • buPROPion SR (WELLBUTRIN-SR) 150 MG TABLET SR 12 HR sustained-release tablet Take 150 mg by mouth 2 times a day. 0900,1400     • metformin (GLUCOPHAGE) 1000 MG tablet Take 1,000 mg by mouth 2 times a day, with meals.     • clopidogrel (PLAVIX) 75 MG Tab Take 75 mg by mouth every day.     • metoprolol (LOPRESSOR) 25 MG Tab Take 25 mg by mouth 2 times a day.     • gabapentin (NEURONTIN) 300 MG Cap Take 1 Cap by mouth 3 times a day. 90 Cap    • QUEtiapine (SEROQUEL) 200 MG Tab Take 1 Tab by mouth 2 Times a Day. 60 Tab 3   • simvastatin (ZOCOR) 20 MG Tab Take 1 Tab by mouth every evening. 30 Tab 3   • aspirin EC (ECOTRIN) 81 MG Tablet Delayed Response Take 81 mg by mouth every day.     • lisinopril (PRINIVIL) 20 MG Tab Take 20 mg by mouth every day.         ASSESSMENT:   Bipolar Disorder, major depressive episode with mood congruent psychotic features       PLAN:  Legal status: pt is likely still on hold, recommend checking with Nydia tomorrow for paperwork. Regardless, he is willing to stay.     Increasing seroquel to 200mg qam and 300mg qhs.   Will follow     Thank you for the consult.

## 2018-12-28 ENCOUNTER — HOSPITAL ENCOUNTER (INPATIENT)
Facility: MEDICAL CENTER | Age: 58
LOS: 1 days | DRG: 247 | End: 2018-12-29
Attending: EMERGENCY MEDICINE | Admitting: HOSPITALIST
Payer: MEDICARE

## 2018-12-28 ENCOUNTER — APPOINTMENT (OUTPATIENT)
Dept: RADIOLOGY | Facility: MEDICAL CENTER | Age: 58
DRG: 247 | End: 2018-12-28
Attending: EMERGENCY MEDICINE
Payer: MEDICARE

## 2018-12-28 ENCOUNTER — APPOINTMENT (OUTPATIENT)
Dept: CARDIOLOGY | Facility: MEDICAL CENTER | Age: 58
DRG: 247 | End: 2018-12-28
Attending: INTERNAL MEDICINE
Payer: MEDICARE

## 2018-12-28 DIAGNOSIS — I21.3 ST ELEVATION MYOCARDIAL INFARCTION (STEMI), UNSPECIFIED ARTERY (HCC): ICD-10-CM

## 2018-12-28 LAB
ACT BLD: 290 SEC (ref 74–137)
ALBUMIN SERPL BCP-MCNC: 3.7 G/DL (ref 3.2–4.9)
ALBUMIN/GLOB SERPL: 1.4 G/DL
ALP SERPL-CCNC: 92 U/L (ref 30–99)
ALT SERPL-CCNC: 9 U/L (ref 2–50)
ANION GAP SERPL CALC-SCNC: 10 MMOL/L (ref 0–11.9)
APTT PPP: 30.8 SEC (ref 24.7–36)
AST SERPL-CCNC: 12 U/L (ref 12–45)
BASOPHILS # BLD AUTO: 1.1 % (ref 0–1.8)
BASOPHILS # BLD: 0.11 K/UL (ref 0–0.12)
BILIRUB SERPL-MCNC: 0.3 MG/DL (ref 0.1–1.5)
BNP SERPL-MCNC: 21 PG/ML (ref 0–100)
BUN SERPL-MCNC: 18 MG/DL (ref 8–22)
CALCIUM SERPL-MCNC: 8.7 MG/DL (ref 8.5–10.5)
CHLORIDE SERPL-SCNC: 98 MMOL/L (ref 96–112)
CO2 SERPL-SCNC: 23 MMOL/L (ref 20–33)
CREAT SERPL-MCNC: 1.5 MG/DL (ref 0.5–1.4)
EKG IMPRESSION: NORMAL
EKG IMPRESSION: NORMAL
EOSINOPHIL # BLD AUTO: 0.15 K/UL (ref 0–0.51)
EOSINOPHIL NFR BLD: 1.6 % (ref 0–6.9)
ERYTHROCYTE [DISTWIDTH] IN BLOOD BY AUTOMATED COUNT: 46.4 FL (ref 35.9–50)
GLOBULIN SER CALC-MCNC: 2.7 G/DL (ref 1.9–3.5)
GLUCOSE SERPL-MCNC: 275 MG/DL (ref 65–99)
HCT VFR BLD AUTO: 36.9 % (ref 42–52)
HGB BLD-MCNC: 12.3 G/DL (ref 14–18)
IMM GRANULOCYTES # BLD AUTO: 0.04 K/UL (ref 0–0.11)
IMM GRANULOCYTES NFR BLD AUTO: 0.4 % (ref 0–0.9)
INR PPP: 0.98 (ref 0.87–1.13)
LIPASE SERPL-CCNC: 250 U/L (ref 11–82)
LV EJECT FRACT  99904: 65
LV EJECT FRACT MOD 2C 99903: 61.74
LV EJECT FRACT MOD 4C 99902: 65.92
LV EJECT FRACT MOD BP 99901: 64.47
LYMPHOCYTES # BLD AUTO: 1.36 K/UL (ref 1–4.8)
LYMPHOCYTES NFR BLD: 14.1 % (ref 22–41)
MCH RBC QN AUTO: 29.8 PG (ref 27–33)
MCHC RBC AUTO-ENTMCNC: 33.3 G/DL (ref 33.7–35.3)
MCV RBC AUTO: 89.3 FL (ref 81.4–97.8)
MONOCYTES # BLD AUTO: 0.96 K/UL (ref 0–0.85)
MONOCYTES NFR BLD AUTO: 9.9 % (ref 0–13.4)
NEUTROPHILS # BLD AUTO: 7.05 K/UL (ref 1.82–7.42)
NEUTROPHILS NFR BLD: 72.9 % (ref 44–72)
NRBC # BLD AUTO: 0 K/UL
NRBC BLD-RTO: 0 /100 WBC
PLATELET # BLD AUTO: 526 K/UL (ref 164–446)
PMV BLD AUTO: 8.6 FL (ref 9–12.9)
POTASSIUM SERPL-SCNC: 4 MMOL/L (ref 3.6–5.5)
PROT SERPL-MCNC: 6.4 G/DL (ref 6–8.2)
PROTHROMBIN TIME: 13.1 SEC (ref 12–14.6)
RBC # BLD AUTO: 4.13 M/UL (ref 4.7–6.1)
SODIUM SERPL-SCNC: 131 MMOL/L (ref 135–145)
TROPONIN I SERPL-MCNC: 0.31 NG/ML (ref 0–0.04)
TROPONIN I SERPL-MCNC: 2.78 NG/ML (ref 0–0.04)
WBC # BLD AUTO: 9.7 K/UL (ref 4.8–10.8)

## 2018-12-28 PROCEDURE — C1874 STENT, COATED/COV W/DEL SYS: HCPCS

## 2018-12-28 PROCEDURE — 85025 COMPLETE CBC W/AUTO DIFF WBC: CPT

## 2018-12-28 PROCEDURE — C1725 CATH, TRANSLUMIN NON-LASER: HCPCS

## 2018-12-28 PROCEDURE — 92941 PRQ TRLML REVSC TOT OCCL AMI: CPT | Mod: RC | Performed by: INTERNAL MEDICINE

## 2018-12-28 PROCEDURE — A9270 NON-COVERED ITEM OR SERVICE: HCPCS | Performed by: HOSPITALIST

## 2018-12-28 PROCEDURE — 027034Z DILATION OF CORONARY ARTERY, ONE ARTERY WITH DRUG-ELUTING INTRALUMINAL DEVICE, PERCUTANEOUS APPROACH: ICD-10-PCS | Performed by: INTERNAL MEDICINE

## 2018-12-28 PROCEDURE — 700105 HCHG RX REV CODE 258: Performed by: INTERNAL MEDICINE

## 2018-12-28 PROCEDURE — 93458 L HRT ARTERY/VENTRICLE ANGIO: CPT | Mod: 26,59 | Performed by: INTERNAL MEDICINE

## 2018-12-28 PROCEDURE — 700102 HCHG RX REV CODE 250 W/ 637 OVERRIDE(OP): Performed by: HOSPITALIST

## 2018-12-28 PROCEDURE — 700101 HCHG RX REV CODE 250

## 2018-12-28 PROCEDURE — 99223 1ST HOSP IP/OBS HIGH 75: CPT | Performed by: INTERNAL MEDICINE

## 2018-12-28 PROCEDURE — 71045 X-RAY EXAM CHEST 1 VIEW: CPT

## 2018-12-28 PROCEDURE — 99152 MOD SED SAME PHYS/QHP 5/>YRS: CPT

## 2018-12-28 PROCEDURE — 85610 PROTHROMBIN TIME: CPT

## 2018-12-28 PROCEDURE — B2111ZZ FLUOROSCOPY OF MULTIPLE CORONARY ARTERIES USING LOW OSMOLAR CONTRAST: ICD-10-PCS | Performed by: INTERNAL MEDICINE

## 2018-12-28 PROCEDURE — 85730 THROMBOPLASTIN TIME PARTIAL: CPT

## 2018-12-28 PROCEDURE — 83880 ASSAY OF NATRIURETIC PEPTIDE: CPT

## 2018-12-28 PROCEDURE — B2151ZZ FLUOROSCOPY OF LEFT HEART USING LOW OSMOLAR CONTRAST: ICD-10-PCS | Performed by: INTERNAL MEDICINE

## 2018-12-28 PROCEDURE — 4A023N7 MEASUREMENT OF CARDIAC SAMPLING AND PRESSURE, LEFT HEART, PERCUTANEOUS APPROACH: ICD-10-PCS | Performed by: INTERNAL MEDICINE

## 2018-12-28 PROCEDURE — 83690 ASSAY OF LIPASE: CPT

## 2018-12-28 PROCEDURE — 99223 1ST HOSP IP/OBS HIGH 75: CPT | Performed by: HOSPITALIST

## 2018-12-28 PROCEDURE — 96374 THER/PROPH/DIAG INJ IV PUSH: CPT

## 2018-12-28 PROCEDURE — 360979 HCHG DIAGNOSTIC CATH

## 2018-12-28 PROCEDURE — 304952 HCHG R 2 PADS

## 2018-12-28 PROCEDURE — C1894 INTRO/SHEATH, NON-LASER: HCPCS

## 2018-12-28 PROCEDURE — 84484 ASSAY OF TROPONIN QUANT: CPT | Mod: 91

## 2018-12-28 PROCEDURE — 93306 TTE W/DOPPLER COMPLETE: CPT

## 2018-12-28 PROCEDURE — A9270 NON-COVERED ITEM OR SERVICE: HCPCS

## 2018-12-28 PROCEDURE — 93306 TTE W/DOPPLER COMPLETE: CPT | Mod: 26 | Performed by: INTERNAL MEDICINE

## 2018-12-28 PROCEDURE — 99232 SBSQ HOSP IP/OBS MODERATE 35: CPT | Performed by: INTERNAL MEDICINE

## 2018-12-28 PROCEDURE — 99291 CRITICAL CARE FIRST HOUR: CPT

## 2018-12-28 PROCEDURE — 85347 COAGULATION TIME ACTIVATED: CPT

## 2018-12-28 PROCEDURE — A9270 NON-COVERED ITEM OR SERVICE: HCPCS | Performed by: INTERNAL MEDICINE

## 2018-12-28 PROCEDURE — 700102 HCHG RX REV CODE 250 W/ 637 OVERRIDE(OP): Performed by: INTERNAL MEDICINE

## 2018-12-28 PROCEDURE — C1769 GUIDE WIRE: HCPCS

## 2018-12-28 PROCEDURE — 93005 ELECTROCARDIOGRAM TRACING: CPT | Performed by: INTERNAL MEDICINE

## 2018-12-28 PROCEDURE — C9606 PERC D-E COR REVASC W AMI S: HCPCS | Mod: RC

## 2018-12-28 PROCEDURE — 700111 HCHG RX REV CODE 636 W/ 250 OVERRIDE (IP)

## 2018-12-28 PROCEDURE — 307093 HCHG TR BAND RADIAL

## 2018-12-28 PROCEDURE — 99152 MOD SED SAME PHYS/QHP 5/>YRS: CPT | Performed by: INTERNAL MEDICINE

## 2018-12-28 PROCEDURE — 770020 HCHG ROOM/CARE - TELE (206)

## 2018-12-28 PROCEDURE — 93458 L HRT ARTERY/VENTRICLE ANGIO: CPT

## 2018-12-28 PROCEDURE — 80053 COMPREHEN METABOLIC PANEL: CPT

## 2018-12-28 PROCEDURE — 99291 CRITICAL CARE FIRST HOUR: CPT | Performed by: INTERNAL MEDICINE

## 2018-12-28 PROCEDURE — 700111 HCHG RX REV CODE 636 W/ 250 OVERRIDE (IP): Performed by: INTERNAL MEDICINE

## 2018-12-28 PROCEDURE — 93005 ELECTROCARDIOGRAM TRACING: CPT

## 2018-12-28 PROCEDURE — 93005 ELECTROCARDIOGRAM TRACING: CPT | Performed by: EMERGENCY MEDICINE

## 2018-12-28 PROCEDURE — C1887 CATHETER, GUIDING: HCPCS

## 2018-12-28 PROCEDURE — 96375 TX/PRO/DX INJ NEW DRUG ADDON: CPT

## 2018-12-28 PROCEDURE — 700102 HCHG RX REV CODE 250 W/ 637 OVERRIDE(OP)

## 2018-12-28 PROCEDURE — 93010 ELECTROCARDIOGRAM REPORT: CPT | Performed by: INTERNAL MEDICINE

## 2018-12-28 RX ORDER — ASPIRIN 300 MG/1
300 SUPPOSITORY RECTAL DAILY
Status: DISCONTINUED | OUTPATIENT
Start: 2018-12-28 | End: 2018-12-28

## 2018-12-28 RX ORDER — BISACODYL 10 MG
10 SUPPOSITORY, RECTAL RECTAL
Status: DISCONTINUED | OUTPATIENT
Start: 2018-12-28 | End: 2018-12-29 | Stop reason: HOSPADM

## 2018-12-28 RX ORDER — CLONIDINE HYDROCHLORIDE 0.1 MG/1
0.1 TABLET ORAL EVERY 6 HOURS PRN
Status: DISCONTINUED | OUTPATIENT
Start: 2018-12-28 | End: 2018-12-29 | Stop reason: HOSPADM

## 2018-12-28 RX ORDER — OMEPRAZOLE 20 MG/1
20 CAPSULE, DELAYED RELEASE ORAL DAILY
Status: DISCONTINUED | OUTPATIENT
Start: 2018-12-28 | End: 2018-12-29 | Stop reason: HOSPADM

## 2018-12-28 RX ORDER — NITROGLYCERIN 0.4 MG/1
0.4 TABLET SUBLINGUAL
Status: ON HOLD | COMMUNITY
End: 2018-12-29

## 2018-12-28 RX ORDER — VERAPAMIL HYDROCHLORIDE 2.5 MG/ML
INJECTION, SOLUTION INTRAVENOUS
Status: COMPLETED
Start: 2018-12-28 | End: 2018-12-28

## 2018-12-28 RX ORDER — ONDANSETRON 4 MG/1
4 TABLET, ORALLY DISINTEGRATING ORAL EVERY 4 HOURS PRN
Status: DISCONTINUED | OUTPATIENT
Start: 2018-12-28 | End: 2018-12-29 | Stop reason: HOSPADM

## 2018-12-28 RX ORDER — NITROGLYCERIN 0.4 MG/1
TABLET SUBLINGUAL
Status: DISPENSED
Start: 2018-12-28 | End: 2018-12-28

## 2018-12-28 RX ORDER — SODIUM CHLORIDE 9 MG/ML
INJECTION, SOLUTION INTRAVENOUS CONTINUOUS
Status: DISCONTINUED | OUTPATIENT
Start: 2018-12-28 | End: 2018-12-28

## 2018-12-28 RX ORDER — ATORVASTATIN CALCIUM 20 MG/1
20 TABLET, FILM COATED ORAL NIGHTLY
Status: ON HOLD | COMMUNITY
End: 2018-12-29

## 2018-12-28 RX ORDER — HYDROXYZINE PAMOATE 50 MG/1
50 CAPSULE ORAL EVERY 12 HOURS PRN
Status: DISCONTINUED | OUTPATIENT
Start: 2018-12-28 | End: 2018-12-29 | Stop reason: HOSPADM

## 2018-12-28 RX ORDER — NITROGLYCERIN 0.4 MG/1
0.4 TABLET SUBLINGUAL ONCE
Status: DISCONTINUED | OUTPATIENT
Start: 2018-12-28 | End: 2018-12-28

## 2018-12-28 RX ORDER — CLOPIDOGREL BISULFATE 75 MG/1
600 TABLET ORAL ONCE
Status: ACTIVE | OUTPATIENT
Start: 2018-12-28 | End: 2018-12-29

## 2018-12-28 RX ORDER — LIDOCAINE HYDROCHLORIDE 20 MG/ML
INJECTION, SOLUTION INFILTRATION; PERINEURAL
Status: COMPLETED
Start: 2018-12-28 | End: 2018-12-28

## 2018-12-28 RX ORDER — PROMETHAZINE HYDROCHLORIDE 25 MG/1
12.5-25 TABLET ORAL EVERY 4 HOURS PRN
Status: DISCONTINUED | OUTPATIENT
Start: 2018-12-28 | End: 2018-12-29 | Stop reason: HOSPADM

## 2018-12-28 RX ORDER — ASPIRIN 325 MG
325 TABLET ORAL DAILY
Status: DISCONTINUED | OUTPATIENT
Start: 2018-12-28 | End: 2018-12-28

## 2018-12-28 RX ORDER — HYDROXYZINE HYDROCHLORIDE 25 MG/1
25-50 TABLET, FILM COATED ORAL 4 TIMES DAILY
COMMUNITY
End: 2019-02-13 | Stop reason: CLARIF

## 2018-12-28 RX ORDER — ATORVASTATIN CALCIUM 20 MG/1
80 TABLET, FILM COATED ORAL EVERY EVENING
Status: DISCONTINUED | OUTPATIENT
Start: 2018-12-28 | End: 2018-12-29 | Stop reason: HOSPADM

## 2018-12-28 RX ORDER — LISINOPRIL 20 MG/1
20 TABLET ORAL DAILY
Status: DISCONTINUED | OUTPATIENT
Start: 2018-12-28 | End: 2018-12-28

## 2018-12-28 RX ORDER — BUPROPION HYDROCHLORIDE 150 MG/1
150 TABLET, EXTENDED RELEASE ORAL 2 TIMES DAILY
Status: DISCONTINUED | OUTPATIENT
Start: 2018-12-28 | End: 2018-12-29 | Stop reason: HOSPADM

## 2018-12-28 RX ORDER — MIDAZOLAM HYDROCHLORIDE 1 MG/ML
INJECTION INTRAMUSCULAR; INTRAVENOUS
Status: COMPLETED
Start: 2018-12-28 | End: 2018-12-28

## 2018-12-28 RX ORDER — NITROGLYCERIN 0.4 MG/1
TABLET SUBLINGUAL
Status: COMPLETED
Start: 2018-12-28 | End: 2018-12-28

## 2018-12-28 RX ORDER — GABAPENTIN 300 MG/1
300 CAPSULE ORAL 3 TIMES DAILY
Status: DISCONTINUED | OUTPATIENT
Start: 2018-12-28 | End: 2018-12-29 | Stop reason: HOSPADM

## 2018-12-28 RX ORDER — PROMETHAZINE HYDROCHLORIDE 25 MG/1
12.5-25 SUPPOSITORY RECTAL EVERY 4 HOURS PRN
Status: DISCONTINUED | OUTPATIENT
Start: 2018-12-28 | End: 2018-12-29 | Stop reason: HOSPADM

## 2018-12-28 RX ORDER — CLOPIDOGREL 300 MG/1
TABLET, FILM COATED ORAL
Status: COMPLETED
Start: 2018-12-28 | End: 2018-12-28

## 2018-12-28 RX ORDER — MORPHINE SULFATE 4 MG/ML
INJECTION, SOLUTION INTRAMUSCULAR; INTRAVENOUS
Status: COMPLETED
Start: 2018-12-28 | End: 2018-12-28

## 2018-12-28 RX ORDER — HEPARIN SODIUM 1000 [USP'U]/ML
5000 INJECTION, SOLUTION INTRAVENOUS; SUBCUTANEOUS ONCE
Status: COMPLETED | OUTPATIENT
Start: 2018-12-28 | End: 2018-12-28

## 2018-12-28 RX ORDER — POLYETHYLENE GLYCOL 3350 17 G/17G
1 POWDER, FOR SOLUTION ORAL
Status: DISCONTINUED | OUTPATIENT
Start: 2018-12-28 | End: 2018-12-29 | Stop reason: HOSPADM

## 2018-12-28 RX ORDER — AMOXICILLIN 250 MG
2 CAPSULE ORAL 2 TIMES DAILY
Status: DISCONTINUED | OUTPATIENT
Start: 2018-12-28 | End: 2018-12-29 | Stop reason: HOSPADM

## 2018-12-28 RX ORDER — SIMVASTATIN 40 MG
20 TABLET ORAL EVERY EVENING
Status: DISCONTINUED | OUTPATIENT
Start: 2018-12-28 | End: 2018-12-28

## 2018-12-28 RX ORDER — SODIUM CHLORIDE 9 MG/ML
INJECTION, SOLUTION INTRAVENOUS CONTINUOUS
Status: DISCONTINUED | OUTPATIENT
Start: 2018-12-28 | End: 2018-12-29

## 2018-12-28 RX ORDER — HEPARIN SODIUM,PORCINE 1000/ML
VIAL (ML) INJECTION
Status: COMPLETED
Start: 2018-12-28 | End: 2018-12-28

## 2018-12-28 RX ORDER — ACETAMINOPHEN 325 MG/1
650 TABLET ORAL EVERY 6 HOURS PRN
Status: DISCONTINUED | OUTPATIENT
Start: 2018-12-28 | End: 2018-12-29 | Stop reason: HOSPADM

## 2018-12-28 RX ORDER — ONDANSETRON 2 MG/ML
4 INJECTION INTRAMUSCULAR; INTRAVENOUS EVERY 4 HOURS PRN
Status: DISCONTINUED | OUTPATIENT
Start: 2018-12-28 | End: 2018-12-29 | Stop reason: HOSPADM

## 2018-12-28 RX ORDER — MORPHINE SULFATE 4 MG/ML
2-4 INJECTION, SOLUTION INTRAMUSCULAR; INTRAVENOUS
Status: DISCONTINUED | OUTPATIENT
Start: 2018-12-28 | End: 2018-12-28

## 2018-12-28 RX ORDER — ASPIRIN 81 MG/1
324 TABLET, CHEWABLE ORAL DAILY
Status: DISCONTINUED | OUTPATIENT
Start: 2018-12-28 | End: 2018-12-28

## 2018-12-28 RX ORDER — CLOPIDOGREL BISULFATE 75 MG/1
75 TABLET ORAL DAILY
Status: DISCONTINUED | OUTPATIENT
Start: 2018-12-28 | End: 2018-12-29 | Stop reason: HOSPADM

## 2018-12-28 RX ORDER — PRAZOSIN HYDROCHLORIDE 1 MG/1
1 CAPSULE ORAL NIGHTLY
Status: ON HOLD | COMMUNITY
End: 2018-12-29

## 2018-12-28 RX ORDER — MORPHINE SULFATE 4 MG/ML
2 INJECTION, SOLUTION INTRAMUSCULAR; INTRAVENOUS ONCE
Status: COMPLETED | OUTPATIENT
Start: 2018-12-28 | End: 2018-12-28

## 2018-12-28 RX ORDER — QUETIAPINE FUMARATE 100 MG/1
200 TABLET, FILM COATED ORAL 2 TIMES DAILY
Status: DISCONTINUED | OUTPATIENT
Start: 2018-12-28 | End: 2018-12-29 | Stop reason: HOSPADM

## 2018-12-28 RX ADMIN — LISINOPRIL 20 MG: 20 TABLET ORAL at 05:45

## 2018-12-28 RX ADMIN — HYDROXYZINE PAMOATE 50 MG: 50 CAPSULE ORAL at 13:51

## 2018-12-28 RX ADMIN — QUETIAPINE FUMARATE 200 MG: 100 TABLET ORAL at 05:45

## 2018-12-28 RX ADMIN — SODIUM CHLORIDE: 9 INJECTION, SOLUTION INTRAVENOUS at 02:56

## 2018-12-28 RX ADMIN — HEPARIN SODIUM 5000 UNITS: 1000 INJECTION, SOLUTION INTRAVENOUS; SUBCUTANEOUS at 01:36

## 2018-12-28 RX ADMIN — CLOPIDOGREL BISULFATE 600 MG: 300 TABLET, FILM COATED ORAL at 02:15

## 2018-12-28 RX ADMIN — NITROGLYCERIN: 20 INJECTION INTRAVENOUS at 01:45

## 2018-12-28 RX ADMIN — FENTANYL CITRATE 100 MCG: 50 INJECTION, SOLUTION INTRAMUSCULAR; INTRAVENOUS at 02:12

## 2018-12-28 RX ADMIN — NITROGLYCERIN 0.4 MG: 0.4 TABLET SUBLINGUAL at 01:26

## 2018-12-28 RX ADMIN — HEPARIN SODIUM: 200 INJECTION, SOLUTION INTRAVENOUS at 01:45

## 2018-12-28 RX ADMIN — METOPROLOL TARTRATE 25 MG: 25 TABLET, FILM COATED ORAL at 02:51

## 2018-12-28 RX ADMIN — SODIUM CHLORIDE: 9 INJECTION, SOLUTION INTRAVENOUS at 11:46

## 2018-12-28 RX ADMIN — MORPHINE SULFATE 2 MG: 4 INJECTION INTRAVENOUS at 01:45

## 2018-12-28 RX ADMIN — MORPHINE SULFATE 2 MG: 4 INJECTION, SOLUTION INTRAMUSCULAR; INTRAVENOUS at 01:45

## 2018-12-28 RX ADMIN — LIDOCAINE HYDROCHLORIDE: 20 INJECTION, SOLUTION INFILTRATION; PERINEURAL at 01:57

## 2018-12-28 RX ADMIN — CLOPIDOGREL 75 MG: 75 TABLET, FILM COATED ORAL at 05:45

## 2018-12-28 RX ADMIN — MIDAZOLAM HYDROCHLORIDE 2 MG: 1 INJECTION, SOLUTION INTRAMUSCULAR; INTRAVENOUS at 02:11

## 2018-12-28 RX ADMIN — METOPROLOL TARTRATE 25 MG: 25 TABLET, FILM COATED ORAL at 17:07

## 2018-12-28 RX ADMIN — OMEPRAZOLE 20 MG: 20 CAPSULE, DELAYED RELEASE ORAL at 13:51

## 2018-12-28 RX ADMIN — BUPROPION HYDROCHLORIDE 150 MG: 150 TABLET, EXTENDED RELEASE ORAL at 05:46

## 2018-12-28 RX ADMIN — HEPARIN SODIUM: 1000 INJECTION, SOLUTION INTRAVENOUS; SUBCUTANEOUS at 01:57

## 2018-12-28 RX ADMIN — VERAPAMIL HYDROCHLORIDE 5 MG: 2.5 INJECTION, SOLUTION INTRAVENOUS at 01:58

## 2018-12-28 RX ADMIN — ENOXAPARIN SODIUM 40 MG: 100 INJECTION SUBCUTANEOUS at 11:46

## 2018-12-28 RX ADMIN — ASPIRIN 81 MG: 81 TABLET, DELAYED RELEASE ORAL at 05:45

## 2018-12-28 ASSESSMENT — COGNITIVE AND FUNCTIONAL STATUS - GENERAL
MOBILITY SCORE: 24
SUGGESTED CMS G CODE MODIFIER DAILY ACTIVITY: CH
SUGGESTED CMS G CODE MODIFIER MOBILITY: CH
DAILY ACTIVITIY SCORE: 24

## 2018-12-28 ASSESSMENT — PAIN SCALES - GENERAL
PAINLEVEL_OUTOF10: 0
PAINLEVEL_OUTOF10: 7
PAINLEVEL_OUTOF10: 0
PAINLEVEL_OUTOF10: 0
PAINLEVEL_OUTOF10: 6
PAINLEVEL_OUTOF10: 0

## 2018-12-28 ASSESSMENT — LIFESTYLE VARIABLES
EVER_SMOKED: YES
PACK_YEARS: 20
EVER_SMOKED: YES

## 2018-12-28 ASSESSMENT — ENCOUNTER SYMPTOMS
PALPITATIONS: 0
EYES NEGATIVE: 1
MUSCULOSKELETAL NEGATIVE: 1
NERVOUS/ANXIOUS: 1
SHORTNESS OF BREATH: 0
NEUROLOGICAL NEGATIVE: 1
CONSTITUTIONAL NEGATIVE: 1
GASTROINTESTINAL NEGATIVE: 1
CHEST TIGHTNESS: 0
SHORTNESS OF BREATH: 1

## 2018-12-28 ASSESSMENT — PATIENT HEALTH QUESTIONNAIRE - PHQ9
1. LITTLE INTEREST OR PLEASURE IN DOING THINGS: SEVERAL DAYS
2. FEELING DOWN, DEPRESSED, IRRITABLE, OR HOPELESS: SEVERAL DAYS
6. FEELING BAD ABOUT YOURSELF - OR THAT YOU ARE A FAILURE OR HAVE LET YOURSELF OR YOUR FAMILY DOWN: MORE THAN HALF THE DAYS
9. THOUGHTS THAT YOU WOULD BE BETTER OFF DEAD, OR OF HURTING YOURSELF: SEVERAL DAYS
4. FEELING TIRED OR HAVING LITTLE ENERGY: NOT AT ALL
SUM OF ALL RESPONSES TO PHQ9 QUESTIONS 1 AND 2: 2
7. TROUBLE CONCENTRATING ON THINGS, SUCH AS READING THE NEWSPAPER OR WATCHING TELEVISION: NOT AT ALL
8. MOVING OR SPEAKING SO SLOWLY THAT OTHER PEOPLE COULD HAVE NOTICED. OR THE OPPOSITE, BEING SO FIGETY OR RESTLESS THAT YOU HAVE BEEN MOVING AROUND A LOT MORE THAN USUAL: NOT AT ALL
SUM OF ALL RESPONSES TO PHQ QUESTIONS 1-9: 6
3. TROUBLE FALLING OR STAYING ASLEEP OR SLEEPING TOO MUCH: SEVERAL DAYS
5. POOR APPETITE OR OVEREATING: NOT AT ALL

## 2018-12-28 ASSESSMENT — COPD QUESTIONNAIRES
COPD SCREENING SCORE: 3
DO YOU EVER COUGH UP ANY MUCUS OR PHLEGM?: NO/ONLY WITH OCCASIONAL COLDS OR INFECTIONS
HAVE YOU SMOKED AT LEAST 100 CIGARETTES IN YOUR ENTIRE LIFE: YES
DURING THE PAST 4 WEEKS HOW MUCH DID YOU FEEL SHORT OF BREATH: NONE/LITTLE OF THE TIME

## 2018-12-28 NOTE — PROGRESS NOTES
· 2 RN skin check complete with Sharri ESTRADA .   · Devices in place: right radial TR band, cardiac monitoring, BP and SpO2 monitoring, SCDs.  · Skin assessed under devices and intact  · No pressure ulcers or wounds found. Skin grossly intact.  · The following interventions in place: Pt self-turning, extra pillows in place to float extremities, low air loss mattress in use

## 2018-12-28 NOTE — PROGRESS NOTES
Pt up to cic at 0228. 14 ml in TR band. Attempted to deflate, pt began to bleed. Re-inflated to 13 and bleeding stopped. No c/o chest pain on arrival to unit.

## 2018-12-28 NOTE — DISCHARGE PLANNING
Care Transition Team Discharge Planning    Anticipated Discharge Information  Anticipated discharge disposition: Home  Discharge Address: [address]   Discharge Contact Phone Number: [phone]     12/28/18  Identified that patient was placed on legal hold 12/28/18 @ 0412 by ICU RN Sharri Cheema. Received release of legal hold from attending 12/28/18 @ 0972 stating that upon examination this allegedly mental ill person does NOT meet criteria to be certified. Attending stated that patient informed him that he misunderstood nurses question and that he is not suicidal depressed.

## 2018-12-28 NOTE — PROCEDURES
Cardiac Catheterization and Percutaneous Intervention Procedure Report    12/28/2018    Referring MD:     Primary Care Provider: Pcp Pt States None    Indication for procedure: ST elevation myocardial infarction    Procedure:  · Insertion of 5/6 FR sheath in the right radial artery  · right and left coronary arteriograms  · Left heart catheterization and Left ventriculogram  · Angioplasty and placement of a 3.5 by 28 mm Synergy drug-eluting stent in mid  right coronary artery.    Rajiv Sam was brought to the cardiac catheterization lab where the right wrist was prepped and draped in the usual manner for cardiac catheterization.  The area was anesthetized with lidocaine and a 5/6 FR sheath was inserted into the right radial artery without difficulty. A #3.5 left Jayshree catheter was advanced to the ostia of the Left coronary artery and arteriograms were recorded.   A #4 right Jayshree catheter was advanced to the ostia of the right coronary artery and arteriograms were recorded. Aortic valve was crossed using Pigtail catheter left heart catheterization and left ventriculogram were performed.  Patient underwent percutaneous coronary revascularization as outlined below.  At the completion of the case the sheath was removed and hemostasis achieved utilizing a radial compression band .  Patient was pain-free and hemodynamically stable at the completion of the case.  There were no apparent complications.    Left Heart Catheterization:  Left Ventriculogram: ejection fraction 50%, inferior wall hypokinesis  Left Ventricular EDP: 12 mm Hg   Aortic Valve Gradient: No significant AV gradient noted    Coronary arteriograms:  Left main: normal  Left anterior descending and diagonal branches: mild diffuse disease. 30-40% disease in mid to distal LAD. Two diagonals have mild diffuse disease.  Left circumflex: Gives one small marginal and large second marginal. Second marginal has bifurcation Y shaped stents, 60-70%  "in-stent restenosis upper branch.  Right coronary: mid RCA has thrombotic 90% stenosis, dominant      Interventional Procedure RCA:     Given the patient's clinical presentation and coronary anatomy, PCI was indicated and we proceeded with the intervention as detailed below.    Indication for PCI:  STEMI immediate PCI    Pre: 90 %, 25 mm length, REYNOLD 3 flow  Post: 0%, REYNOLD 3 flow    Lesion complexity  Non-High  Severe calcification No  Bifurcation  No    Guide catheter: JR 4 was advanced to the ostia of the right coronary artery.    Guide wire: A 0.014\" mm  Runthrough was advanced into the artery and crossed the lesion.    Balloon pre-dilatation: 2.5 by 12 mm Emerge inflated to 6 EMILY to pre-dilate the lesion.    Stent: A 3.5 by 28 mm Synergy drug-eluting stent was deployed in mid  right coronary artery at 12 EMILY.    Anticoagulant: Heparin  Antiplatelet: Clopidogrel  EBL <25 cc  Complications: none  Specimens: none  Contrast: see cath lab flowsheet  Fluorotime : see cath lab flowsheet    Final diagnosis:   · Successful percutaneous intervention of right coronary artery/arteries as described above.    Sedation: I supervised moderate sedation over a trained independent observer.    Sedation start time: 01:53  End time: 02:11    Recommendations: Guideline directed medical therapy and risk factor management      Electronically signed by   NATTY Zavaleta  Interventional cardiologist  12/28/2018  2:21 AM            "

## 2018-12-28 NOTE — ED PROVIDER NOTES
ED Provider Note    ED Provider Note      Primary care provider: Pcp Pt States None    CHIEF COMPLAINT  Chief Complaint   Patient presents with   • Chest Pain     Reports ~ 2 days of Chest pain. Hx of Cardiac issues (including MI) and has a Psych Hx   • Anxiety       HPI  Rajiv Sam is a 58 y.o. male who presents to the Emergency Department with chief complaint of chest pain.  Patient reports his been having on and off pain over the last few days had an increase in the pain this evening he describes a substernal crushing some radiation into his left shoulder.  He has had nauseousness diaphoresis and minimal headache.  He does report exertional component.  Patient has history of pre-3 previous stent placements he denies being on blood thinner but he is supposed to take a full-strength aspirin every day he ran out of aspirin 3 days prior to is not taking any since that time.  No fevers no chills no cough no congestion pain is rated as a 10 out of 10 without alleviating factors at this time.  Was given 325 of aspirin by EMS.    REVIEW OF SYSTEMS  10 systems reviewed and otherwise negative, pertinent positives and negatives listed in the history of present illness.    PAST MEDICAL HISTORY   has a past medical history of Hypertension; Old MI (myocardial infarction); Psychiatric disorder; and PTSD (post-traumatic stress disorder).    SURGICAL HISTORY  patient denies any surgical history    SOCIAL HISTORY  Social History   Substance Use Topics   • Smoking status: Current Every Day Smoker   • Smokeless tobacco: Never Used   • Alcohol use No      History   Drug Use   • Types: Inhaled     Comment: pot       FAMILY HISTORY  Non-Contributory    CURRENT MEDICATIONS  Home Medications     Reviewed by Patricio Sims R.N. (Registered Nurse) on 12/28/18 at 0116  Med List Status: <None>   Medication Last Dose Status   aspirin EC (ECOTRIN) 81 MG Tablet Delayed Response  Active   buPROPion SR (WELLBUTRIN-SR) 150 MG TABLET SR  "12 HR sustained-release tablet  Active   clopidogrel (PLAVIX) 75 MG Tab  Active   gabapentin (NEURONTIN) 300 MG Cap  Active   lisinopril (PRINIVIL) 20 MG Tab  Active   metformin (GLUCOPHAGE) 1000 MG tablet  Active   metoprolol (LOPRESSOR) 25 MG Tab  Active   QUEtiapine (SEROQUEL) 200 MG Tab  Active   simvastatin (ZOCOR) 20 MG Tab  Active                ALLERGIES  Allergies   Allergen Reactions   • Pcn [Penicillins] Rash     Rash       PHYSICAL EXAM  VITAL SIGNS: /75   Pulse 98   Temp (!) 35.7 °C (96.3 °F) (Temporal)   Resp 18   Ht 1.626 m (5' 4\")   Wt 73 kg (160 lb 15 oz)   BMI 27.62 kg/m²   Pulse ox interpretation: I interpret this pulse ox as normal.  Constitutional: Alert and oriented x 3, moderate distress  HEENT: Atraumatic normocephalic, pupils are equal round reactive to light extraocular movements are intact. The nares is clear, external ears are normal, mouth shows moist mucous membranes  Neck: Supple, no JVD no tracheal deviation  Cardiovascular: Borderline tachycardia no murmur rub or gallop 2+ pulses peripherally x4  Thorax & Lungs: No respiratory distress, no wheezes rales or rhonchi, No chest tenderness.   GI: Soft nontender nondistended positive bowel sounds, no peritoneal signs  Skin: Warm dry no acute rash or lesion  Musculoskeletal: Moving all extremities with full range and 5 of 5 strength, no acute  deformity  Neurologic: Cranial nerves III through XII are grossly intact, no sensory deficit, no cerebellar dysfunction   Psychiatric: Anxious      DIAGNOSTIC STUDIES / PROCEDURES  LABS      Results for orders placed or performed during the hospital encounter of 12/28/18   Troponin   Result Value Ref Range    Troponin I 0.31 (H) 0.00 - 0.04 ng/mL   Btype Natriuretic Peptide   Result Value Ref Range    B Natriuretic Peptide 21 0 - 100 pg/mL   CBC with Differential   Result Value Ref Range    WBC 9.7 4.8 - 10.8 K/uL    RBC 4.13 (L) 4.70 - 6.10 M/uL    Hemoglobin 12.3 (L) 14.0 - 18.0 g/dL    " Hematocrit 36.9 (L) 42.0 - 52.0 %    MCV 89.3 81.4 - 97.8 fL    MCH 29.8 27.0 - 33.0 pg    MCHC 33.3 (L) 33.7 - 35.3 g/dL    RDW 46.4 35.9 - 50.0 fL    Platelet Count 526 (H) 164 - 446 K/uL    MPV 8.6 (L) 9.0 - 12.9 fL    Neutrophils-Polys 72.90 (H) 44.00 - 72.00 %    Lymphocytes 14.10 (L) 22.00 - 41.00 %    Monocytes 9.90 0.00 - 13.40 %    Eosinophils 1.60 0.00 - 6.90 %    Basophils 1.10 0.00 - 1.80 %    Immature Granulocytes 0.40 0.00 - 0.90 %    Nucleated RBC 0.00 /100 WBC    Neutrophils (Absolute) 7.05 1.82 - 7.42 K/uL    Lymphs (Absolute) 1.36 1.00 - 4.80 K/uL    Monos (Absolute) 0.96 (H) 0.00 - 0.85 K/uL    Eos (Absolute) 0.15 0.00 - 0.51 K/uL    Baso (Absolute) 0.11 0.00 - 0.12 K/uL    Immature Granulocytes (abs) 0.04 0.00 - 0.11 K/uL    NRBC (Absolute) 0.00 K/uL   Complete Metabolic Panel (CMP)   Result Value Ref Range    Sodium 131 (L) 135 - 145 mmol/L    Potassium 4.0 3.6 - 5.5 mmol/L    Chloride 98 96 - 112 mmol/L    Co2 23 20 - 33 mmol/L    Anion Gap 10.0 0.0 - 11.9    Glucose 275 (H) 65 - 99 mg/dL    Bun 18 8 - 22 mg/dL    Creatinine 1.50 (H) 0.50 - 1.40 mg/dL    Calcium 8.7 8.5 - 10.5 mg/dL    AST(SGOT) 12 12 - 45 U/L    ALT(SGPT) 9 2 - 50 U/L    Alkaline Phosphatase 92 30 - 99 U/L    Total Bilirubin 0.3 0.1 - 1.5 mg/dL    Albumin 3.7 3.2 - 4.9 g/dL    Total Protein 6.4 6.0 - 8.2 g/dL    Globulin 2.7 1.9 - 3.5 g/dL    A-G Ratio 1.4 g/dL   Prothrombin Time   Result Value Ref Range    PT 13.1 12.0 - 14.6 sec    INR 0.98 0.87 - 1.13   APTT   Result Value Ref Range    APTT 30.8 24.7 - 36.0 sec   Lipase   Result Value Ref Range    Lipase 250 (H) 11 - 82 U/L   ESTIMATED GFR   Result Value Ref Range    GFR If  58 (A) >60 mL/min/1.73 m 2    GFR If Non  48 (A) >60 mL/min/1.73 m 2   POC ACT (resulted by nursing)   Result Value Ref Range    Istat Activated Clotting Time 290 (H) 74 - 137 sec   EKG   Result Value Ref Range    Report       [facility] Emergency  Dept.    Test Date:  2018  Pt Name:    EVERARDO JACKSON                Department: ER  MRN:        [id number]                      Room:       GR 39  Gender:     Male                         Technician: [id number]  :        1960                   Requested By:ER TRIAGE PROTOCOL  Order #:    [id number]                    Reading MD: JAY MAHMOOD MD    Measurements  Intervals                                Axis  Rate:       94                           P:          66  NY:         236                          QRS:        166  QRSD:       122                          T:          57  QT:         388  QTc:        486    Interpretive Statements  Acute inferior ST elevation myocardial function  Electronically Signed On 2018 1:46:04 PST by JAY MAHMOOD MD     EKG STAT   Result Value Ref Range    Report       [facility]    Test Date:  2018  Pt Name:    EVERARDO JACKSON                Department: ER  MRN:        [id number]                      Room:       T601  Gender:     Male                         Technician: PC  :        1960                   Requested By:NICOLÁS ALDANA  Order #:    [id number]                    Reading MD:    Measurements  Intervals                                Axis  Rate:       87                           P:          67  NY:         160                          QRS:        -166  QRSD:       106                          T:          14  QT:         392  QTc:        472    Interpretive Statements  SINUS RHYTHM  IRBBB AND LPFB  LOW VOLTAGE WITH RIGHT AXIS DEVIATION  LATE PRECORDIAL R/S TRANSITION  CONSIDER INFERIOR INFARCT  Compared to ECG 2018 01:08:59  Left posterior fascicular block now present  Incomplete right bundle-branch block now present  Right-axis deviation now present  Low QRS voltage now present  Myocardial infarct finding now p resent  ST (T wave) deviation no longer present         All labs reviewed by me.      RADIOLOGY  DX-CHEST-LIMITED (1  "VIEW)   Final Result         No acute cardiopulmonary abnormalities are identified.        The radiologist's interpretation of all radiological studies have been reviewed by me.    COURSE & MEDICAL DECISION MAKING  Pertinent Labs & Imaging studies reviewed. (See chart for details)    1:43 AM - Patient seen and examined at bedside.  Patient brought back from triage I was presented with triage EKG which demonstrates an acute inferior ST elevation myocardial infarction STEMI activated.      Patient was evaluated in conjunction with cardiologist Dr. June at the bedside he is in agreement with ST elevation myocardial infarction the patient's transported to the Cath Lab for further evaluation and treatment.  I also discussed case with hospitalist Dr. Garcia who will admit the patient.      Patient noted to have slightly elevated blood pressure likely circumstantial secondary to presenting complaint. Referred to primary care physician for further evaluation.      /75   Pulse 98   Temp (!) 35.7 °C (96.3 °F) (Temporal)   Resp 18   Ht 1.626 m (5' 4\")   Wt 73 kg (160 lb 15 oz)   BMI 27.62 kg/m²             FINAL IMPRESSION  1. ST elevation myocardial infarction (STEMI), unspecified artery (HCC)    2.  Chest pain      This dictation has been created using voice recognition software and/or scribes. The accuracy of the dictation is limited by the abilities of the software and the expertise of the scribes. I expect there may be some errors of grammar and possibly content. I made every attempt to manually correct the errors within my dictation. However, errors related to voice recognition software and/or scribes may still exist and should be interpreted within the appropriate context.            "

## 2018-12-28 NOTE — CONSULTS
Cardiology Initial Consult Note    Date of note:    12/28/2018      Consulting Physician: Victor Hugo Steinberg M.D.    Patient ID:    Name:   Rajiv Sam     YOB: 1960  Age:   58 y.o.  male   MRN:   [id number]      Reason for Consultation: STEMI    HPI:  Rajiv Sam is a 58 y.o.-year-old male with a history of hypertension, dyslipidemia, diabetes, CAD s/p PCI x 3, PTSD, bipolar, and anxiety who presents with chest pain.  He reports mild to moderate severity chest pain for the last three days and then acute onset of severe 10/10 crushing substernal chest pain around 3 hours prior to presentation which was associated with SOB and diaphoresis.  Morphine and ntg improved the chest pain slightly in the ER.  EKG showed inferior STEMI.    Of note, he has a history of meth use, however denies any currently.  He did smoke marijuana a couple hours before presentation. His last cigarette was yesterday.      A couple days ago he ran out of aspirin per report as he could not afford it.  He brings all his other medications with him and they do not include DAPT.       ROS  Constitution: Negative for chills, fever and night sweats.   HENT: Negative for nosebleeds.    Eyes: Negative for vision loss in left eye and vision loss in right eye.   Respiratory: Negative for hemoptysis.    Gastrointestinal: Negative for hematemesis, hematochezia and melena.   Genitourinary: Negative for hematuria.   Neurological: Negative for focal weakness, numbness and paresthesias.     Unable to fully review all systems due to acuity of illness.     Past Medical History:   Diagnosis Date   • Hypertension    • Old MI (myocardial infarction)    • Psychiatric disorder     depression    • PTSD (post-traumatic stress disorder)        Past Surgical History:   Procedure Laterality Date   • CARDIAC CATH, LEFT HEART         Current Outpatient Prescriptions   Medication Sig Dispense Refill   • buPROPion SR (WELLBUTRIN-SR) 150 MG TABLET SR 12 HR  "sustained-release tablet Take 150 mg by mouth 2 times a day. 0900,1400     • metformin (GLUCOPHAGE) 1000 MG tablet Take 1,000 mg by mouth 2 times a day, with meals.     • clopidogrel (PLAVIX) 75 MG Tab Take 75 mg by mouth every day.     • metoprolol (LOPRESSOR) 25 MG Tab Take 25 mg by mouth 2 times a day.     • gabapentin (NEURONTIN) 300 MG Cap Take 1 Cap by mouth 3 times a day. 90 Cap    • QUEtiapine (SEROQUEL) 200 MG Tab Take 1 Tab by mouth 2 Times a Day. 60 Tab 3   • simvastatin (ZOCOR) 20 MG Tab Take 1 Tab by mouth every evening. 30 Tab 3   • aspirin EC (ECOTRIN) 81 MG Tablet Delayed Response Take 81 mg by mouth every day.     • lisinopril (PRINIVIL) 20 MG Tab Take 20 mg by mouth every day.           Allergies   Allergen Reactions   • Pcn [Penicillins] Rash     Rash         History reviewed. No pertinent family history.      Social History     Social History   • Marital status: Single     Spouse name: N/A   • Number of children: N/A   • Years of education: N/A     Occupational History   • Not on file.     Social History Main Topics   • Smoking status: Current Every Day Smoker   • Smokeless tobacco: Never Used   • Alcohol use No   • Drug use: Yes     Types: Inhaled      Comment: pot   • Sexual activity: Not on file     Other Topics Concern   • Not on file     Social History Narrative   • No narrative on file         Physical Exam  Body mass index is 27.62 kg/m².  Blood pressure 126/88, pulse 100, temperature (!) 35.7 °C (96.3 °F), temperature source Temporal, resp. rate 16, height 1.626 m (5' 4\"), weight 73 kg (160 lb 15 oz).  Vitals:    12/28/18 0113 12/28/18 0127 12/28/18 0129   BP: 123/95 134/82 126/88   Pulse: 92 90 100   Resp: 18 (!) 22 16   Temp: (!) 35.7 °C (96.3 °F)     TempSrc: Temporal     Weight: 73 kg (160 lb 15 oz)     Height: 1.626 m (5' 4\")       Oxygen Therapy:       General: clear apparent distress.   Eyes: nl conjunctiva  ENT: OP clear  Neck: JVP 4-5 cm H2O, no carotid bruits  Lungs: normal " respiratory effort, CTAB  Heart: RRR, no murmurs, no rubs or gallops, no edema bilateral lower extremities. No LV/RV heave on cardiac palpatation. 2+ bilateral radial pulses.  2+ bilateral dp pulses.   Abdomen: soft, non tender, non distended, no masses, normal bowel sounds.  No HSM.  Extremities/MSK: no clubbing, no cyanosis  Neurological: No focal sensory deficits  Psychiatric: Appropriate affect, A/O x 3  Skin: Warm extremities        Labs (personally reviewed and notable for):   hgb 12.3  Last creatinine 1.3      Cardiac Imaging and Procedures Review:    EKG dated 11/28/2018: My personal interpretation is NSR, 1st degree AV block, RBBB, LPFB, inferior st elevation with diffuse anterior and lateral ST depression.     Echo dated 11/4/2018:   FINDINGS  Left Ventricle  Normal left ventricular chamber size. Normal left ventricular wall   thickness. Normal left ventricular systolic function. Left ventricular   ejection fraction is visually estimated to be 70%. Normal regional wall   motion. Normal diastolic function.    Right Ventricle  The right ventricle was normal in size and function.    Right Atrium  The right atrium is normal in size. Normal inferior vena cava size and   inspiratory collapse.    Left Atrium  The left atrium is normal in size. Left atrial volume index is 19 mL/sq   m.    Mitral Valve  Structurally normal mitral valve without significant stenosis or   regurgitation.    Aortic Valve  Structurally normal aortic valve without significant stenosis or   regurgitation.    Tricuspid Valve  Structurally normal tricuspid valve without significant stenosis or   regurgitation.    Pulmonic Valve  The pulmonic valve is not well visualized. No stenosis or regurgitation   seen.    Pericardium  Normal pericardium without effusion.    Aorta  The aortic root is normal. Ascending aorta diameter is 3.1 cm.      Nuclear Perfusion Imaging (11/3/2018):   Myocardial Perfusion   Report   NUCLEAR IMAGING  INTERPRETATION   No evidence of ischemia.   Lateral wall photopenia which is more prominent on rest images may be related   to attenuation artifact.     Normal left ventricular size, ejection fraction, and wall motion.    Radiology test Review:  CXR: 11/8/2018     FINDINGS:  Lines/tubes:  None.    Lungs:  No pulmonary infiltrates or consolidations are noted.    Pleura:  There is no pleural effusion or pneumothorax.    Heart and mediastinum:  The heart silhouette is within normal limits.    Bones:  Minimal mid thoracic scoliosis convex right is present.    CT scan reviewed, likely LCx or OM stent.       Impression and Medical Decision Making:  # STEMI in the setting of stopping aspirin. Frequent Ectopy noted on EKG in the emergency room.   # Diabetes  # CAD s/p previous PCI x 3.  PCI x 2 at [facility] 6/2017 per patient report. PCI x 1 for in stent restenosis at [place] in 8/2018.   # PTSD  # Bipolar disorder  # Anxiety       Recommendations:  # Emergent cardiac catheterization  # s/p aspirin by EMS  # s/p heparin 5000 in the ER  # DAPT TBD by interventionalist  # echo  # obtain OSH records from [place] and [facility]  # check u tox  # SW consultation to ensure he gets a  as outpatient to help coordinate his care and prevent readmissions  # switch statin to lipitor 40mg PO daily  # continue ACE/metoprolol if BP tolerate  # if STEMI confirmed, admit to CCU afterwards.       Thank you for allowing me to participate in the care of this patient, Cardiology will continue to follow.  Please contact me with any questions.    Cardiology Critical Care Documentation    This patient is critically ill with STEMI with continued chest pain and unstable arrhythmias on monitor.  I have spent a total of 38 minutes examining this patient, all lab data, x-ray, EKG and discussion with nursing and physicians as well as performing procedures.  There has been no overlap with other physicians or billable procedures.   More than 50% of the time was spent Face-To-Face with the patient.    Norm June MD  Cardiologist, [facility] [place]   [phone]

## 2018-12-28 NOTE — DISCHARGE PLANNING
Care Transition Team Assessment    Information Source  Orientation : Oriented x 4  Information Given By: Patient  Who is responsible for making decisions for patient? : Patient         Elopement Risk  Legal Hold: No  Ambulatory or Self Mobile in Wheelchair: No-Not an Elopement Risk  Disoriented: No  Psychiatric Symptoms: None  History of Wandering: No  Elopement this Admit: No  Vocalizing Wanting to Leave: No  Displays Behaviors, Body Language Wanting to Leave: No-Not at Risk for Elopement  Elopement Risk: Not at Risk for Elopement  Wanderguard On: No (See Comments)    Interdisciplinary Discharge Planning  Does Admitting Nurse Feel This Could be a Complex Discharge?: No  Lives with - Patient's Self Care Capacity: Friends  Patient or legal guardian wants to designate a caregiver (see row info): No  Support Systems: Friends / Neighbors  Housing / Facility: 1 Story Apartment / Condo  Do You Take your Prescribed Medications Regularly: No  Reasons Why Not Taking Medications : Didn't Refill Prescriptions   Able to Return to Previous ADL's: Yes  Mobility Issues: No  Prior Services: None  Patient Expects to be Discharged to:: Home   Assistance Needed: No  Durable Medical Equipment: Not Applicable    Discharge Preparedness  What is your plan after discharge?: Other (comment) (No needs)  What are your discharge supports?: Other (comment) (Friend)  Prior Functional Level: Ambulatory, Independent with Activities of Daily Living, Independent with Medication Management  Difficulity with ADLs: None  Difficulity with IADLs: None    Functional Assesment  Prior Functional Level: Ambulatory, Independent with Activities of Daily Living, Independent with Medication Management    Finances  Financial Barriers to Discharge: No  Prescription Coverage: Yes    Vision / Hearing Impairment  Vision Impairment : Yes  Right Eye Vision: Wears Glasses  Left Eye Vision: Wears Glasses  Hearing Impairment : No    Values / Beliefs / Concerns  Values /  Beliefs Concerns : No    Advance Directive  Advance Directive?: None  Advance Directive offered?: AD Booklet refused    Domestic Abuse  Have you ever been the victim of abuse or violence?: Not Sure  Physical Abuse or Sexual Abuse: No  Verbal Abuse or Emotional Abuse: No  Possible Abuse Reported to:: Not Applicable    Psychological Assessment  History of Substance Abuse: Marijuana, Methamphetamine  Date Last Used - Marijuana:  (day of admission)  Date Last Used - Methamphetamine:  (day of admission)  History of Psychiatric Problems: Yes  Newly Diagnosed Illness: No    Discharge Risks or Barriers  Discharge risks or barriers?: No PCP, Substance abuse  Patient risk factors: No PCP, Substance abuse    Anticipated Discharge Information  Anticipated discharge disposition: Home  Discharge Address: [facility] [address]   Discharge Contact Phone Number: [phone]

## 2018-12-28 NOTE — DISCHARGE PLANNING
Medical Social Work     Referral: STEMI    SW responded to a STEMI. The pt was BIB [facility]. The pt was alert upon arrival. The pt name is Rajiv Sam (: 62). MISA spoke with the pt at bedside and he advised MISA that he would like MISA to call his roommate Herb who is emergency contact at [phone]. MISA called and notified Herb that the pt is going to be admitted to [facility] and provided Herb with [facility]'s contact information.     Herb (roomate) [phone]    Plan: MISA will remain available for pt support.

## 2018-12-28 NOTE — ASSESSMENT & PLAN NOTE
Manic depressive episodes.  Did state today that he no longer wants to live, does not have active plans for suicide.  Plan for sitter at bedside

## 2018-12-28 NOTE — PROGRESS NOTES
Med rec complete per RX bottles at bedside  Reviewed RX bottles with pt and returned to pt at bedside  Allergies have been verified and updated  No oral ABX within the last 30 days

## 2018-12-28 NOTE — PROGRESS NOTES
Cardiology Follow Up Progress Note    Date of Service  12/28/2018    Attending Physician  Rg Robin M.D.    Chief Complaint   Chest pain.    HPI  Rajiv Sam is a 58 y.o. male admitted 12/28/2018 with the history of coronary artery disease and multiple previous coronary interventions presented with a inferior STEMI with RCA stent.  Noncompliant with dual antiplatelet therapy.    Interim Events  12/28: Denies chest pain.  Taking his medications.    Review of Systems  Review of Systems   Respiratory: Negative for chest tightness and shortness of breath.    Cardiovascular: Negative for chest pain and palpitations.       Vital signs in last 24 hours  Temp:  [35.7 °C (96.3 °F)-36.1 °C (97 °F)] 36.1 °C (97 °F)  Pulse:  [] 63  Resp:  [16-25] 17  BP: (107-134)/(75-95) 107/75    Physical Exam  Physical Exam   Constitutional: He is oriented to person, place, and time. He appears well-nourished. No distress.   HENT:   Head: Atraumatic.   Eyes: EOM are normal.   Neck: No JVD present.       Cardiovascular: Normal rate, regular rhythm, S1 normal, S2 normal, normal heart sounds and intact distal pulses.  Exam reveals no gallop and no friction rub.    No murmur heard.  Pulmonary/Chest: Effort normal and breath sounds normal. He has no wheezes. He has no rhonchi. He has no rales.   Musculoskeletal: He exhibits no edema.   Cath site without hematoma 2+ right radial pulse.   Neurological: He is alert and oriented to person, place, and time.   Skin: Skin is warm and dry.   Psychiatric: He has a normal mood and affect. His behavior is normal.       Lab Review  Lab Results   Component Value Date/Time    WBC 9.7 12/28/2018 01:25 AM    RBC 4.13 (L) 12/28/2018 01:25 AM    HEMOGLOBIN 12.3 (L) 12/28/2018 01:25 AM    HEMATOCRIT 36.9 (L) 12/28/2018 01:25 AM    MCV 89.3 12/28/2018 01:25 AM    MCH 29.8 12/28/2018 01:25 AM    MCHC 33.3 (L) 12/28/2018 01:25 AM    MPV 8.6 (L) 12/28/2018 01:25 AM      Lab Results   Component Value  Date/Time    SODIUM 131 (L) 12/28/2018 01:25 AM    POTASSIUM 4.0 12/28/2018 01:25 AM    CHLORIDE 98 12/28/2018 01:25 AM    CO2 23 12/28/2018 01:25 AM    GLUCOSE 275 (H) 12/28/2018 01:25 AM    BUN 18 12/28/2018 01:25 AM    CREATININE 1.50 (H) 12/28/2018 01:25 AM      Lab Results   Component Value Date/Time    ASTSGOT 12 12/28/2018 01:25 AM    ALTSGPT 9 12/28/2018 01:25 AM     Lab Results   Component Value Date/Time    CHOLSTRLTOT 107 11/04/2018 01:31 AM    LDL 46 11/04/2018 01:31 AM    HDL 43 11/04/2018 01:31 AM    TRIGLYCERIDE 90 11/04/2018 01:31 AM    TROPONINI 2.78 (H) 12/28/2018 05:58 AM       Recent Labs      12/28/18   0125   BNPBTYPENAT  21       Cardiac Imaging and Procedures Review  Rhythm: Normal sinus rhythm, rate 64.    ECHOCARDIOGRAM 11/03/2018  Normal left ventricular ejection fraction 70%.  No other abnormalities.    CARDIAC CATHETERIZATION 12/28/2018  Indication for procedure: ST elevation myocardial infarction   Procedure:  · Insertion of 5/6 FR sheath in the right radial artery  · right and left coronary arteriograms  · Left heart catheterization and Left ventriculogram  · Angioplasty and placement of a 3.5 by 28 mm Synergy drug-eluting stent in mid  right coronary artery.    Imaging  Chest X-Ray: 12/28/2018 normal.    Assessment/Plan   Inferior STEMI.  RCA stent.  12/28/2018.  Low blood pressure.  Previous circumflex bifurcation stenting.  2017 and [facility].  Renal insufficiency worse post coronary angiography.  Conduction system disease: Right bundle branch block.  Left posterior fascicular block.  Hypertension.  Diabetes mellitus.  Dyslipidemia.  PTSD with bipolar disorder and anxiety.  Noncompliance with medications.    Recommendation Discussion  1.  Stable to be transferred to telemetry.  2.  Continue telemetry monitoring.  3.  Explained to the patient the nature of his cardiac event that occurred last evening and the treatment.  4.  Explained to the  patient the need for lifelong compliance with aspirin and Plavix.  5.  Nursing education and transition outpatient follow-up to ensure medication compliance to avoid readmission and demise risk future morbidity and mortality related to his coronary artery disease.  6.  Discontinue lisinopril.  7.  IV fluids.  8.  Follow-up BMP.  9.  I personally reviewed the images of the coronary angiogram and coronary intervention that were performed on 12/28/2018.  10.  We will order echocardiogram.    Discussed with RN.  Please contact me with any questions.    Chi Fleming M.D.   Cardiologist, [facility]  [phone]

## 2018-12-28 NOTE — PROGRESS NOTES
Patient admitted by Dr. Garcia this morning  Patient seen and examined, he is chest pain-free and breathing comfortably after the procedure  I discussed the patient's mood and assessed for any suicidality  Patient tells me that the events he described to the overnight nurse actually related to a possible happened months, he did not have any thoughts of hurting himself prior to admission or now, he is not currently sprinting depression, legal hold decertified  OK for telemetry per cardiology

## 2018-12-28 NOTE — ED TRIAGE NOTES
Pt reports a previous Cardiac Hx including MI. Has had 2 x days of Chest pain and Anxiety. Pt also states that he does have a Psych Hx. Reports having recent MI with stents. Has been out of his medications for the last several days.

## 2018-12-28 NOTE — CARE PLAN
Problem: Venous Thromboembolism (VTW)/Deep Vein Thrombosis (DVT) Prevention:  Goal: Patient will participate in Venous Thrombosis (VTE)/Deep Vein Thrombosis (DVT)Prevention Measures  Lovenox prescribed per MD.    Problem: Pain Management  Goal: Pain level will decrease to patient's comfort goal  Pain declines pain.

## 2018-12-28 NOTE — H&P
"Hospital Medicine History & Physical Note    Date of Service  12/28/2018    Primary Care Physician  Pcp Pt States None    Consultants  Cardiology Dr. June    Code Status  Full code     Chief Complaint  Chest pain    History of Presenting Illness  58 y.o. Male with history of coronary artery disease, with prior myocardial infarction, depression with manic episodes, and posttraumatic stress disorder, was apparently in his usual state of health until 1-1/2 days prior to admission.  He began to have chest pain described as pressure on the left side of his chest.  This seemed to radiate to his shoulder.  This occurred frequently, at rest and with activity, over the next several hours.  Just prior to admission, he noted radiation to his antecubital fossa on the left, which alerted him that it was \"more serious\" than normal.  He subsequently presented to the emergency department, whereupon he was found to have a ST elevated myocardial infarction.  He was subsequently taken to the cardiac catheterization laboratory.  He reports associated shortness of breath and anxiety, he has no other complaints.    Review of Systems  Review of Systems   Constitutional: Negative.    HENT: Negative.    Eyes: Negative.    Respiratory: Positive for shortness of breath.    Cardiovascular: Positive for chest pain.   Gastrointestinal: Negative.    Genitourinary: Negative.    Musculoskeletal: Negative.    Skin: Negative.    Neurological: Negative.    Endo/Heme/Allergies: Negative.    Psychiatric/Behavioral: The patient is nervous/anxious.        Past Medical History   has a past medical history of Hypertension; Old MI (myocardial infarction); Psychiatric disorder; and PTSD (post-traumatic stress disorder).    Surgical History   has no past surgical history on file.     Family History  family history includes Cancer in his mother.     Social History   reports that he has been smoking.  He has never used smokeless tobacco. He reports that he " uses drugs, including Inhaled. He reports that he does not drink alcohol.    Allergies  Allergies   Allergen Reactions   • Pcn [Penicillins] Rash     Rash       Medications  Prior to Admission Medications   Prescriptions Last Dose Informant Patient Reported? Taking?   QUEtiapine (SEROQUEL) 200 MG Tab  MAR from Other Facility No No   Sig: Take 1 Tab by mouth 2 Times a Day.   aspirin EC (ECOTRIN) 81 MG Tablet Delayed Response  MAR from Other Facility Yes No   Sig: Take 81 mg by mouth every day.   buPROPion SR (WELLBUTRIN-SR) 150 MG TABLET SR 12 HR sustained-release tablet  MAR from Other Facility Yes No   Sig: Take 150 mg by mouth 2 times a day. 0900,1400   clopidogrel (PLAVIX) 75 MG Tab  MAR from Other Facility Yes No   Sig: Take 75 mg by mouth every day.   gabapentin (NEURONTIN) 300 MG Cap  MAR from Other Facility No No   Sig: Take 1 Cap by mouth 3 times a day.   lisinopril (PRINIVIL) 20 MG Tab  MAR from Other Facility Yes No   Sig: Take 20 mg by mouth every day.   metformin (GLUCOPHAGE) 1000 MG tablet  MAR from Other Facility Yes No   Sig: Take 1,000 mg by mouth 2 times a day, with meals.   metoprolol (LOPRESSOR) 25 MG Tab  MAR from Other Facility Yes No   Sig: Take 25 mg by mouth 2 times a day.   simvastatin (ZOCOR) 20 MG Tab  MAR from Other Facility No No   Sig: Take 1 Tab by mouth every evening.      Facility-Administered Medications: None       Physical Exam  Temp:  [35.7 °C (96.3 °F)] 35.7 °C (96.3 °F)  Pulse:  [] 98  Resp:  [16-22] 18  BP: (107-134)/(75-95) 107/75    Physical Exam   Constitutional: He is oriented to person, place, and time. He appears well-developed and well-nourished. No distress.   HENT:   Head: Normocephalic and atraumatic.   Eyes: Pupils are equal, round, and reactive to light. Conjunctivae are normal.   Neck: Normal range of motion. Neck supple. No tracheal deviation present. No thyromegaly present.   Cardiovascular: Normal rate, regular rhythm and normal heart sounds.  Exam  reveals no gallop and no friction rub.    No murmur heard.  Pulmonary/Chest: Effort normal and breath sounds normal. No respiratory distress. He has no wheezes.   Abdominal: Soft. Bowel sounds are normal. He exhibits no distension and no mass. There is no tenderness. There is no rebound and no guarding.   Musculoskeletal: Normal range of motion. He exhibits no edema.   Lymphadenopathy:     He has no cervical adenopathy.   Neurological: He is alert and oriented to person, place, and time. No cranial nerve deficit.   Skin: Skin is warm and dry. He is not diaphoretic.   Psychiatric: He has a normal mood and affect.   Nursing note and vitals reviewed.      Laboratory:  Recent Labs      12/28/18   0125   WBC  9.7   RBC  4.13*   HEMOGLOBIN  12.3*   HEMATOCRIT  36.9*   MCV  89.3   MCH  29.8   MCHC  33.3*   RDW  46.4   PLATELETCT  526*   MPV  8.6*         No results for input(s): ALTSGPT, ASTSGOT, ALKPHOSPHAT, TBILIRUBIN, DBILIRUBIN, GAMMAGT, AMYLASE, LIPASE, ALB, PREALBUMIN, GLUCOSE in the last 72 hours.              No results for input(s): TROPONINI in the last 72 hours.    Urinalysis:    No results found     Imaging:  DX-CHEST-LIMITED (1 VIEW)   Final Result         No acute cardiopulmonary abnormalities are identified.            Assessment/Plan:  I anticipate this patient will require at least two midnights for appropriate medical management, necessitating inpatient admission.    * STEMI (ST elevation myocardial infarction) ([facility])   Assessment & Plan    Plan for urgent cardiac catheterization, cardiology consult Dr. June.  Admit to [facility] post procedurally      PTSD (post-traumatic stress disorder)- (present on admission)   Assessment & Plan    On medication regimen.  Monitor.      Hypertension- (present on admission)   Assessment & Plan    Questionable medication compliance due to financial issues.  Monitor.          VTE prophylaxis: SCD, heparin

## 2018-12-28 NOTE — ASSESSMENT & PLAN NOTE
Plan for urgent cardiac catheterization, cardiology consult Dr. June.  Admit to [facility]IC post procedurally

## 2018-12-28 NOTE — ASSESSMENT & PLAN NOTE
Status post emergent cardiac catheterization with PCI  MICHOACANO to the mid RCA  Continue aspirin, high intensity statin, metoprolol

## 2018-12-29 ENCOUNTER — TELEPHONE (OUTPATIENT)
Dept: VASCULAR LAB | Facility: MEDICAL CENTER | Age: 58
End: 2018-12-29

## 2018-12-29 ENCOUNTER — ANTICOAGULATION MONITORING (OUTPATIENT)
Dept: VASCULAR LAB | Facility: MEDICAL CENTER | Age: 58
End: 2018-12-29

## 2018-12-29 VITALS
HEIGHT: 64 IN | RESPIRATION RATE: 19 BRPM | SYSTOLIC BLOOD PRESSURE: 107 MMHG | DIASTOLIC BLOOD PRESSURE: 75 MMHG | OXYGEN SATURATION: 99 % | HEART RATE: 77 BPM | TEMPERATURE: 96.7 F | WEIGHT: 129.41 LBS | BODY MASS INDEX: 22.09 KG/M2

## 2018-12-29 PROBLEM — I21.3 STEMI (ST ELEVATION MYOCARDIAL INFARCTION) (HCC): Status: RESOLVED | Noted: 2018-12-28 | Resolved: 2018-12-29

## 2018-12-29 PROCEDURE — 700102 HCHG RX REV CODE 250 W/ 637 OVERRIDE(OP): Performed by: HOSPITALIST

## 2018-12-29 PROCEDURE — 99232 SBSQ HOSP IP/OBS MODERATE 35: CPT | Performed by: INTERNAL MEDICINE

## 2018-12-29 PROCEDURE — A9270 NON-COVERED ITEM OR SERVICE: HCPCS | Performed by: HOSPITALIST

## 2018-12-29 PROCEDURE — 99231 SBSQ HOSP IP/OBS SF/LOW 25: CPT | Performed by: INTERNAL MEDICINE

## 2018-12-29 PROCEDURE — 99239 HOSP IP/OBS DSCHRG MGMT >30: CPT | Performed by: HOSPITALIST

## 2018-12-29 RX ORDER — OMEPRAZOLE 20 MG/1
20 CAPSULE, DELAYED RELEASE ORAL DAILY
Qty: 30 CAP | Refills: 2 | Status: SHIPPED | OUTPATIENT
Start: 2018-12-30 | End: 2020-01-07

## 2018-12-29 RX ORDER — NITROGLYCERIN 0.4 MG/1
0.4 TABLET SUBLINGUAL PRN
Qty: 25 TAB | Refills: 0 | Status: SHIPPED | OUTPATIENT
Start: 2018-12-29 | End: 2020-01-07

## 2018-12-29 RX ORDER — GABAPENTIN 300 MG/1
300 CAPSULE ORAL 3 TIMES DAILY
Qty: 90 CAP | Refills: 2 | Status: SHIPPED | OUTPATIENT
Start: 2018-12-29 | End: 2019-02-13 | Stop reason: CLARIF

## 2018-12-29 RX ORDER — CLOPIDOGREL BISULFATE 75 MG/1
75 TABLET ORAL DAILY
Qty: 30 TAB | Refills: 6 | Status: SHIPPED | OUTPATIENT
Start: 2018-12-29 | End: 2018-12-29

## 2018-12-29 RX ORDER — ATORVASTATIN CALCIUM 80 MG/1
80 TABLET, FILM COATED ORAL EVERY EVENING
Qty: 30 TAB | Refills: 2 | Status: SHIPPED | OUTPATIENT
Start: 2018-12-29

## 2018-12-29 RX ORDER — SODIUM CHLORIDE 9 MG/ML
INJECTION, SOLUTION INTRAVENOUS CONTINUOUS
Status: DISCONTINUED | OUTPATIENT
Start: 2018-12-29 | End: 2018-12-29 | Stop reason: HOSPADM

## 2018-12-29 RX ORDER — CLOPIDOGREL BISULFATE 75 MG/1
75 TABLET ORAL DAILY
Qty: 30 TAB | Refills: 2 | Status: SHIPPED | OUTPATIENT
Start: 2018-12-30 | End: 2020-01-07

## 2018-12-29 RX ADMIN — OMEPRAZOLE 20 MG: 20 CAPSULE, DELAYED RELEASE ORAL at 08:50

## 2018-12-29 RX ADMIN — CLOPIDOGREL 75 MG: 75 TABLET, FILM COATED ORAL at 08:55

## 2018-12-29 RX ADMIN — ASPIRIN 81 MG: 81 TABLET, DELAYED RELEASE ORAL at 08:49

## 2018-12-29 RX ADMIN — QUETIAPINE FUMARATE 200 MG: 100 TABLET ORAL at 08:50

## 2018-12-29 RX ADMIN — METOPROLOL TARTRATE 25 MG: 25 TABLET, FILM COATED ORAL at 08:49

## 2018-12-29 RX ADMIN — BUPROPION HYDROCHLORIDE 150 MG: 150 TABLET, EXTENDED RELEASE ORAL at 08:49

## 2018-12-29 ASSESSMENT — PAIN SCALES - GENERAL
PAINLEVEL_OUTOF10: 0

## 2018-12-29 ASSESSMENT — ENCOUNTER SYMPTOMS
NECK PAIN: 0
SPEECH CHANGE: 0
FOCAL WEAKNESS: 0
PHOTOPHOBIA: 0
PALPITATIONS: 0
HEADACHES: 0
SPUTUM PRODUCTION: 0
CHEST TIGHTNESS: 0
MYALGIAS: 0
SHORTNESS OF BREATH: 0
NAUSEA: 0
COUGH: 0
ORTHOPNEA: 0
FEVER: 0
STRIDOR: 0
VOMITING: 0
ABDOMINAL PAIN: 0
BRUISES/BLEEDS EASILY: 0
DIAPHORESIS: 0
SEIZURES: 0

## 2018-12-29 NOTE — PROGRESS NOTES
Pt left AMA. Dr Robin aware and discussed risks associated with leaving AMA with pt prior to leaving. Rx were sent to pt pharmacy and pt verbalizes importance of taking rx as prescribed. Pt states that he is able to afford medications and will take them as needed. All questions were answered and all belongins were accounted for and with pt at time of leaving AMA.

## 2018-12-29 NOTE — PROGRESS NOTES
Pt notified the Dr Salazar want to keep him one more day for observation. Pt states that he needs to pay rent so he does not get evicted. He also states that he will leave AMA if he has to. Social work was called and asked to come speak with pt. Meds to bed was paged in order to get plavix to pt prior to leaving. Awaiting return call or visit to bedside. Dr Robin notified of pt wanting to leave AMA.

## 2018-12-29 NOTE — CARE PLAN
Problem: Communication  Goal: The ability to communicate needs accurately and effectively will improve  Outcome: NOT MET  Pt agitated and unable to communicate needs    Problem: Psychosocial Needs:  Goal: Level of anxiety will decrease  Outcome: NOT MET  Pt irritable and uncooperative

## 2018-12-29 NOTE — PROGRESS NOTES
The pt does not want to pay the $12.25 for the Rx and would like to get it at another pharmacy.     Trey White, JillianD

## 2018-12-29 NOTE — PROGRESS NOTES
Critical Care Progress Note    Date of admission  12/28/2018    Chief Complaint  58 y.o. male admitted 12/28/2018 with an acute STEMI.    Hospital Course    This gentleman was admitted to the hospital with an acute STEMI.  He underwent emergent cardiac catheterization with PCI with a MICHOACANO to the mid RCA.      Interval Problem Update  Reviewed last 24 hour events:    SR  BP OK  Cardiac diet  Tele  RA  DAPT      This gentleman is insisting on going home today.  He is leaving AGAINST MEDICAL ADVICE.  He denies any chest pain or pressure.      Review of Systems  Review of Systems   Constitutional: Negative for diaphoresis and fever.   HENT: Negative for nosebleeds.    Eyes: Negative for photophobia.   Respiratory: Negative for cough, sputum production, shortness of breath and stridor.    Cardiovascular: Negative for chest pain, palpitations and orthopnea.   Gastrointestinal: Negative for abdominal pain, nausea and vomiting.   Genitourinary: Negative for hematuria.   Musculoskeletal: Negative for myalgias and neck pain.   Skin: Negative for rash.   Neurological: Negative for speech change, focal weakness, seizures and headaches.   Endo/Heme/Allergies: Does not bruise/bleed easily.   Psychiatric/Behavioral: Negative for suicidal ideas.        Vital Signs for last 24 hours   Temp:  [36 °C (96.8 °F)-36.1 °C (97 °F)] 36 °C (96.8 °F)  Pulse:  [59-80] 80  Resp:  [9-22] 9    Hemodynamic parameters for last 24 hours       Respiratory       Physical Exam   Physical Exam   Constitutional: He is oriented to person, place, and time. No distress.   HENT:   Head: Normocephalic and atraumatic.   Nose: Nose normal.   Mouth/Throat: Oropharynx is clear and moist.   Eyes: Pupils are equal, round, and reactive to light. EOM are normal. Right eye exhibits no discharge. Left eye exhibits no discharge.   Neck: Neck supple. No JVD present. No tracheal deviation present.   Cardiovascular: Intact distal pulses.  Exam reveals no gallop and no  friction rub.    Sinus rhythm   Pulmonary/Chest: He has no wheezes. He has no rales.   Abdominal: Soft. Bowel sounds are normal. He exhibits no distension. There is no tenderness.   Musculoskeletal: He exhibits no tenderness or deformity.   No clubbing or cyanosis   Neurological: He is alert and oriented to person, place, and time. No cranial nerve deficit. Coordination normal.   No focal weakness   Skin: Skin is warm and dry. No rash noted. He is not diaphoretic.       Medications  Current Facility-Administered Medications   Medication Dose Route Frequency Provider Last Rate Last Dose   • aspirin EC (ECOTRIN) tablet 81 mg  81 mg Oral DAILY Norm Garcia M.D.   81 mg at 12/28/18 0545   • buPROPion SR (WELLBUTRIN-SR) tablet 150 mg  150 mg Oral BID Norm Garcia M.D.   150 mg at 12/28/18 0546   • clopidogrel (PLAVIX) tablet 75 mg  75 mg Oral DAILY Norm Garcia M.D.   75 mg at 12/28/18 0545   • gabapentin (NEURONTIN) capsule 300 mg  300 mg Oral TID Norm Garcia M.D.       • QUEtiapine (SEROQUEL) tablet 200 mg  200 mg Oral BID Norm Garcia M.D.   200 mg at 12/28/18 0545   • acetaminophen (TYLENOL) tablet 650 mg  650 mg Oral Q6HRS PRN Norm Garcia M.D.       • cloNIDine (CATAPRES) tablet 0.1 mg  0.1 mg Oral Q6HRS PRN Norm Garcia M.D.       • ondansetron (ZOFRAN) syringe/vial injection 4 mg  4 mg Intravenous Q4HRS PRN Norm Garcia M.D.       • ondansetron (ZOFRAN ODT) dispertab 4 mg  4 mg Oral Q4HRS PRN Norm Garcia M.D.       • promethazine (PHENERGAN) tablet 12.5-25 mg  12.5-25 mg Oral Q4HRS PRN Norm Garcia M.D.       • promethazine (PHENERGAN) suppository 12.5-25 mg  12.5-25 mg Rectal Q4HRS PRN Norm Garcia M.D.       • prochlorperazine (COMPAZINE) injection 5-10 mg  5-10 mg Intravenous Q4HRS PRSAMIRA Garcia M.D.       • senna-docusate (PERICOLACE or SENOKOT S) 8.6-50 MG per tablet 2 Tab  2 Tab Oral BID Norm Garcia M.D.        And   • polyethylene glycol/lytes (MIRALAX) PACKET 1  Packet  1 Packet Oral QDAY PRN Norm Garcia M.D.        And   • magnesium hydroxide (MILK OF MAGNESIA) suspension 30 mL  30 mL Oral QDAY PRN Norm Garcia M.D.        And   • bisacodyl (DULCOLAX) suppository 10 mg  10 mg Rectal QDAY PRN Norm Garcia M.D.       • Respiratory Care per Protocol   Nebulization Continuous RT Norm Garcia M.D.       • metoprolol (LOPRESSOR) tablet 25 mg  25 mg Oral TWICE DAILY Norm Garcia M.D.   25 mg at 12/28/18 1707   • atorvastatin (LIPITOR) tablet 80 mg  80 mg Oral Q EVENING Norm Garcia M.D.       • enoxaparin (LOVENOX) inj 40 mg  40 mg Subcutaneous DAILY Sawyer Cruz M.D.   40 mg at 12/28/18 1146   • NS infusion   Intravenous Continuous Chi Fleming M.D. 75 mL/hr at 12/28/18 1146     • hydrOXYzine pamoate (VISTARIL) capsule 50 mg  50 mg Oral Q12HRS PRN Sawyer Cruz M.D.   50 mg at 12/28/18 1351   • omeprazole (PRILOSEC) capsule 20 mg  20 mg Oral DAILY Rg Robin M.D.   20 mg at 12/28/18 1351   • hyoscyamine-maalox plus-lidocaine viscous (GI COCKTAIL) oral susp 30 mL  30 mL Oral Q6HRS PRN Rg Robin M.D.           Fluids    Intake/Output Summary (Last 24 hours) at 12/29/18 0434  Last data filed at 12/28/18 2200   Gross per 24 hour   Intake           1017.5 ml   Output                0 ml   Net           1017.5 ml       Laboratory      Recent Labs      12/28/18   0125  12/28/18   0558   TROPONINI  0.31*  2.78*   BNPBTYPENAT  21   --      Recent Labs      12/28/18   0125   SODIUM  131*   POTASSIUM  4.0   CHLORIDE  98   CO2  23   BUN  18   CREATININE  1.50*   CALCIUM  8.7     Recent Labs      12/28/18   0125   ALTSGPT  9   ASTSGOT  12   ALKPHOSPHAT  92   TBILIRUBIN  0.3   LIPASE  250*   GLUCOSE  275*     Recent Labs      12/28/18   0125   WBC  9.7   NEUTSPOLYS  72.90*   LYMPHOCYTES  14.10*   MONOCYTES  9.90   EOSINOPHILS  1.60   BASOPHILS  1.10   ASTSGOT  12   ALTSGPT  9   ALKPHOSPHAT  92   TBILIRUBIN  0.3     Recent Labs       12/28/18   0125   RBC  4.13*   HEMOGLOBIN  12.3*   HEMATOCRIT  36.9*   PLATELETCT  526*   PROTHROMBTM  13.1   APTT  30.8   INR  0.98       Imaging  X-Ray:  I have personally reviewed the images and compared with prior images. and My impression is: Clear lungs    Assessment/Plan  * STEMI (ST elevation myocardial infarction) ([facility])   Assessment & Plan    Status post emergent cardiac catheterization with PCI  MICHOACANO to the mid RCA  Continue aspirin, high intensity statin, metoprolol     Psychiatric disorder- (present on admission)   Assessment & Plan    Bipolar anxiety  Continue Wellbutrin     PTSD (post-traumatic stress disorder)- (present on admission)   Assessment & Plan    History of  Continue Wellbutrin SR     Hypertension- (present on admission)   Assessment & Plan    Continue metoprolol, 25 mg twice daily          VTE:  Lovenox  Ulcer: Not Indicated  Lines: None    I have performed a physical exam and reviewed and updated ROS and Plan today (12/29/2018). In review of yesterday's note (12/28/2018), there are no changes except as documented above.     This gentleman is leaving the hospital AGAINST MEDICAL ADVICE.    Discussed patient condition and risk of morbidity and/or mortality with Hospitalist, RN, RT, Pharmacy, Charge nurse / hot rounds and QA team    Sawyer Cruz MD  Pulmonary and Critical Care Medicine

## 2018-12-29 NOTE — DISCHARGE PLANNING
Anticipated Discharge Disposition: Home    Action: LSW spoke with pt at bedside who stated he needs to leave to pay his rent today. Pt states he lives at [address], [place] [address]. LSW attempted to find a number to pt's landlord. However, property is a private owned property. LSW spoke with bedside RN who stated that pt is with the PUFF program through [facility]. LSW called [facility] ([phone]) who stated that they will page the on call worker requesting them to call LSW.     LSW spoke with pt who stated that he does not believe [facility] will call back and he still needs to leave today. LSW informed bedside RN.     Barriers to Discharge: Pt is wanting to leave AMA.     Plan: Awaiting call back from [facility].

## 2018-12-29 NOTE — DISCHARGE INSTRUCTIONS
Discharge Instructions    Discharged to home by taxi with self. Discharged via walking, hospital escort: Refused.  Special equipment needed: Not Applicable    Be sure to schedule a follow-up appointment with your primary care doctor or any specialists as instructed.     Discharge Plan:   Smoking Cessation Offered: Patient Refused  Medication Reconciliation Updated: Yes  Influenza Vaccine Indication: Not indicated: Previously immunized this influenza season and > 8 years of age    I understand that a diet low in cholesterol, fat, and sodium is recommended for good health. Unless I have been given specific instructions below for another diet, I accept this instruction as my diet prescription.   Other diet: Cardiac/Diabetic     Special Instructions: Diagnosis:  Acute Coronary Syndrome (ACS) is a diagnosis that encompasses cardiac-related chest pain and heart attack. ACS occurs when the blood flow to the heart muscle is severely reduced or cut off completely due to a slow process called atherosclerosis.  Atherosclerosis is a disease in which the coronary arteries become narrow from a buildup of fat, cholesterol, and other substances that combine to form plaque. If the plaque breaks, a blood clot will form and block the blood flow to the heart muscle. This lack of blood flow can cause damage or death to the heart muscle which is called a heart attack or Myocardial Infarction (MI). There are two different types of MIs:  ST Elevation Myocardial Infarction or STEMI (the most severe type of heart attack) and Non-ST Elevation Myocardial Infarction or NSTEMI.    Treatment Plan:  · Cardiac Diet  - Low fat, low salt, low cholesterol   · Cardiac Rehab  - Your doctor has ordered you a referral to [facility].  Call [phone] to schedule an appointment.  · Attend my follow-up appointment with my Cardiologist.  · Take my medications as prescribed by my doctor  · Exercise daily  · Control my diabetes    Medications:  Certain  medications are used to treat ACS.  Remember to always take medications as prescribed and never stop talking medications unless told by your doctor.    You have been prescribed the following medicatons:    Aspirin - Aspirin is used as a blood thinning medication and you will require this medication indefinitely.  Anti-platelet/blood thinner - Your Anti-platelet/Blood thinning medication is called Plavix, and is used in combination with aspirin to prevent clots from forming in your heart and/or around your stent.  Your doctor will determine how long you need to be on this medicine.  Beta-Blocker - Beta-Blocker Metoprolol is used to lower blood pressure and heart rate, and/or helps your heart heal after a heart attack.  Statin - Statin Lipitor is used to lower cholesterol.  Nitroglycerine - Nitroglycerine is used to relieve chest pain.    · Is patient discharged on Warfarin / Coumadin?   No     Depression / Suicide Risk    As you are discharged from this [facility] facility, it is important to learn how to keep safe from harming yourself.    Recognize the warning signs:  · Abrupt changes in personality, positive or negative- including increase in energy   · Giving away possessions  · Change in eating patterns- significant weight changes-  positive or negative  · Change in sleeping patterns- unable to sleep or sleeping all the time   · Unwillingness or inability to communicate  · Depression  · Unusual sadness, discouragement and loneliness  · Talk of wanting to die  · Neglect of personal appearance   · Rebelliousness- reckless behavior  · Withdrawal from people/activities they love  · Confusion- inability to concentrate     If you or a loved one observes any of these behaviors or has concerns about self-harm, here's what you can do:  · Talk about it- your feelings and reasons for harming yourself  · Remove any means that you might use to hurt yourself (examples: pills, rope, extension cords, firearm)  · Get  professional help from the community (Mental Health, Substance Abuse, psychological counseling)  · Do not be alone:Call your Safe Contact- someone whom you trust who will be there for you.  · Call your local CRISIS HOTLINE 387-6487 or 683-702-1602  · Call your local Children's Mobile Crisis Response Team [place] (338) 042-0091 or www.myNoticePeriod.com  · Call the toll free National Suicide Prevention Hotlines   · National Suicide Prevention Lifeline 292-231-EUBA (7160)  · National Hope Line Network 800-SUICIDE (355-7787)

## 2018-12-29 NOTE — PROGRESS NOTES
Talking with pt he states that he needs to leave the hospital by 1200 pm. Dr Robin notified and states that Dr Salazar would like the pt to stay 1 more day.

## 2018-12-29 NOTE — PROGRESS NOTES
Pt left AMA. Papers signed, discharge instructions given. Pt understands importance of picking up prescriptions. Pt verbalizes understanding of leaving AMA and associated risks. All belongings accounted for and given to pt.

## 2018-12-29 NOTE — PROGRESS NOTES
· 2 RN skin check complete with Huong RN  · Devices in place: SCDs.  · Skin assessed under devices intact..  · Potential wound found on left digit from prior to arrival per patient. Wound consult placed and wound reported. Pictures taken and entered into EPIC  · The following interventions in place: mepitel one

## 2018-12-29 NOTE — PROGRESS NOTES
Cardiology Follow Up Progress Note    Date of Service  12/29/2018    Attending Physician  Rg Robin M.D.    Chief Complaint   Chest pain.    HPI  Rajiv Sam is a 58 y.o. male admitted 12/28/2018 with the history of coronary artery disease and multiple previous coronary interventions presented with a inferior STEMI with RCA stent.  Noncompliant with dual antiplatelet therapy.    Interim Events  12/28: Denies chest pain.  Taking his medications.    Review of Systems  Review of Systems   Respiratory: Negative for chest tightness and shortness of breath.    Cardiovascular: Negative for chest pain and palpitations.       Vital signs in last 24 hours  Temp:  [35.9 °C (96.7 °F)-36 °C (96.8 °F)] 35.9 °C (96.7 °F)  Pulse:  [69-80] 77  Resp:  [9-28] 19    Physical Exam  Physical Exam   Constitutional: He is oriented to person, place, and time. He appears well-nourished. No distress.   HENT:   Head: Atraumatic.   Eyes: EOM are normal.   Neck: No JVD present.       Cardiovascular: Normal rate, regular rhythm, S1 normal, S2 normal, normal heart sounds and intact distal pulses.  Exam reveals no gallop and no friction rub.    No murmur heard.  Pulmonary/Chest: Effort normal and breath sounds normal. He has no wheezes. He has no rhonchi. He has no rales.   Musculoskeletal: He exhibits no edema.   Cath site without hematoma 2+ right radial pulse.   Neurological: He is alert and oriented to person, place, and time.   Skin: Skin is warm and dry.   Psychiatric: He has a normal mood and affect. His behavior is normal.       Lab Review  Lab Results   Component Value Date/Time    WBC 9.7 12/28/2018 01:25 AM    RBC 4.13 (L) 12/28/2018 01:25 AM    HEMOGLOBIN 12.3 (L) 12/28/2018 01:25 AM    HEMATOCRIT 36.9 (L) 12/28/2018 01:25 AM    MCV 89.3 12/28/2018 01:25 AM    MCH 29.8 12/28/2018 01:25 AM    MCHC 33.3 (L) 12/28/2018 01:25 AM    MPV 8.6 (L) 12/28/2018 01:25 AM      Lab Results   Component Value Date/Time    SODIUM 131 (L)  12/28/2018 01:25 AM    POTASSIUM 4.0 12/28/2018 01:25 AM    CHLORIDE 98 12/28/2018 01:25 AM    CO2 23 12/28/2018 01:25 AM    GLUCOSE 275 (H) 12/28/2018 01:25 AM    BUN 18 12/28/2018 01:25 AM    CREATININE 1.50 (H) 12/28/2018 01:25 AM      Lab Results   Component Value Date/Time    ASTSGOT 12 12/28/2018 01:25 AM    ALTSGPT 9 12/28/2018 01:25 AM     Lab Results   Component Value Date/Time    CHOLSTRLTOT 107 11/04/2018 01:31 AM    LDL 46 11/04/2018 01:31 AM    HDL 43 11/04/2018 01:31 AM    TRIGLYCERIDE 90 11/04/2018 01:31 AM    TROPONINI 2.78 (H) 12/28/2018 05:58 AM       Recent Labs      12/28/18   0125   BNPBTYPENAT  21       Cardiac Imaging and Procedures Review  Rhythm: 12/28 normal sinus rhythm, rate 62.  Reviewed by myself.    ECHOCARDIOGRAM 11/03/2018  Normal left ventricular ejection fraction 70%.  No other abnormalities.    ECHOCARDIOGRAM 12/28/2018  Inferior wall hypokinesis.  Normal left ventricular systolic function.  Left ventricular ejection fraction is visually estimated to be 65%.  Normal left ventricular chamber size.  Trace mitral regurgitation.  Structurally normal aortic valve without significant stenosis or   regurgitation.  Mild tricuspid regurgitation.  Normal pericardium without effusion.  The right ventricle was normal in size and function.    CARDIAC CATHETERIZATION 12/28/2018  Indication for procedure: ST elevation myocardial infarction   Procedure:  · Insertion of 5/6 FR sheath in the right radial artery  · right and left coronary arteriograms  · Left heart catheterization and Left ventriculogram  · Angioplasty and placement of a 3.5 by 28 mm Synergy drug-eluting stent in mid  right coronary artery.    Imaging  Chest X-Ray: 12/28/2018 normal.    Assessment/Plan   Inferior STEMI.  RCA stent.  12/28/2018.  Low blood pressure.  Previous circumflex bifurcation stenting.  2017 and 2018 [facility].  Renal insufficiency worse post coronary angiography.  Conduction  system disease: Right bundle branch block.  Left posterior fascicular block.  Hypertension.  Diabetes mellitus.  Dyslipidemia.  PTSD with bipolar disorder and anxiety.  Noncompliance with medications.    Recommendation Discussion  1. Continue telemetry monitoring.  2.  Continue current cardiac therapy.  3.  Increase physical activity with walking.  4.  Continue IV fluids.  5.  Follow-up BMP.  6.  Discussed with Dr. Rg Luna and RN.    Please contact me with any questions.    Chi Fleming M.D.   Cardiologist, [facility]  [phone]

## 2018-12-29 NOTE — PROGRESS NOTES
"Pt spilled water on his bedsheets while trying to swallow a pill. RN attempted to change sheets but patient stated, \"just leave it. I don't want them changed.\" RN encouraged patient to allow this RN to change linen and provide fresh sheets for him. Pt continued to refuse. The patient did allow this RN to place a towel to try to absorb some of the water.  "

## 2018-12-30 NOTE — DISCHARGE SUMMARY
Discharge Summary    CHIEF COMPLAINT ON ADMISSION  Chief Complaint   Patient presents with   • Chest Pain     Reports ~ 2 days of Chest pain. Hx of Cardiac issues (including MI) and has a Psych Hx   • Anxiety       Reason for Admission  EMS     Admission Date  12/28/2018    CODE STATUS  Full Code    HPI & HOSPITAL COURSE  This is a 58 y.o. male here with chest pain     Mr. Sam has a history of coronary artery disease, hypertension, and psychiatric disorder.  He presented to the emergency room on 12/20/2018 with a chief complaint of chest pain.  EKG demonstrated ST segment elevation in the patient was taken emergently to the Cath Lab.  He had angioplasty and placement of a drug-eluting stent in the mid right coronary artery.  Patient was transferred to the cardiac ICU after the procedure.  Chest pain resolved.  Patient does have a history of psychiatric disorder however during my encounters on 12/28 and 12/29/2018 he expressed no concerning symptoms of depressed mood or suicidality.  On 12/29/2018 the patient indicated to me that he was going to leave the hospital AGAINST MEDICAL ADVICE.  I discussed the situation with cardiology.  Dr. Salazar felt that the patient should remain in the hospital for an additional 24 hours as he was post STEMI and had recent PCI, cardiac monitoring was appropriate for this time period.  I then discussed with the patient that it was a firm recommendation of the care team to stay for another 24-hours.  The patient chose to leave the hospital AGAINST MEDICAL ADVICE, I discussed the importance of taking Plavix and aspirin with him.  He indicated to me that he would go to [facility] to  his prescriptions and that he had funds to afford them.    Therefore, he is discharged in guarded and stable condition against medcial advice.    The patient did not meet the 2 midnight rule because he is leaving the hospital AGAINST MEDICAL ADVICE    Discharge Date  12/29/2018    FOLLOW UP ITEMS  POST DISCHARGE  Follow-up with cardiology and PCP    DISCHARGE DIAGNOSES  Principal Problem:    STEMI (ST elevation myocardial infarction) (Former[facility]) POA: Unknown  Active Problems:    Hypertension POA: Yes    PTSD (post-traumatic stress disorder) POA: Yes    Psychiatric disorder POA: Yes  Resolved Problems:    * No resolved hospital problems. *      FOLLOW UP  No future appointments.  Call your cardiologist to make a follow up appointment.           Pcp Pt States None            MEDICATIONS ON DISCHARGE     Medication List      START taking these medications      Instructions   omeprazole 20 MG delayed-release capsule  Commonly known as:  PRILOSEC   Take 1 Cap by mouth every day.  Dose:  20 mg        CHANGE how you take these medications      Instructions   atorvastatin 80 MG tablet  What changed:  · medication strength  · how much to take  · when to take this  Commonly known as:  LIPITOR   Take 1 Tab by mouth every evening.  Dose:  80 mg     nitroglycerin 0.4 MG Subl  What changed:  when to take this  Commonly known as:  NITROSTAT   Place 1 Tab under tongue as needed for Chest Pain.  Dose:  0.4 mg        CONTINUE taking these medications      Instructions   aspirin EC 81 MG Tbec  Commonly known as:  ECOTRIN   Take 1 Tab by mouth every day.  Dose:  81 mg     buPROPion  MG Tb12 sustained-release tablet  Commonly known as:  WELLBUTRIN-SR   Take 150 mg by mouth every day. 0900,1400  Dose:  150 mg     clopidogrel 75 MG Tabs  Commonly known as:  PLAVIX   Take 1 Tab by mouth every day.  Dose:  75 mg     gabapentin 300 MG Caps  Commonly known as:  NEURONTIN   Take 1 Cap by mouth 3 times a day.  Dose:  300 mg     hydrOXYzine HCl 25 MG Tabs  Commonly known as:  ATARAX   Take 25-50 mg by mouth 4 times a day.  Dose:  25-50 mg     metformin 1000 MG tablet  Commonly known as:  GLUCOPHAGE   Take 1 Tab by mouth 2 times a day, with meals.  Dose:  1000 mg     metoprolol 25 MG Tabs  Commonly known as:  LOPRESSOR   Take 1 Tab by  mouth 2 times a day.  Dose:  25 mg     QUEtiapine 200 MG Tabs  Commonly known as:  SEROQUEL   Take 1 Tab by mouth 2 Times a Day.  Dose:  200 mg     VITAMIN B-12 PO   Take 1 Tab by mouth every morning.  Dose:  1 Tab        STOP taking these medications    lisinopril 20 MG Tabs  Commonly known as:  PRINIVIL     prazosin 1 MG Caps  Commonly known as:  MINIPRESS     STRESS FORMULA 500/ZINC PO            Allergies  Allergies   Allergen Reactions   • Pcn [Penicillins] Rash     Rash       DIET  No orders of the defined types were placed in this encounter.      ACTIVITY  As tolerated.  Weight bearing as tolerated    CONSULTATIONS  Cardiology  Critical Care    PROCEDURES  Cardiac Cath 12/29/18:  · Insertion of 5/6 FR sheath in the right radial artery  · right and left coronary arteriograms  · Left heart catheterization and Left ventriculogram  · Angioplasty and placement of a 3.5 by 28 mm Synergy drug-eluting stent in mid  right coronary artery.    LABORATORY  Lab Results   Component Value Date    SODIUM 131 (L) 12/28/2018    POTASSIUM 4.0 12/28/2018    CHLORIDE 98 12/28/2018    CO2 23 12/28/2018    GLUCOSE 275 (H) 12/28/2018    BUN 18 12/28/2018    CREATININE 1.50 (H) 12/28/2018        Lab Results   Component Value Date    WBC 9.7 12/28/2018    HEMOGLOBIN 12.3 (L) 12/28/2018    HEMATOCRIT 36.9 (L) 12/28/2018    PLATELETCT 526 (H) 12/28/2018        Total time of the discharge process exceeds 50 minutes including extensive discussion on the risks of leaving AGAINST MEDICAL ADVICE and the need for dual antiplatelet therapy

## 2019-01-31 LAB — EKG IMPRESSION: NORMAL

## 2019-02-13 ENCOUNTER — HOSPITAL ENCOUNTER (OUTPATIENT)
Facility: MEDICAL CENTER | Age: 59
End: 2019-02-19
Attending: EMERGENCY MEDICINE | Admitting: INTERNAL MEDICINE
Payer: MEDICARE

## 2019-02-13 ENCOUNTER — APPOINTMENT (OUTPATIENT)
Dept: RADIOLOGY | Facility: MEDICAL CENTER | Age: 59
End: 2019-02-13
Attending: EMERGENCY MEDICINE
Payer: MEDICARE

## 2019-02-13 DIAGNOSIS — I10 HYPERTENSION, UNSPECIFIED TYPE: ICD-10-CM

## 2019-02-13 DIAGNOSIS — R07.9 CHEST PAIN, UNSPECIFIED TYPE: ICD-10-CM

## 2019-02-13 DIAGNOSIS — R45.851 SUICIDAL IDEATION: ICD-10-CM

## 2019-02-13 PROBLEM — Z72.0 TOBACCO ABUSE: Status: ACTIVE | Noted: 2019-02-13

## 2019-02-13 LAB
ALBUMIN SERPL BCP-MCNC: 4 G/DL (ref 3.2–4.9)
ALBUMIN/GLOB SERPL: 1.6 G/DL
ALP SERPL-CCNC: 63 U/L (ref 30–99)
ALT SERPL-CCNC: 13 U/L (ref 2–50)
AMPHET UR QL SCN: POSITIVE
ANION GAP SERPL CALC-SCNC: 8 MMOL/L (ref 0–11.9)
APTT PPP: 29.9 SEC (ref 24.7–36)
AST SERPL-CCNC: 23 U/L (ref 12–45)
BARBITURATES UR QL SCN: NEGATIVE
BASOPHILS # BLD AUTO: 0.9 % (ref 0–1.8)
BASOPHILS # BLD: 0.06 K/UL (ref 0–0.12)
BENZODIAZ UR QL SCN: NEGATIVE
BILIRUB SERPL-MCNC: 0.5 MG/DL (ref 0.1–1.5)
BNP SERPL-MCNC: 98 PG/ML (ref 0–100)
BUN SERPL-MCNC: 12 MG/DL (ref 8–22)
BZE UR QL SCN: NEGATIVE
CALCIUM SERPL-MCNC: 9.1 MG/DL (ref 8.5–10.5)
CANNABINOIDS UR QL SCN: POSITIVE
CHLORIDE SERPL-SCNC: 97 MMOL/L (ref 96–112)
CO2 SERPL-SCNC: 25 MMOL/L (ref 20–33)
CREAT SERPL-MCNC: 1.23 MG/DL (ref 0.5–1.4)
EKG IMPRESSION: NORMAL
EOSINOPHIL # BLD AUTO: 0.1 K/UL (ref 0–0.51)
EOSINOPHIL NFR BLD: 1.5 % (ref 0–6.9)
ERYTHROCYTE [DISTWIDTH] IN BLOOD BY AUTOMATED COUNT: 51 FL (ref 35.9–50)
EST. AVERAGE GLUCOSE BLD GHB EST-MCNC: 189 MG/DL
GLOBULIN SER CALC-MCNC: 2.5 G/DL (ref 1.9–3.5)
GLUCOSE BLD-MCNC: 220 MG/DL (ref 65–99)
GLUCOSE SERPL-MCNC: 227 MG/DL (ref 65–99)
HBA1C MFR BLD: 8.2 % (ref 0–5.6)
HCT VFR BLD AUTO: 38.2 % (ref 42–52)
HGB BLD-MCNC: 12.5 G/DL (ref 14–18)
IMM GRANULOCYTES # BLD AUTO: 0.01 K/UL (ref 0–0.11)
IMM GRANULOCYTES NFR BLD AUTO: 0.1 % (ref 0–0.9)
INR PPP: 0.96 (ref 0.87–1.13)
LIPASE SERPL-CCNC: 56 U/L (ref 11–82)
LYMPHOCYTES # BLD AUTO: 1.08 K/UL (ref 1–4.8)
LYMPHOCYTES NFR BLD: 15.7 % (ref 22–41)
MAGNESIUM SERPL-MCNC: 2.1 MG/DL (ref 1.5–2.5)
MCH RBC QN AUTO: 28.5 PG (ref 27–33)
MCHC RBC AUTO-ENTMCNC: 32.7 G/DL (ref 33.7–35.3)
MCV RBC AUTO: 87 FL (ref 81.4–97.8)
METHADONE UR QL SCN: NEGATIVE
MONOCYTES # BLD AUTO: 0.68 K/UL (ref 0–0.85)
MONOCYTES NFR BLD AUTO: 9.9 % (ref 0–13.4)
NEUTROPHILS # BLD AUTO: 4.93 K/UL (ref 1.82–7.42)
NEUTROPHILS NFR BLD: 71.9 % (ref 44–72)
NRBC # BLD AUTO: 0 K/UL
NRBC BLD-RTO: 0 /100 WBC
OPIATES UR QL SCN: NEGATIVE
OXYCODONE UR QL SCN: NEGATIVE
PCP UR QL SCN: NEGATIVE
PLATELET # BLD AUTO: 314 K/UL (ref 164–446)
PMV BLD AUTO: 9.3 FL (ref 9–12.9)
POC BREATHALIZER: 0 PERCENT (ref 0–0.01)
POTASSIUM SERPL-SCNC: 3.7 MMOL/L (ref 3.6–5.5)
PROPOXYPH UR QL SCN: NEGATIVE
PROT SERPL-MCNC: 6.5 G/DL (ref 6–8.2)
PROTHROMBIN TIME: 12.9 SEC (ref 12–14.6)
RBC # BLD AUTO: 4.39 M/UL (ref 4.7–6.1)
SODIUM SERPL-SCNC: 130 MMOL/L (ref 135–145)
TROPONIN I SERPL-MCNC: 0.01 NG/ML (ref 0–0.04)
TROPONIN I SERPL-MCNC: 0.01 NG/ML (ref 0–0.04)
TSH SERPL DL<=0.005 MIU/L-ACNC: 2.31 UIU/ML (ref 0.38–5.33)
WBC # BLD AUTO: 6.9 K/UL (ref 4.8–10.8)

## 2019-02-13 PROCEDURE — G0378 HOSPITAL OBSERVATION PER HR: HCPCS

## 2019-02-13 PROCEDURE — 83880 ASSAY OF NATRIURETIC PEPTIDE: CPT

## 2019-02-13 PROCEDURE — 83735 ASSAY OF MAGNESIUM: CPT

## 2019-02-13 PROCEDURE — 700102 HCHG RX REV CODE 250 W/ 637 OVERRIDE(OP): Performed by: PSYCHIATRY & NEUROLOGY

## 2019-02-13 PROCEDURE — 96375 TX/PRO/DX INJ NEW DRUG ADDON: CPT

## 2019-02-13 PROCEDURE — 71045 X-RAY EXAM CHEST 1 VIEW: CPT

## 2019-02-13 PROCEDURE — 36415 COLL VENOUS BLD VENIPUNCTURE: CPT

## 2019-02-13 PROCEDURE — 302970 POC BREATHALIZER: Performed by: EMERGENCY MEDICINE

## 2019-02-13 PROCEDURE — 83036 HEMOGLOBIN GLYCOSYLATED A1C: CPT

## 2019-02-13 PROCEDURE — 82962 GLUCOSE BLOOD TEST: CPT

## 2019-02-13 PROCEDURE — 93005 ELECTROCARDIOGRAM TRACING: CPT | Performed by: EMERGENCY MEDICINE

## 2019-02-13 PROCEDURE — 93010 ELECTROCARDIOGRAM REPORT: CPT | Performed by: INTERNAL MEDICINE

## 2019-02-13 PROCEDURE — 700101 HCHG RX REV CODE 250: Performed by: EMERGENCY MEDICINE

## 2019-02-13 PROCEDURE — A9270 NON-COVERED ITEM OR SERVICE: HCPCS | Performed by: PSYCHIATRY & NEUROLOGY

## 2019-02-13 PROCEDURE — 99407 BEHAV CHNG SMOKING > 10 MIN: CPT | Performed by: INTERNAL MEDICINE

## 2019-02-13 PROCEDURE — 700102 HCHG RX REV CODE 250 W/ 637 OVERRIDE(OP): Performed by: EMERGENCY MEDICINE

## 2019-02-13 PROCEDURE — 700102 HCHG RX REV CODE 250 W/ 637 OVERRIDE(OP): Performed by: INTERNAL MEDICINE

## 2019-02-13 PROCEDURE — 80307 DRUG TEST PRSMV CHEM ANLYZR: CPT

## 2019-02-13 PROCEDURE — 85730 THROMBOPLASTIN TIME PARTIAL: CPT

## 2019-02-13 PROCEDURE — 99220 PR INITIAL OBSERVATION CARE,LEVL III: CPT | Mod: 25 | Performed by: INTERNAL MEDICINE

## 2019-02-13 PROCEDURE — A9270 NON-COVERED ITEM OR SERVICE: HCPCS | Performed by: EMERGENCY MEDICINE

## 2019-02-13 PROCEDURE — 83690 ASSAY OF LIPASE: CPT

## 2019-02-13 PROCEDURE — 96374 THER/PROPH/DIAG INJ IV PUSH: CPT

## 2019-02-13 PROCEDURE — 80053 COMPREHEN METABOLIC PANEL: CPT

## 2019-02-13 PROCEDURE — 99285 EMERGENCY DEPT VISIT HI MDM: CPT

## 2019-02-13 PROCEDURE — 84443 ASSAY THYROID STIM HORMONE: CPT

## 2019-02-13 PROCEDURE — 94760 N-INVAS EAR/PLS OXIMETRY 1: CPT

## 2019-02-13 PROCEDURE — 84484 ASSAY OF TROPONIN QUANT: CPT

## 2019-02-13 PROCEDURE — 85025 COMPLETE CBC W/AUTO DIFF WBC: CPT

## 2019-02-13 PROCEDURE — 93005 ELECTROCARDIOGRAM TRACING: CPT | Performed by: INTERNAL MEDICINE

## 2019-02-13 PROCEDURE — A9270 NON-COVERED ITEM OR SERVICE: HCPCS | Performed by: INTERNAL MEDICINE

## 2019-02-13 PROCEDURE — 85610 PROTHROMBIN TIME: CPT

## 2019-02-13 RX ORDER — POLYETHYLENE GLYCOL 3350 17 G/17G
1 POWDER, FOR SOLUTION ORAL
Status: DISCONTINUED | OUTPATIENT
Start: 2019-02-13 | End: 2019-02-19 | Stop reason: HOSPADM

## 2019-02-13 RX ORDER — ATORVASTATIN CALCIUM 80 MG/1
80 TABLET, FILM COATED ORAL EVERY EVENING
Status: DISCONTINUED | OUTPATIENT
Start: 2019-02-13 | End: 2019-02-19 | Stop reason: HOSPADM

## 2019-02-13 RX ORDER — OMEPRAZOLE 20 MG/1
20 CAPSULE, DELAYED RELEASE ORAL DAILY
Status: DISCONTINUED | OUTPATIENT
Start: 2019-02-13 | End: 2019-02-19 | Stop reason: HOSPADM

## 2019-02-13 RX ORDER — QUETIAPINE FUMARATE 200 MG/1
200 TABLET, FILM COATED ORAL 2 TIMES DAILY
Status: DISCONTINUED | OUTPATIENT
Start: 2019-02-13 | End: 2019-02-13

## 2019-02-13 RX ORDER — NITROGLYCERIN 0.4 MG/1
0.4 TABLET SUBLINGUAL PRN
Status: DISCONTINUED | OUTPATIENT
Start: 2019-02-13 | End: 2019-02-19 | Stop reason: HOSPADM

## 2019-02-13 RX ORDER — ONDANSETRON 2 MG/ML
4 INJECTION INTRAMUSCULAR; INTRAVENOUS EVERY 4 HOURS PRN
Status: DISCONTINUED | OUTPATIENT
Start: 2019-02-13 | End: 2019-02-19 | Stop reason: HOSPADM

## 2019-02-13 RX ORDER — BISACODYL 10 MG
10 SUPPOSITORY, RECTAL RECTAL
Status: DISCONTINUED | OUTPATIENT
Start: 2019-02-13 | End: 2019-02-19 | Stop reason: HOSPADM

## 2019-02-13 RX ORDER — METOPROLOL TARTRATE 1 MG/ML
5 INJECTION, SOLUTION INTRAVENOUS
Status: DISCONTINUED | OUTPATIENT
Start: 2019-02-13 | End: 2019-02-18

## 2019-02-13 RX ORDER — PRAZOSIN HYDROCHLORIDE 2 MG/1
2 CAPSULE ORAL NIGHTLY
Status: DISCONTINUED | OUTPATIENT
Start: 2019-02-13 | End: 2019-02-19 | Stop reason: HOSPADM

## 2019-02-13 RX ORDER — METOPROLOL TARTRATE 1 MG/ML
5 INJECTION, SOLUTION INTRAVENOUS
Status: DISCONTINUED | OUTPATIENT
Start: 2019-02-13 | End: 2019-02-19 | Stop reason: HOSPADM

## 2019-02-13 RX ORDER — ONDANSETRON 4 MG/1
4 TABLET, ORALLY DISINTEGRATING ORAL EVERY 4 HOURS PRN
Status: DISCONTINUED | OUTPATIENT
Start: 2019-02-13 | End: 2019-02-19 | Stop reason: HOSPADM

## 2019-02-13 RX ORDER — BUPROPION HYDROCHLORIDE 150 MG/1
150 TABLET, EXTENDED RELEASE ORAL 2 TIMES DAILY
Status: DISCONTINUED | OUTPATIENT
Start: 2019-02-13 | End: 2019-02-17

## 2019-02-13 RX ORDER — LISINOPRIL 20 MG/1
40 TABLET ORAL DAILY
Status: DISCONTINUED | OUTPATIENT
Start: 2019-02-13 | End: 2019-02-19 | Stop reason: HOSPADM

## 2019-02-13 RX ORDER — CLOPIDOGREL BISULFATE 75 MG/1
75 TABLET ORAL DAILY
Status: DISCONTINUED | OUTPATIENT
Start: 2019-02-13 | End: 2019-02-19 | Stop reason: HOSPADM

## 2019-02-13 RX ORDER — PRAZOSIN HYDROCHLORIDE 2 MG/1
2 CAPSULE ORAL NIGHTLY
COMMUNITY

## 2019-02-13 RX ORDER — LISINOPRIL 40 MG/1
40 TABLET ORAL DAILY
COMMUNITY

## 2019-02-13 RX ORDER — LORAZEPAM 1 MG/1
1 TABLET ORAL ONCE
Status: COMPLETED | OUTPATIENT
Start: 2019-02-13 | End: 2019-02-13

## 2019-02-13 RX ORDER — OLANZAPINE 5 MG/1
5 TABLET ORAL EVERY EVENING
Status: DISCONTINUED | OUTPATIENT
Start: 2019-02-13 | End: 2019-02-19

## 2019-02-13 RX ORDER — ACETAMINOPHEN 325 MG/1
650 TABLET ORAL EVERY 6 HOURS PRN
Status: DISCONTINUED | OUTPATIENT
Start: 2019-02-13 | End: 2019-02-19 | Stop reason: HOSPADM

## 2019-02-13 RX ORDER — AMOXICILLIN 250 MG
2 CAPSULE ORAL 2 TIMES DAILY
Status: DISCONTINUED | OUTPATIENT
Start: 2019-02-13 | End: 2019-02-19 | Stop reason: HOSPADM

## 2019-02-13 RX ORDER — NITROGLYCERIN 0.4 MG/1
0.4 TABLET SUBLINGUAL ONCE
Status: COMPLETED | OUTPATIENT
Start: 2019-02-13 | End: 2019-02-13

## 2019-02-13 RX ORDER — ENALAPRILAT 1.25 MG/ML
1.25 INJECTION INTRAVENOUS EVERY 6 HOURS PRN
Status: DISCONTINUED | OUTPATIENT
Start: 2019-02-13 | End: 2019-02-19 | Stop reason: HOSPADM

## 2019-02-13 RX ADMIN — LORAZEPAM 1 MG: 1 TABLET ORAL at 09:21

## 2019-02-13 RX ADMIN — ATORVASTATIN CALCIUM 80 MG: 80 TABLET, FILM COATED ORAL at 17:45

## 2019-02-13 RX ADMIN — LISINOPRIL 40 MG: 20 TABLET ORAL at 10:31

## 2019-02-13 RX ADMIN — BUPROPION HYDROCHLORIDE 150 MG: 150 TABLET, FILM COATED, EXTENDED RELEASE ORAL at 10:30

## 2019-02-13 RX ADMIN — OMEPRAZOLE 20 MG: 20 CAPSULE, DELAYED RELEASE ORAL at 10:31

## 2019-02-13 RX ADMIN — CLOPIDOGREL 75 MG: 75 TABLET, FILM COATED ORAL at 10:31

## 2019-02-13 RX ADMIN — METOPROLOL TARTRATE 25 MG: 25 TABLET, FILM COATED ORAL at 10:32

## 2019-02-13 RX ADMIN — BUPROPION HYDROCHLORIDE 150 MG: 150 TABLET, FILM COATED, EXTENDED RELEASE ORAL at 17:45

## 2019-02-13 RX ADMIN — PRAZOSIN HYDROCHLORIDE 2 MG: 2 CAPSULE ORAL at 22:02

## 2019-02-13 RX ADMIN — QUETIAPINE FUMARATE 200 MG: 100 TABLET ORAL at 10:32

## 2019-02-13 RX ADMIN — METOPROLOL TARTRATE 25 MG: 25 TABLET, FILM COATED ORAL at 17:45

## 2019-02-13 RX ADMIN — METOPROLOL TARTRATE 5 MG: 1 INJECTION, SOLUTION INTRAVENOUS at 09:53

## 2019-02-13 RX ADMIN — NITROGLYCERIN 0.4 MG: 0.4 TABLET SUBLINGUAL at 09:15

## 2019-02-13 RX ADMIN — OLANZAPINE 5 MG: 5 TABLET, FILM COATED ORAL at 22:02

## 2019-02-13 RX ADMIN — NITROGLYCERIN 1 INCH: 20 OINTMENT TOPICAL at 09:15

## 2019-02-13 ASSESSMENT — COGNITIVE AND FUNCTIONAL STATUS - GENERAL
SUGGESTED CMS G CODE MODIFIER MOBILITY: CJ
CLIMB 3 TO 5 STEPS WITH RAILING: A LITTLE
SUGGESTED CMS G CODE MODIFIER DAILY ACTIVITY: CH
WALKING IN HOSPITAL ROOM: A LITTLE
MOBILITY SCORE: 22
DAILY ACTIVITIY SCORE: 24

## 2019-02-13 ASSESSMENT — ENCOUNTER SYMPTOMS
BLOOD IN STOOL: 0
BACK PAIN: 0
DIAPHORESIS: 0
WHEEZING: 0
SHORTNESS OF BREATH: 1
COUGH: 0
MYALGIAS: 0
PALPITATIONS: 0
FLANK PAIN: 0
VOMITING: 0
SEIZURES: 0
NECK PAIN: 0
DIARRHEA: 0
FOCAL WEAKNESS: 0
DIZZINESS: 0
SPUTUM PRODUCTION: 0
ABDOMINAL PAIN: 0
BRUISES/BLEEDS EASILY: 0
HEADACHES: 0
BLURRED VISION: 0
SORE THROAT: 0
CHILLS: 0
FEVER: 0
NAUSEA: 0

## 2019-02-13 ASSESSMENT — LIFESTYLE VARIABLES
EVER_SMOKED: YES
HAVE YOU EVER FELT YOU SHOULD CUT DOWN ON YOUR DRINKING: NO
ALCOHOL_USE: YES
EVER HAD A DRINK FIRST THING IN THE MORNING TO STEADY YOUR NERVES TO GET RID OF A HANGOVER: NO
TOTAL SCORE: 0
EVER FELT BAD OR GUILTY ABOUT YOUR DRINKING: NO
AVERAGE NUMBER OF DAYS PER WEEK YOU HAVE A DRINK CONTAINING ALCOHOL: 1
EVER_SMOKED: YES
HAVE YOU EVER FELT YOU SHOULD CUT DOWN ON YOUR DRINKING: NO
ALCOHOL_USE: YES
CONSUMPTION TOTAL: INCOMPLETE
TOTAL SCORE: 0
EVER HAD A DRINK FIRST THING IN THE MORNING TO STEADY YOUR NERVES TO GET RID OF A HANGOVER: NO
TOTAL SCORE: 0
HOW MANY TIMES IN THE PAST YEAR HAVE YOU HAD 5 OR MORE DRINKS IN A DAY: 0
TOTAL SCORE: 0
TOTAL SCORE: 0
HAVE PEOPLE ANNOYED YOU BY CRITICIZING YOUR DRINKING: NO
CONSUMPTION TOTAL: NEGATIVE
HAVE PEOPLE ANNOYED YOU BY CRITICIZING YOUR DRINKING: NO
TOTAL SCORE: 0
EVER FELT BAD OR GUILTY ABOUT YOUR DRINKING: NO
ON A TYPICAL DAY WHEN YOU DRINK ALCOHOL HOW MANY DRINKS DO YOU HAVE: 1

## 2019-02-13 ASSESSMENT — PATIENT HEALTH QUESTIONNAIRE - PHQ9
6. FEELING BAD ABOUT YOURSELF - OR THAT YOU ARE A FAILURE OR HAVE LET YOURSELF OR YOUR FAMILY DOWN: NOT AL ALL
1. LITTLE INTEREST OR PLEASURE IN DOING THINGS: NEARLY EVERY DAY
4. FEELING TIRED OR HAVING LITTLE ENERGY: MORE THAN HALF THE DAYS
7. TROUBLE CONCENTRATING ON THINGS, SUCH AS READING THE NEWSPAPER OR WATCHING TELEVISION: SEVERAL DAYS
2. FEELING DOWN, DEPRESSED, IRRITABLE, OR HOPELESS: NEARLY EVERY DAY
3. TROUBLE FALLING OR STAYING ASLEEP OR SLEEPING TOO MUCH: NEARLY EVERY DAY
SUM OF ALL RESPONSES TO PHQ QUESTIONS 1-9: 14
SUM OF ALL RESPONSES TO PHQ9 QUESTIONS 1 AND 2: 6
9. THOUGHTS THAT YOU WOULD BE BETTER OFF DEAD, OR OF HURTING YOURSELF: MORE THAN HALF THE DAYS
5. POOR APPETITE OR OVEREATING: NOT AT ALL
8. MOVING OR SPEAKING SO SLOWLY THAT OTHER PEOPLE COULD HAVE NOTICED. OR THE OPPOSITE, BEING SO FIGETY OR RESTLESS THAT YOU HAVE BEEN MOVING AROUND A LOT MORE THAN USUAL: NOT AT ALL

## 2019-02-13 ASSESSMENT — COPD QUESTIONNAIRES
DURING THE PAST 4 WEEKS HOW MUCH DID YOU FEEL SHORT OF BREATH: SOME OF THE TIME
DO YOU EVER COUGH UP ANY MUCUS OR PHLEGM?: YES, A FEW DAYS A WEEK OR MONTH
HAVE YOU SMOKED AT LEAST 100 CIGARETTES IN YOUR ENTIRE LIFE: YES
COPD SCREENING SCORE: 5

## 2019-02-13 NOTE — ASSESSMENT & PLAN NOTE
Patient had a STEMI in December 2018 for which he underwent stent placement to the RCA  -resolved  Suspect secondary to uncontrolled hypertension  -no aCS, likely 2/2 stress, possibly MSK  -consider discontinuing TELE tomorrow  -stop checking troponins, no CP currently  -continue all current cardiac medications

## 2019-02-13 NOTE — ED NOTES
Patient placed on legal hold and protocol ordered. [employer] ([facility]) notified. Security notified of belonging search. Medically necessary equipment left in room for CP workup. Patient calm and cooperative. Discussed safe care with patient. Sitter outside room for 1:1 obs.

## 2019-02-13 NOTE — ASSESSMENT & PLAN NOTE
-remains well controlled  Continue lisinopril, metoprolol and Minipress  IV metoprolol and Vasotec as needed for SBP greater than 160

## 2019-02-13 NOTE — ED NOTES
Med Rec complete per Pt at bedside  Allergies Reviewed  No ABX in the last 30 days    Pt reports taking X5 Nitrostat over the course of 6 hours (starting at 1630) before coming to the hospital.

## 2019-02-13 NOTE — ED PROVIDER NOTES
ED Provider Note    CHIEF COMPLAINT  Chief Complaint   Patient presents with   • Chest Pain   • Suicidal Ideation       HPI  Rajiv Sam is a 58 y.o. male who presents with 2 complaints.  He had a 6-hour episode of anterior chest pain starting at 4:30 PM yesterday.  He states his new girlfriend has been talking to his old girlfriend which is stressing him out.  He began to feel despondent, [place] today began to think of jumping off the roof to kill himself.  His chest pain is almost completely resolved.  He states he took 5 nitro's throughout the evening eventually most of the pain relieved.  He recently had his lisinopril increased secondary to high blood pressure.  He states the best blood pressure is had was yesterday at 145/90.  Patient presents hypertensive here.  No cough, no shortness of breath, no syncope.  No alcohol use, with negative breathalyzer on arrival    Patient had acute STEMI back in December with stent placement.  He states he is compliant on his medications    REVIEW OF SYSTEMS    Constitutional: Denies fever  Respiratory: No shortness of breath  Cardiac: Chest pain  Gastrointestinal: No abdominal pain  Musculoskeletal: No acute neck or back pain  Neurologic: No headache  Psychiatric: Suicidal ideation with plan to jump off buildings       All other systems are negative.       PAST MEDICAL HISTORY  Past Medical History:   Diagnosis Date   • Hypertension    • Old MI (myocardial infarction)    • Psychiatric disorder     depression    • PTSD (post-traumatic stress disorder)        FAMILY HISTORY  Family History   Problem Relation Age of Onset   • Cancer Mother        SOCIAL HISTORY  Social History     Social History   • Marital status: Single     Spouse name: N/A   • Number of children: N/A   • Years of education: N/A     Social History Main Topics   • Smoking status: Current Every Day Smoker   • Smokeless tobacco: Never Used   • Alcohol use No   • Drug use: Yes     Types:  "Inhaled      Comment: pot   • Sexual activity: Not on file     Other Topics Concern   • Not on file     Social History Narrative   • No narrative on file       SURGICAL HISTORY  Past Surgical History:   Procedure Laterality Date   • CARDIAC CATH, LEFT HEART         CURRENT MEDICATIONS  Home Medications     Reviewed by Aruna Alfonso R.N. (Registered Nurse) on 02/13/19 at 0823  Med List Status: Partial   Medication Last Dose Status   aspirin EC (ECOTRIN) 81 MG Tablet Delayed Response  Active   atorvastatin (LIPITOR) 80 MG tablet  Active   buPROPion SR (WELLBUTRIN-SR) 150 MG TABLET SR 12 HR sustained-release tablet  Active   clopidogrel (PLAVIX) 75 MG Tab  Active   Cyanocobalamin (VITAMIN B-12 PO)  Active   gabapentin (NEURONTIN) 300 MG Cap  Active   hydrOXYzine HCl (ATARAX) 25 MG Tab  Active   metformin (GLUCOPHAGE) 1000 MG tablet  Active   metoprolol (LOPRESSOR) 25 MG Tab  Active   nitroglycerin (NITROSTAT) 0.4 MG SL Tab  Active   omeprazole (PRILOSEC) 20 MG delayed-release capsule  Active   QUEtiapine (SEROQUEL) 200 MG Tab  Active                ALLERGIES  Allergies   Allergen Reactions   • Pcn [Penicillins] Rash     Rash       PHYSICAL EXAM  VITAL SIGNS: BP (!) 161/104   Pulse 95   Temp 36.8 °C (98.2 °F) (Tympanic)   Resp 18   Ht 1.626 m (5' 4\")   Wt 59 kg (130 lb)   SpO2 100%   BMI 22.31 kg/m²   Constitutional:  Non-toxic appearance.   HENT: No facial swelling, no epistaxis  Eyes: Anicteric, no conjunctivitis.  Pupils are 3 mm bilateral  Cardiovascular: Normal heart rate, Normal rhythm, no murmur  Pulmonary:  No wheezing, No rales.   Gastrointestinal: Soft, mild epigastric tenderness, No masses  Skin: Warm, Dry, No cyanosis.   Neurologic: Speech is clear, follows commands, facial expression is symmetrical.  Psychiatric: Depressed mood, cooperative  Musculoskeletal: No chest wall tenderness    EKG/Labs  Results for orders placed or performed during the hospital encounter of 02/13/19   Troponin   Result " Value Ref Range    Troponin I 0.01 0.00 - 0.04 ng/mL   Btype Natriuretic Peptide   Result Value Ref Range    B Natriuretic Peptide 98 0 - 100 pg/mL   CBC with Differential   Result Value Ref Range    WBC 6.9 4.8 - 10.8 K/uL    RBC 4.39 (L) 4.70 - 6.10 M/uL    Hemoglobin 12.5 (L) 14.0 - 18.0 g/dL    Hematocrit 38.2 (L) 42.0 - 52.0 %    MCV 87.0 81.4 - 97.8 fL    MCH 28.5 27.0 - 33.0 pg    MCHC 32.7 (L) 33.7 - 35.3 g/dL    RDW 51.0 (H) 35.9 - 50.0 fL    Platelet Count 314 164 - 446 K/uL    MPV 9.3 9.0 - 12.9 fL    Neutrophils-Polys 71.90 44.00 - 72.00 %    Lymphocytes 15.70 (L) 22.00 - 41.00 %    Monocytes 9.90 0.00 - 13.40 %    Eosinophils 1.50 0.00 - 6.90 %    Basophils 0.90 0.00 - 1.80 %    Immature Granulocytes 0.10 0.00 - 0.90 %    Nucleated RBC 0.00 /100 WBC    Neutrophils (Absolute) 4.93 1.82 - 7.42 K/uL    Lymphs (Absolute) 1.08 1.00 - 4.80 K/uL    Monos (Absolute) 0.68 0.00 - 0.85 K/uL    Eos (Absolute) 0.10 0.00 - 0.51 K/uL    Baso (Absolute) 0.06 0.00 - 0.12 K/uL    Immature Granulocytes (abs) 0.01 0.00 - 0.11 K/uL    NRBC (Absolute) 0.00 K/uL   Complete Metabolic Panel (CMP)   Result Value Ref Range    Sodium 130 (L) 135 - 145 mmol/L    Potassium 3.7 3.6 - 5.5 mmol/L    Chloride 97 96 - 112 mmol/L    Co2 25 20 - 33 mmol/L    Anion Gap 8.0 0.0 - 11.9    Glucose 227 (H) 65 - 99 mg/dL    Bun 12 8 - 22 mg/dL    Creatinine 1.23 0.50 - 1.40 mg/dL    Calcium 9.1 8.5 - 10.5 mg/dL    AST(SGOT) 23 12 - 45 U/L    ALT(SGPT) 13 2 - 50 U/L    Alkaline Phosphatase 63 30 - 99 U/L    Total Bilirubin 0.5 0.1 - 1.5 mg/dL    Albumin 4.0 3.2 - 4.9 g/dL    Total Protein 6.5 6.0 - 8.2 g/dL    Globulin 2.5 1.9 - 3.5 g/dL    A-G Ratio 1.6 g/dL   Prothrombin Time   Result Value Ref Range    PT 12.9 12.0 - 14.6 sec    INR 0.96 0.87 - 1.13   APTT   Result Value Ref Range    APTT 29.9 24.7 - 36.0 sec   Lipase   Result Value Ref Range    Lipase 56 11 - 82 U/L   ESTIMATED GFR   Result Value Ref Range    GFR If  >60 >60  mL/min/1.73 m 2    GFR If Non African American >60 >60 mL/min/1.73 m 2   POC BREATHALIZER   Result Value Ref Range    POC Breathalizer 0.00 0.00 - 0.01 Percent   EKG   Result Value Ref Range    Report       [facility] Emergency Dept.    Test Date:  2019  Pt Name:    EVERARDO JACKSON                Department: ER  MRN:        [id number]                      Room:       [facility]  Gender:     Male                         Technician: [id number]  :        1960                   Requested By:ER TRIAGE PROTOCOL  Order #:    [id number]                    Reading MD:    Measurements  Intervals                                Axis  Rate:       92                           P:          65  OH:         136                          QRS:        142  QRSD:       116                          T:          24  QT:         400  QTc:        495    Interpretive Statements  SINUS RHYTHM  CONSIDER LEFT ATRIAL ABNORMALITY  IRBBB AND LPFB  Compared to ECG 2018 02:36:19  Right-axis deviation no longer present  Myocardial infarct finding no longer present           RADIOLOGY/PROCEDURES  DX-CHEST-LIMITED (1 VIEW)   Final Result      No acute cardiopulmonary disease.            COURSE & MEDICAL DECISION MAKING  Pertinent Labs & Imaging studies reviewed. (See chart for details)  Patient's pain now completely resolved.  He remains hypertensive, being treated with nitro, as well as dose of Ativan for some anxiety he has been being seen.  Despite claim of being compliant with medication, his blood pressure remains high possibly requiring medication dose adjusting.  Patient is not medically clear for transfer to psychiatric facility, will be admitted to the hospital for ongoing medical evaluation and treatment.  Patient suicidal with plan to jump off buildings, has been placed on psychiatric hold, and is pending psychiatric evaluation.    FINAL IMPRESSION     1. Chest pain, unspecified type    2. Suicidal ideation    3.  Hypertension, unspecified type                    Electronically signed by: Steve Vance, 2/13/2019 9:18 AM

## 2019-02-13 NOTE — PROGRESS NOTES
Pt is refusing his 2 RN skin check.   Pt educated on the importance and reasoning behind skin checks and continues to refuse. Pt is aggressive and non cooperative.  2 RN education provided with Rima ESTRADA.

## 2019-02-13 NOTE — ASSESSMENT & PLAN NOTE
Patient has been placed on legal hold which was extended   -remains depressed and having auditory hallucuinations, mood has remain unchanged  Psychiatry has been consulted  -stopped seroquel  -start zyprexa, adjust prN, continue current dosing  -continue atarax, now 50 mg TID PRN, continue current dosing

## 2019-02-13 NOTE — H&P
Hospital Medicine History & Physical Note    Date of Service  2/13/2019    Primary Care Physician  Pcp Pt States None    Consultants  None    Code Status  Full code    Chief Complaint  Chest pain and suicidal ideation    History of Presenting Illness  58 y.o. male with a past medical history of hypertension, CAD status post stent placement, tobacco abuse, depression who presented 2/13/2019 with chest pain.  Patient states that he has been stressed out because of his girlfriend due to which he felt depressed and was thinking of jumping off a roof to kill himself.  Yesterday he developed substernal chest pain with radiation to the left arm associated with shortness of breath.  His chest pain lasted about 6 hours for which he took some sublingual nitro with improvement of his symptoms.  He denies any fevers, chills, nausea, diaphoresis or lightheadedness.  The patient was admitted in December 2018 for STEMI and underwent stent placement to the RCA.  Patient has been compliant with his medications.  He states he has been smoking cigarettes.  He denies any significant alcohol use.  He smokes cannabis.    In the ER the patient was found to be hypertensive with a blood pressure of 185/115  Initial troponin 0 0.01, BNP 98  EKG interpreted by me reveals sinus rhythm with RBBB and LAFB.  No ST elevation or ST depression is noted.  Chest x-ray interpreted by me reveals no acute cardiopulmonary process    Review of Systems  Review of Systems   Constitutional: Negative for chills, diaphoresis and fever.   HENT: Negative for hearing loss and sore throat.    Eyes: Negative for blurred vision.   Respiratory: Positive for shortness of breath. Negative for cough, sputum production and wheezing.    Cardiovascular: Positive for chest pain. Negative for palpitations and leg swelling.   Gastrointestinal: Negative for abdominal pain, blood in stool, diarrhea, nausea and vomiting.   Genitourinary: Negative for dysuria, flank pain and  urgency.   Musculoskeletal: Negative for back pain, joint pain, myalgias and neck pain.   Skin: Negative for rash.   Neurological: Negative for dizziness, focal weakness, seizures and headaches.   Endo/Heme/Allergies: Does not bruise/bleed easily.   Psychiatric/Behavioral: Negative for suicidal ideas.   All other systems reviewed and are negative.      Past Medical History   has a past medical history of Hypertension; Old MI (myocardial infarction); Psychiatric disorder; and PTSD (post-traumatic stress disorder).    Surgical History   has a past surgical history that includes cardiac cath, left heart.     Family History  family history includes Cancer in his mother.     Social History   reports that he has been smoking.  He has never used smokeless tobacco. He reports that he uses drugs, including Inhaled. He reports that he does not drink alcohol.    Allergies  Allergies   Allergen Reactions   • Pcn [Penicillins] Rash     Rash       Medications  Prior to Admission Medications   Prescriptions Last Dose Informant Patient Reported? Taking?   QUEtiapine (SEROQUEL) 200 MG Tab 2/12/2019 at AM Patient No No   Sig: Take 1 Tab by mouth 2 Times a Day.   aspirin EC (ECOTRIN) 81 MG Tablet Delayed Response 2/12/2019 at AM Patient No No   Sig: Take 1 Tab by mouth every day.   atorvastatin (LIPITOR) 80 MG tablet 2/11/2019 at PM Patient No No   Sig: Take 1 Tab by mouth every evening.   buPROPion SR (WELLBUTRIN-SR) 150 MG TABLET SR 12 HR sustained-release tablet 2/12/2019 at AM Patient Yes No   Sig: Take 150 mg by mouth every day. 0900,1400    clopidogrel (PLAVIX) 75 MG Tab 2/12/2019 at AM Patient No No   Sig: Take 1 Tab by mouth every day.   lisinopril (PRINIVIL, ZESTRIL) 40 MG tablet 2/12/2019 at AM Patient Yes Yes   Sig: Take 40 mg by mouth every day.   metformin (GLUCOPHAGE) 1000 MG tablet 2/12/2019 at AM Patient No No   Sig: Take 1 Tab by mouth 2 times a day, with meals.   metoprolol (LOPRESSOR) 25 MG Tab 2/12/2019 at AM  Patient No No   Sig: Take 1 Tab by mouth 2 times a day.   nitroglycerin (NITROSTAT) 0.4 MG SL Tab 2/12/2019 at PM Patient No No   Sig: Place 1 Tab under tongue as needed for Chest Pain.   omeprazole (PRILOSEC) 20 MG delayed-release capsule 2/12/2019 at AM Patient No No   Sig: Take 1 Cap by mouth every day.   prazosin (MINIPRESS) 2 MG Cap 2/11/2019 at HS Patient Yes Yes   Sig: Take 2 mg by mouth every evening.      Facility-Administered Medications: None       Physical Exam  Temp:  [36.8 °C (98.2 °F)] 36.8 °C (98.2 °F)  Pulse:  [77-96] 77  Resp:  [18] 18  BP: (165-175)/(113-121) 165/121  SpO2:  [98 %-100 %] 100 %    Physical Exam   Constitutional: He is oriented to person, place, and time. He appears well-developed and well-nourished. No distress.   HENT:   Head: Normocephalic and atraumatic.   Mouth/Throat: Oropharynx is clear and moist.   Eyes: Pupils are equal, round, and reactive to light. Conjunctivae are normal. No scleral icterus.   Neck: Normal range of motion. Neck supple.   Cardiovascular: Normal rate, regular rhythm and normal heart sounds.    Pulmonary/Chest: Effort normal and breath sounds normal. No respiratory distress. He has no wheezes. He has no rales.   Abdominal: Soft. Bowel sounds are normal. He exhibits no distension. There is no tenderness. There is no rebound.   Musculoskeletal: Normal range of motion. He exhibits no edema or tenderness.   Lymphadenopathy:     He has no cervical adenopathy.   Neurological: He is alert and oriented to person, place, and time. No cranial nerve deficit. Coordination normal.   Skin: Skin is warm. No rash noted.   Psychiatric: He has a normal mood and affect. His behavior is normal.   Nursing note and vitals reviewed.      Laboratory:  Recent Labs      02/13/19   0824   WBC  6.9   RBC  4.39*   HEMOGLOBIN  12.5*   HEMATOCRIT  38.2*   MCV  87.0   MCH  28.5   MCHC  32.7*   RDW  51.0*   PLATELETCT  314   MPV  9.3     Recent Labs      02/13/19   0824   SODIUM  130*    POTASSIUM  3.7   CHLORIDE  97   CO2  25   GLUCOSE  227*   BUN  12   CREATININE  1.23   CALCIUM  9.1     Recent Labs      02/13/19   0824   ALTSGPT  13   ASTSGOT  23   ALKPHOSPHAT  63   TBILIRUBIN  0.5   LIPASE  56   GLUCOSE  227*     Recent Labs      02/13/19   0824   APTT  29.9   INR  0.96     Recent Labs      02/13/19   0824   BNPBTYPENAT  98         Recent Labs      02/13/19   0824   TROPONINI  0.01       Urinalysis:    No results found     Imaging:  DX-CHEST-LIMITED (1 VIEW)   Final Result      No acute cardiopulmonary disease.      NM-CARDIAC STRESS TEST    (Results Pending)         Assessment/Plan:  I anticipate this patient is appropriate for observation status at this time.    Chest pain- (present on admission)   Assessment & Plan    Patient had a STEMI in December 2018 for which he underwent stent placement to the RCA  Patient is currently chest pain-free  Suspect secondary to uncontrolled hypertension  Rule out ACS  Initial EKG and troponin negative for ischemia  If patient has positive trop, we will consult cardiology  Continuous cardiac monitoring with serial EKG and troponin  Patient has been given full dose of aspirin  Check lipid panel, TSH and hemoglobin A1c  Nitro when necessary for chest pain       Suicidal ideation- (present on admission)   Assessment & Plan    Patient has been placed on legal hold  Psychiatry has been consulted     Type 2 diabetes mellitus with hyperglycemia, without long-term current use of insulin ([facility])- (present on admission)   Assessment & Plan    Uncontrolled with hyperglycemia  Start on insulin sliding scale with serial Accu-Checks  Check hemoglobin A1c  Hypoglycemic protocol in place  Hold metformin       PTSD (post-traumatic stress disorder)- (present on admission)   Assessment & Plan    Continue Wellbutrin     Hypertension- (present on admission)   Assessment & Plan    Uncontrolled  Continue lisinopril, metoprolol and Minipress  IV metoprolol and Vasotec as needed for  SBP greater than 160     CAD (coronary artery disease)- (present on admission)   Assessment & Plan    Recent STEMI with stent placement to RCA  Aspirin, Plavix and atorvastatin     Tobacco abuse- (present on admission)   Assessment & Plan    Tobacco cessation education provided for more than 11 minutes, discussed options of nicotine patch, medical treatment with wellbutrin and chantix. Discussed the risks of smoking including increased risk of heart disease, stroke, cancer and COPD. Discussed the benefits of quitting smoking.           VTE prophylaxis: Lovenox

## 2019-02-13 NOTE — ED NOTES
Patient ambulated to and from restroom with steady gait. Patient given box lunch upon request. Sitter accompanied patient to restroom and at bedside for 1:1 obs.

## 2019-02-13 NOTE — ASSESSMENT & PLAN NOTE
-sugars remain in the decent range again today  Adjust PRN to keep BS less than 150  continue on insulin sliding scale with serial Accu-Checks  A1c-8.2 indicating somewhat poor control at [facility]  Hypoglycemic protocol in place  -continue outpatient metofmirn

## 2019-02-13 NOTE — CARE PLAN
Problem: Safety  Goal: Will remain free from injury  Outcome: PROGRESSING AS EXPECTED  Pt mobility assessed at beginning of shift. Pt is a 2 assist. Fall precautions in place. Non-slip socks on. Bed in lowest locked position. Bed alarm on. Call light within reach. Pt educated to call for assistance and verbalizes understanding. Safety sitter at bedside.    Problem: Knowledge Deficit  Goal: Knowledge of disease process/condition, treatment plan, diagnostic tests, and medications will improve  Outcome: PROGRESSING AS EXPECTED  Pt educated about disease process. Reason why medications are taken. And informed about treatment plan.

## 2019-02-13 NOTE — PROGRESS NOTES
PT arrived to unit via gurney escorted by transport. VSS Pt is in sinus rhythm. PT A&O x 3 disoriented to place states he is in the ambulance.. Monitor leads in place. PT is orientated to the unit, call light, phone system, fall precautions in place, appropriate signs in place, bed in low and locked positions, bed alarm on. Pt educated regarded fall precautions and importance of calling staff for assistance. Pt denies further needs at the time. Will continue to monitor. 1-1 sitter at bedside.

## 2019-02-13 NOTE — ED TRIAGE NOTES
Patient presents to ED via EMS with c/o chest pain and SI from [facility] this morning. Patient states chest pain is left chest and started at approx 1600 yesterday 2/12/19. Patient states this morning he was having thoughts of jumping off the parking garage at the [facility]. Patient calm and cooperative with staff. A&Ox4.

## 2019-02-14 LAB
ANION GAP SERPL CALC-SCNC: 7 MMOL/L (ref 0–11.9)
BASOPHILS # BLD AUTO: 0.7 % (ref 0–1.8)
BASOPHILS # BLD: 0.04 K/UL (ref 0–0.12)
BUN SERPL-MCNC: 15 MG/DL (ref 8–22)
CALCIUM SERPL-MCNC: 8.9 MG/DL (ref 8.5–10.5)
CHLORIDE SERPL-SCNC: 105 MMOL/L (ref 96–112)
CHOLEST SERPL-MCNC: 120 MG/DL (ref 100–199)
CO2 SERPL-SCNC: 22 MMOL/L (ref 20–33)
CREAT SERPL-MCNC: 1.51 MG/DL (ref 0.5–1.4)
EKG IMPRESSION: NORMAL
EOSINOPHIL # BLD AUTO: 0.25 K/UL (ref 0–0.51)
EOSINOPHIL NFR BLD: 4.1 % (ref 0–6.9)
ERYTHROCYTE [DISTWIDTH] IN BLOOD BY AUTOMATED COUNT: 53.2 FL (ref 35.9–50)
GLUCOSE SERPL-MCNC: 306 MG/DL (ref 65–99)
HCT VFR BLD AUTO: 36.6 % (ref 42–52)
HDLC SERPL-MCNC: 64 MG/DL
HGB BLD-MCNC: 11.7 G/DL (ref 14–18)
IMM GRANULOCYTES # BLD AUTO: 0.03 K/UL (ref 0–0.11)
IMM GRANULOCYTES NFR BLD AUTO: 0.5 % (ref 0–0.9)
LDLC SERPL CALC-MCNC: 40 MG/DL
LYMPHOCYTES # BLD AUTO: 0.97 K/UL (ref 1–4.8)
LYMPHOCYTES NFR BLD: 15.8 % (ref 22–41)
MCH RBC QN AUTO: 28.6 PG (ref 27–33)
MCHC RBC AUTO-ENTMCNC: 32 G/DL (ref 33.7–35.3)
MCV RBC AUTO: 89.5 FL (ref 81.4–97.8)
MONOCYTES # BLD AUTO: 0.57 K/UL (ref 0–0.85)
MONOCYTES NFR BLD AUTO: 9.3 % (ref 0–13.4)
NEUTROPHILS # BLD AUTO: 4.26 K/UL (ref 1.82–7.42)
NEUTROPHILS NFR BLD: 69.6 % (ref 44–72)
NRBC # BLD AUTO: 0 K/UL
NRBC BLD-RTO: 0 /100 WBC
PLATELET # BLD AUTO: 292 K/UL (ref 164–446)
PMV BLD AUTO: 9.8 FL (ref 9–12.9)
POTASSIUM SERPL-SCNC: 3.8 MMOL/L (ref 3.6–5.5)
RBC # BLD AUTO: 4.09 M/UL (ref 4.7–6.1)
SODIUM SERPL-SCNC: 134 MMOL/L (ref 135–145)
TRIGL SERPL-MCNC: 81 MG/DL (ref 0–149)
WBC # BLD AUTO: 6.1 K/UL (ref 4.8–10.8)

## 2019-02-14 PROCEDURE — 85025 COMPLETE CBC W/AUTO DIFF WBC: CPT

## 2019-02-14 PROCEDURE — 80048 BASIC METABOLIC PNL TOTAL CA: CPT

## 2019-02-14 PROCEDURE — A9270 NON-COVERED ITEM OR SERVICE: HCPCS | Performed by: PSYCHIATRY & NEUROLOGY

## 2019-02-14 PROCEDURE — G0378 HOSPITAL OBSERVATION PER HR: HCPCS

## 2019-02-14 PROCEDURE — 36415 COLL VENOUS BLD VENIPUNCTURE: CPT

## 2019-02-14 PROCEDURE — A9270 NON-COVERED ITEM OR SERVICE: HCPCS | Performed by: INTERNAL MEDICINE

## 2019-02-14 PROCEDURE — 700102 HCHG RX REV CODE 250 W/ 637 OVERRIDE(OP): Performed by: PSYCHIATRY & NEUROLOGY

## 2019-02-14 PROCEDURE — 99225 PR SUBSEQUENT OBSERVATION CARE,LEVEL II: CPT | Performed by: INTERNAL MEDICINE

## 2019-02-14 PROCEDURE — 99214 OFFICE O/P EST MOD 30 MIN: CPT | Performed by: PSYCHIATRY & NEUROLOGY

## 2019-02-14 PROCEDURE — 700102 HCHG RX REV CODE 250 W/ 637 OVERRIDE(OP): Performed by: INTERNAL MEDICINE

## 2019-02-14 PROCEDURE — 80061 LIPID PANEL: CPT

## 2019-02-14 RX ADMIN — ASPIRIN 81 MG: 81 TABLET, COATED ORAL at 06:21

## 2019-02-14 RX ADMIN — METOPROLOL TARTRATE 25 MG: 25 TABLET, FILM COATED ORAL at 18:37

## 2019-02-14 RX ADMIN — CLOPIDOGREL 75 MG: 75 TABLET, FILM COATED ORAL at 06:21

## 2019-02-14 RX ADMIN — LISINOPRIL 40 MG: 20 TABLET ORAL at 06:21

## 2019-02-14 RX ADMIN — BUPROPION HYDROCHLORIDE 150 MG: 150 TABLET, FILM COATED, EXTENDED RELEASE ORAL at 18:37

## 2019-02-14 RX ADMIN — OMEPRAZOLE 20 MG: 20 CAPSULE, DELAYED RELEASE ORAL at 06:22

## 2019-02-14 RX ADMIN — OLANZAPINE 5 MG: 5 TABLET, FILM COATED ORAL at 18:37

## 2019-02-14 RX ADMIN — BUPROPION HYDROCHLORIDE 150 MG: 150 TABLET, FILM COATED, EXTENDED RELEASE ORAL at 06:21

## 2019-02-14 RX ADMIN — ATORVASTATIN CALCIUM 80 MG: 80 TABLET, FILM COATED ORAL at 18:37

## 2019-02-14 RX ADMIN — METOPROLOL TARTRATE 25 MG: 25 TABLET, FILM COATED ORAL at 06:22

## 2019-02-14 ASSESSMENT — ENCOUNTER SYMPTOMS
VOMITING: 0
CHILLS: 0
NAUSEA: 0
FEVER: 0
NERVOUS/ANXIOUS: 1
INSOMNIA: 1
ABDOMINAL PAIN: 0
DEPRESSION: 1
BLURRED VISION: 0

## 2019-02-14 NOTE — PSYCHIATRY
"PSYCHIATRIC CONSULTATION:  Reason for admission:   Chest pain   Reason for consult: suicidal ideation  Requesting Physician: Josy    Legal status:    On hold     Chief Complaint:  \"leave me alone!\"     HPI:   Rajiv Sam is a 58 y.o. year old male with a PMH of HTN, CAD who presented to [facility] complaining of chest pain, talking about wanting to jump off a roof to kill himself. He has presented similarly in the past. He is very irritable. He yells at this provider several times for attempting to wake or talk to him. He is clearly awake but sometimes volitionally closed his eyes to avoid talking. He is quite hypervigilant. Per sitter, he has basically been closing his eyes and refusing to talk to people or yelling at people since he came up here from the ED. He nods that he has been thinking about suicide. He won't give me information about a plan.     Review of Systems:  Psychiatric:  Depression:      Depressed mood. Irritable. +si.   Johana: No signs or symptoms   Anxiety/Panic Attacks  Denies   PTSD symptom: denies   Psychosis:possibly internally stimulated   Other:  Neurologic:  Unable to complete ros due to ms.   Eyes:    Skin:    Musculoskeletal:    CV:    Lungs:    GI:    :    Endocrine:    All other systems reviewed and are negative.        Psychiatric Examination: observed phenomenon:  Vitals: /81   Pulse 68   Temp 36.9 °C (98.4 °F) (Temporal)   Resp 18   Ht 1.626 m (5' 4\")   Wt 54.8 kg (120 lb 13 oz)   SpO2 95%   BMI 20.74 kg/m²  Body mass index is 20.74 kg/m².  Musculoskeletal: psychomotor retardation   Appearance:very thin   Thought Process: slightly slowed   Thought Content: unable to fully assess.   Speech: sparse, irritable tone   Mood:          Depressed   Affect:         Constricted   SI/HI:   +si   Attention/Alertness:   Awake. Attention por   Memory:    unable to fully assess  Orientation:       Unable to fully assess  Cognition:  Appears impaired   Insight/Judgement into symptoms: " poor   Neurological Testing:          Past Psychiatric Hx:   Has been hospitalized multiple times in [facility]  Has been hospitalized in [place] in 2016  Has been hospitalized at [facility] and in [place]  Has a hx of suicide attempts.       Family Psychiatric Hx:  dneies     Social Hx:  Social History     Social History   • Marital status: Single     Spouse name: N/A   • Number of children: N/A   • Years of education: N/A     Occupational History   • Not on file.     Social History Main Topics   • Smoking status: Current Every Day Smoker   • Smokeless tobacco: Never Used   • Alcohol use No   • Drug use: Yes     Types: Inhaled      Comment: pot   • Sexual activity: Not on file     Other Topics Concern   • Not on file     Social History Narrative   • No narrative on file     Often homeles  On SSI    Drug/Alcohol/Tobacco Hx:   Drugs: +marijuana   Alcohol: denies    Tobacco:+    Medical Hx: labs, MARS, medications, etc were reviewed. Only those findings of potential interest to psychiatry are noted below:    Past Medical History:   Diagnosis Date   • Hypertension    • Old MI (myocardial infarction)    • Psychiatric disorder     depression    • PTSD (post-traumatic stress disorder)      Past Surgical History:   Procedure Laterality Date   • CARDIAC CATH, LEFT HEART         Allergies:   Allergies   Allergen Reactions   • Pcn [Penicillins] Rash     Rash       Medications:  Current Facility-Administered Medications   Medication Dose Route Frequency Provider Last Rate Last Dose   • senna-docusate (PERICOLACE or SENOKOT S) 8.6-50 MG per tablet 2 Tab  2 Tab Oral BID Lamont Ferris M.D.        And   • polyethylene glycol/lytes (MIRALAX) PACKET 1 Packet  1 Packet Oral QDAY PRN Lamont Ferris M.D.        And   • magnesium hydroxide (MILK OF MAGNESIA) suspension 30 mL  30 mL Oral QDAY PRN Lamont Ferris M.D.        And   • bisacodyl (DULCOLAX) suppository 10 mg  10 mg Rectal QDAY PRN Lamont Ferris M.D.       • Respiratory Care per  Protocol   Nebulization Continuous RT Lamont Ferris M.D.       • acetaminophen (TYLENOL) tablet 650 mg  650 mg Oral Q6HRS PRN Lamont Ferris M.D.       • enalaprilat (VASOTEC) injection 1.25 mg  1.25 mg Intravenous Q6HRS PRN Lamont Ferris M.D.       • ondansetron (ZOFRAN) syringe/vial injection 4 mg  4 mg Intravenous Q4HRS PRN Lamont Ferris M.D.       • ondansetron (ZOFRAN ODT) dispertab 4 mg  4 mg Oral Q4HRS PRN Lamont Ferris M.D.       • Metoprolol Tartrate (LOPRESSOR) injection 5 mg  5 mg Intravenous Q5 MIN PRN Steve Vance M.D.   5 mg at 02/13/19 0953   • aspirin EC (ECOTRIN) tablet 81 mg  81 mg Oral DAILY LEONARDO SoriaDMaribeth   81 mg at 02/14/19 0621   • atorvastatin (LIPITOR) tablet 80 mg  80 mg Oral Q EVENING LEONARDO SoriaDMaribeth   80 mg at 02/13/19 1745   • buPROPion SR (WELLBUTRIN-SR) tablet 150 mg  150 mg Oral BID Lamont Ferris M.D.   150 mg at 02/14/19 0621   • clopidogrel (PLAVIX) tablet 75 mg  75 mg Oral DAILY LEONARDO SoriaDMaribeth   75 mg at 02/14/19 0621   • lisinopril (PRINIVIL) tablet 40 mg  40 mg Oral DAILY LEONARDO SoriaDMaribeth   40 mg at 02/14/19 0621   • metoprolol (LOPRESSOR) tablet 25 mg  25 mg Oral BID LEONARDO SoriaDMaribeth   25 mg at 02/14/19 0622   • nitroglycerin (NITROSTAT) tablet 0.4 mg  0.4 mg Sublingual PRN Lamont Ferris M.D.       • omeprazole (PRILOSEC) capsule 20 mg  20 mg Oral DAILY Lamont Ferris M.D.   20 mg at 02/14/19 0622   • prazosin (MINIPRESS) capsule 2 mg  2 mg Oral Nightly Lamont Ferris M.D.   2 mg at 02/13/19 2202   • Metoprolol Tartrate (LOPRESSOR) injection 5 mg  5 mg Intravenous Q3HRS PRN Lamont Ferris M.D.       • enoxaparin (LOVENOX) inj 40 mg  40 mg Subcutaneous DAILY Lamont Ferris M.D.       • OLANZapine (ZYPREXA) tablet 5 mg  5 mg Oral Q EVENING Crissy Macdonald M.D.   5 mg at 02/13/19 2202       Labs/ Testing:  Recent Results (from the past 48 hour(s))   Troponin    Collection Time: 02/13/19  8:24 AM   Result Value Ref Range    Troponin I 0.01 0.00 - 0.04 ng/mL   Btype  Natriuretic Peptide    Collection Time: 02/13/19  8:24 AM   Result Value Ref Range    B Natriuretic Peptide 98 0 - 100 pg/mL   CBC with Differential    Collection Time: 02/13/19  8:24 AM   Result Value Ref Range    WBC 6.9 4.8 - 10.8 K/uL    RBC 4.39 (L) 4.70 - 6.10 M/uL    Hemoglobin 12.5 (L) 14.0 - 18.0 g/dL    Hematocrit 38.2 (L) 42.0 - 52.0 %    MCV 87.0 81.4 - 97.8 fL    MCH 28.5 27.0 - 33.0 pg    MCHC 32.7 (L) 33.7 - 35.3 g/dL    RDW 51.0 (H) 35.9 - 50.0 fL    Platelet Count 314 164 - 446 K/uL    MPV 9.3 9.0 - 12.9 fL    Neutrophils-Polys 71.90 44.00 - 72.00 %    Lymphocytes 15.70 (L) 22.00 - 41.00 %    Monocytes 9.90 0.00 - 13.40 %    Eosinophils 1.50 0.00 - 6.90 %    Basophils 0.90 0.00 - 1.80 %    Immature Granulocytes 0.10 0.00 - 0.90 %    Nucleated RBC 0.00 /100 WBC    Neutrophils (Absolute) 4.93 1.82 - 7.42 K/uL    Lymphs (Absolute) 1.08 1.00 - 4.80 K/uL    Monos (Absolute) 0.68 0.00 - 0.85 K/uL    Eos (Absolute) 0.10 0.00 - 0.51 K/uL    Baso (Absolute) 0.06 0.00 - 0.12 K/uL    Immature Granulocytes (abs) 0.01 0.00 - 0.11 K/uL    NRBC (Absolute) 0.00 K/uL   Complete Metabolic Panel (CMP)    Collection Time: 02/13/19  8:24 AM   Result Value Ref Range    Sodium 130 (L) 135 - 145 mmol/L    Potassium 3.7 3.6 - 5.5 mmol/L    Chloride 97 96 - 112 mmol/L    Co2 25 20 - 33 mmol/L    Anion Gap 8.0 0.0 - 11.9    Glucose 227 (H) 65 - 99 mg/dL    Bun 12 8 - 22 mg/dL    Creatinine 1.23 0.50 - 1.40 mg/dL    Calcium 9.1 8.5 - 10.5 mg/dL    AST(SGOT) 23 12 - 45 U/L    ALT(SGPT) 13 2 - 50 U/L    Alkaline Phosphatase 63 30 - 99 U/L    Total Bilirubin 0.5 0.1 - 1.5 mg/dL    Albumin 4.0 3.2 - 4.9 g/dL    Total Protein 6.5 6.0 - 8.2 g/dL    Globulin 2.5 1.9 - 3.5 g/dL    A-G Ratio 1.6 g/dL   Prothrombin Time    Collection Time: 02/13/19  8:24 AM   Result Value Ref Range    PT 12.9 12.0 - 14.6 sec    INR 0.96 0.87 - 1.13   APTT    Collection Time: 02/13/19  8:24 AM   Result Value Ref Range    APTT 29.9 24.7 - 36.0 sec    Lipase    Collection Time: 19  8:24 AM   Result Value Ref Range    Lipase 56 11 - 82 U/L   ESTIMATED GFR    Collection Time: 19  8:24 AM   Result Value Ref Range    GFR If African American >60 >60 mL/min/1.73 m 2    GFR If Non African American >60 >60 mL/min/1.73 m 2   TSH WITH REFLEX TO FT4    Collection Time: 19  8:24 AM   Result Value Ref Range    TSH 2.310 0.380 - 5.330 uIU/mL   MAGNESIUM    Collection Time: 19  8:24 AM   Result Value Ref Range    Magnesium 2.1 1.5 - 2.5 mg/dL   EKG    Collection Time: 19  8:45 AM   Result Value Ref Range    Report       [facility] Emergency Dept.    Test Date:  2019  Pt Name:    EVERARDO JACKSON                Department: ER  MRN:        [id number]                      Room:       [facility]  Gender:     Male                         Technician: [id number]  :        1960                   Requested By:ER TRIAGE PROTOCOL  Order #:    [id number]                    Reading MD: OTILIO URIBE MD    Measurements  Intervals                                Axis  Rate:       92                           P:          65  OH:         136                          QRS:        142  QRSD:       116                          T:          24  QT:         400  QTc:        495    Interpretive Statements  SINUS RHYTHM  CONSIDER LEFT ATRIAL ABNORMALITY  IRBBB AND LPFB  Compared to ECG 2018 02:36:19  Right-axis deviation no longer present  Myocardial infarct finding no longer present    Electronically Signed On 2019 9:22:17 PST by OTILIO URIBE MD     POC BREATHALIZER    Collection Time: 19  8:53 AM   Result Value Ref Range    POC Breathalizer 0.00 0.00 - 0.01 Percent   Urine Drug Screen    Collection Time: 19  8:57 AM   Result Value Ref Range    Amphetamines Urine Positive (A) Negative    Barbiturates Negative Negative    Benzodiazepines Negative Negative    Cocaine Metabolite Negative Negative    Methadone Negative  Negative    Opiates Negative Negative    Oxycodone Negative Negative    Phencyclidine -Pcp Negative Negative    Propoxyphene Negative Negative    Cannabinoid Metab Positive (A) Negative   Troponin - STAT Once    Collection Time: 19 10:57 AM   Result Value Ref Range    Troponin I 0.01 0.00 - 0.04 ng/mL   Hemoglobin A1c    Collection Time: 19 10:57 AM   Result Value Ref Range    Glycohemoglobin 8.2 (H) 0.0 - 5.6 %    Est Avg Glucose 189 mg/dL   ACCU-CHEK GLUCOSE    Collection Time: 19  1:25 PM   Result Value Ref Range    Glucose - Accu-Ck 220 (H) 65 - 99 mg/dL   EKG in four (4) hours    Collection Time: 19  5:30 PM   Result Value Ref Range    Report       [facility]    Test Date:  2019  Pt Name:    EVERARDO JACKSON                Department: ER  MRN:        [id number]                      Room:       [facility]  Gender:     Male                         Technician: [facility]  :        1960                   Requested By:JUNAID COLEMAN  Order #:    [id number]                    Reading MD: Patricio Slade MD    Measurements  Intervals                                Axis  Rate:       64                           P:          62  NC:         128                          QRS:        136  QRSD:       114                          T:          45  QT:         464  QTc:        479    Interpretive Statements  SINUS RHYTHM  IRBBB AND LPFB  BORDERLINE INFERIOR Q WAVES  Compared to ECG 2019 08:45:19  No significant changes    Electronically Signed On 2019 9:44:30 PST by Patricio Slade MD     CBC with Differential    Collection Time: 19  3:21 AM   Result Value Ref Range    WBC 6.1 4.8 - 10.8 K/uL    RBC 4.09 (L) 4.70 - 6.10 M/uL    Hemoglobin 11.7 (L) 14.0 - 18.0 g/dL    Hematocrit 36.6 (L) 42.0 - 52.0 %    MCV 89.5 81.4 - 97.8 fL    MCH 28.6 27.0 - 33.0 pg    MCHC 32.0 (L) 33.7 - 35.3 g/dL    RDW 53.2 (H) 35.9 - 50.0 fL    Platelet Count 292 164 - 446 K/uL    MPV 9.8 9.0 - 12.9 fL     Neutrophils-Polys 69.60 44.00 - 72.00 %    Lymphocytes 15.80 (L) 22.00 - 41.00 %    Monocytes 9.30 0.00 - 13.40 %    Eosinophils 4.10 0.00 - 6.90 %    Basophils 0.70 0.00 - 1.80 %    Immature Granulocytes 0.50 0.00 - 0.90 %    Nucleated RBC 0.00 /100 WBC    Neutrophils (Absolute) 4.26 1.82 - 7.42 K/uL    Lymphs (Absolute) 0.97 (L) 1.00 - 4.80 K/uL    Monos (Absolute) 0.57 0.00 - 0.85 K/uL    Eos (Absolute) 0.25 0.00 - 0.51 K/uL    Baso (Absolute) 0.04 0.00 - 0.12 K/uL    Immature Granulocytes (abs) 0.03 0.00 - 0.11 K/uL    NRBC (Absolute) 0.00 K/uL   Basic Metabolic Panel (BMP)    Collection Time: 02/14/19  3:22 AM   Result Value Ref Range    Sodium 134 (L) 135 - 145 mmol/L    Potassium 3.8 3.6 - 5.5 mmol/L    Chloride 105 96 - 112 mmol/L    Co2 22 20 - 33 mmol/L    Glucose 306 (H) 65 - 99 mg/dL    Bun 15 8 - 22 mg/dL    Creatinine 1.51 (H) 0.50 - 1.40 mg/dL    Calcium 8.9 8.5 - 10.5 mg/dL    Anion Gap 7.0 0.0 - 11.9   Lipid Profile    Collection Time: 02/14/19  3:22 AM   Result Value Ref Range    Cholesterol,Tot 120 100 - 199 mg/dL    Triglycerides 81 0 - 149 mg/dL    HDL 64 >=40 mg/dL    LDL 40 <100 mg/dL   ESTIMATED GFR    Collection Time: 02/14/19  3:22 AM   Result Value Ref Range    GFR If  58 (A) >60 mL/min/1.73 m 2    GFR If Non  48 (A) >60 mL/min/1.73 m 2       DX-CHEST-LIMITED (1 VIEW)   Final Result      No acute cardiopulmonary disease.          ASSESSMENT:   Bipolar Disorder     PLAN:  Legal status: on hold   Extending     D/c seroquel, which he has been on for many year and hasnt been managing his mood.   Starting zyprexa 5mg po qhs.   Will follow.   Thank you for the consult.

## 2019-02-14 NOTE — PROGRESS NOTES
Bedside report received from day RN. Assumed care of pt at 1900. Pt has tele box in place. No s/s of pain or discomfort. Educated to ask sitter to use call light. Bed is in low and locked position. Will continue to monitor.    Safety precautions in place at this time. Patient is a 1:1 with legal hold. Sitter had been briefed and has sitter card.

## 2019-02-14 NOTE — PROGRESS NOTES
0715 Assumed care. Bedside report from Carola ESTRADA. Sleeping and in no distress. Bed in low position, 1:1 staff close observation in progress.

## 2019-02-14 NOTE — PSYCHIATRY
BRIEF PSYCHIATRIC CONSULT NOTE: patient seen, full note to follow.  Please keep 1:1 sitter.     He has been agitated at times. Reporting si. States he won't act on it. Largely uncooperative with exam.       -Assessment: bipolar disorder  Plan:  Continue wellbutrin for now  D/c seroquel   Start zyprexa 5mg po qhs and titrate up as tolerated.   Will follow.

## 2019-02-14 NOTE — CARE PLAN
Problem: Communication  Goal: The ability to communicate needs accurately and effectively will improve  Outcome: PROGRESSING SLOWER THAN EXPECTED  Patient attempting to communicate needs but if frustrated with staff often. Patient can be verbally and physically aggressive if perceived needs that are not avail ible or included in plan of care are not given to him. De-escalation techniques will be used and education on current plan of care provided.     Problem: Safety  Goal: Will remain free from injury  Pt remains free from falls at this time. Safety precautions in place. Pt educated on calling for assistance when needed.

## 2019-02-15 LAB
ANION GAP SERPL CALC-SCNC: 6 MMOL/L (ref 0–11.9)
BUN SERPL-MCNC: 18 MG/DL (ref 8–22)
CALCIUM SERPL-MCNC: 8.4 MG/DL (ref 8.5–10.5)
CHLORIDE SERPL-SCNC: 106 MMOL/L (ref 96–112)
CO2 SERPL-SCNC: 25 MMOL/L (ref 20–33)
CREAT SERPL-MCNC: 1.33 MG/DL (ref 0.5–1.4)
GLUCOSE SERPL-MCNC: 196 MG/DL (ref 65–99)
MAGNESIUM SERPL-MCNC: 2 MG/DL (ref 1.5–2.5)
POTASSIUM SERPL-SCNC: 4 MMOL/L (ref 3.6–5.5)
SODIUM SERPL-SCNC: 137 MMOL/L (ref 135–145)

## 2019-02-15 PROCEDURE — 700102 HCHG RX REV CODE 250 W/ 637 OVERRIDE(OP): Performed by: PSYCHIATRY & NEUROLOGY

## 2019-02-15 PROCEDURE — A9270 NON-COVERED ITEM OR SERVICE: HCPCS | Performed by: PSYCHIATRY & NEUROLOGY

## 2019-02-15 PROCEDURE — A9270 NON-COVERED ITEM OR SERVICE: HCPCS | Performed by: INTERNAL MEDICINE

## 2019-02-15 PROCEDURE — 80048 BASIC METABOLIC PNL TOTAL CA: CPT

## 2019-02-15 PROCEDURE — G0378 HOSPITAL OBSERVATION PER HR: HCPCS

## 2019-02-15 PROCEDURE — 700102 HCHG RX REV CODE 250 W/ 637 OVERRIDE(OP): Performed by: INTERNAL MEDICINE

## 2019-02-15 PROCEDURE — 700111 HCHG RX REV CODE 636 W/ 250 OVERRIDE (IP): Performed by: INTERNAL MEDICINE

## 2019-02-15 PROCEDURE — 96375 TX/PRO/DX INJ NEW DRUG ADDON: CPT

## 2019-02-15 PROCEDURE — 99225 PR SUBSEQUENT OBSERVATION CARE,LEVEL II: CPT | Performed by: INTERNAL MEDICINE

## 2019-02-15 PROCEDURE — 83735 ASSAY OF MAGNESIUM: CPT

## 2019-02-15 PROCEDURE — 36415 COLL VENOUS BLD VENIPUNCTURE: CPT

## 2019-02-15 RX ORDER — HYDROXYZINE HYDROCHLORIDE 25 MG/1
25 TABLET, FILM COATED ORAL EVERY 8 HOURS
Status: DISCONTINUED | OUTPATIENT
Start: 2019-02-15 | End: 2019-02-16

## 2019-02-15 RX ADMIN — OMEPRAZOLE 20 MG: 20 CAPSULE, DELAYED RELEASE ORAL at 05:23

## 2019-02-15 RX ADMIN — CLOPIDOGREL 75 MG: 75 TABLET, FILM COATED ORAL at 05:25

## 2019-02-15 RX ADMIN — METOPROLOL TARTRATE 25 MG: 25 TABLET, FILM COATED ORAL at 17:45

## 2019-02-15 RX ADMIN — BUPROPION HYDROCHLORIDE 150 MG: 150 TABLET, FILM COATED, EXTENDED RELEASE ORAL at 05:24

## 2019-02-15 RX ADMIN — ATORVASTATIN CALCIUM 80 MG: 80 TABLET, FILM COATED ORAL at 17:45

## 2019-02-15 RX ADMIN — METOPROLOL TARTRATE 25 MG: 25 TABLET, FILM COATED ORAL at 05:25

## 2019-02-15 RX ADMIN — ASPIRIN 81 MG: 81 TABLET, COATED ORAL at 05:24

## 2019-02-15 RX ADMIN — OLANZAPINE 5 MG: 5 TABLET, FILM COATED ORAL at 17:45

## 2019-02-15 RX ADMIN — ENALAPRILAT 1.25 MG: 2.5 INJECTION INTRAVENOUS at 01:25

## 2019-02-15 RX ADMIN — HYDROXYZINE HYDROCHLORIDE 25 MG: 25 TABLET, FILM COATED ORAL at 21:55

## 2019-02-15 RX ADMIN — PRAZOSIN HYDROCHLORIDE 2 MG: 2 CAPSULE ORAL at 21:55

## 2019-02-15 RX ADMIN — LISINOPRIL 40 MG: 20 TABLET ORAL at 05:25

## 2019-02-15 ASSESSMENT — ENCOUNTER SYMPTOMS
PALPITATIONS: 0
FEVER: 0
SHORTNESS OF BREATH: 0
NERVOUS/ANXIOUS: 1
INSOMNIA: 1
BLURRED VISION: 0
DEPRESSION: 1

## 2019-02-15 NOTE — CARE PLAN
Problem: Communication  Goal: The ability to communicate needs accurately and effectively will improve  Outcome: PROGRESSING SLOWER THAN EXPECTED    Intervention: [place] patient and significant other/support system to call light to alert staff of needs   02/15/19 1128   OTHER   Oriented to: All of the Following : Location of Bathroom, Visiting Policy, Unit Routine, Call Light and Bedside Controls, Bedside Rail Policy, Smoking Policy, Rights and Responsibilities, Bedside Report, and Patient Education Notebook         Problem: Safety  Goal: Will remain free from injury  Outcome: PROGRESSING SLOWER THAN EXPECTED  Educated patient on the importance of using the call light when needing assistance. Bed locked and in the lowest position. Tray table at bedside with patients belongings in reach. 1:1 sitter in place due to SI.

## 2019-02-15 NOTE — PROGRESS NOTES
Received report from night shift nurse. Tele box off. Updated patient about plan of care. All questions answered. Pt complained of 7 out of 10 pain, but refuses pain medication. A&Ox4. Agitated and anxious. 1:1 sitter present due to SI. Bed locked and in lowest position. Call light in reach. Will continue to monitor.

## 2019-02-15 NOTE — PROGRESS NOTES
[facility] Medicine Daily Progress Note    Date of Service  2/14/2019    Chief Complaint  58 y.o. male admitted 2/13/2019 with CP and SI    Hospital Course  Chest pain-resolved.no issues on tele last night and CM's remain negative.    SI-main problem, patient has been battling this for years and has had bipolar for many years. Been dealing with a lot of girlfriends recently. No definitive plan at this time. Psych is following.    Interval Problem Updatesee above    Consultants/Specialty  Psych    Code Status  fcfc    Disposition  TELE    Review of Systems  Review of Systems   Constitutional: Positive for malaise/fatigue. Negative for chills and fever.   Eyes: Negative for blurred vision.   Cardiovascular: Negative for chest pain.   Gastrointestinal: Negative for abdominal pain, nausea and vomiting.   Genitourinary: Negative for dysuria.   Psychiatric/Behavioral: Positive for depression and suicidal ideas. The patient is nervous/anxious and has insomnia.    All other systems reviewed and are negative.       Physical Exam  Temp:  [36.7 °C (98.1 °F)-37.2 °C (98.9 °F)] 36.8 °C (98.3 °F)  Pulse:  [47-74] 74  Resp:  [17-18] 18  BP: (102-135)/(64-87) 135/87  SpO2:  [95 %-100 %] 98 %    Physical Exam   Constitutional: He is oriented to person, place, and time. He appears well-developed and well-nourished. No distress.   Thin, disheveled, appears older than stated age   HENT:   Head: Normocephalic and atraumatic.   Mouth/Throat: No oropharyngeal exudate.   Eyes: Pupils are equal, round, and reactive to light. No scleral icterus.   Neck: Normal range of motion. Neck supple.   Cardiovascular: Normal rate, regular rhythm, normal heart sounds and intact distal pulses.    No murmur heard.  Pulmonary/Chest: Effort normal and breath sounds normal. No stridor. No respiratory distress.   Abdominal: Soft. Bowel sounds are normal. He exhibits no distension. There is no tenderness.   Musculoskeletal: Normal range of motion. He exhibits no  edema.   Neurological: He is alert and oriented to person, place, and time. No cranial nerve deficit.   Skin: Skin is warm and dry. No rash noted.   Psychiatric:   Anxious, slightly pressured speech   Nursing note and vitals reviewed.      Fluids    Intake/Output Summary (Last 24 hours) at 02/14/19 2023  Last data filed at 02/14/19 0219   Gross per 24 hour   Intake             1200 ml   Output              400 ml   Net              800 ml       Laboratory  Recent Labs      02/13/19 0824 02/14/19 0321   WBC  6.9  6.1   RBC  4.39*  4.09*   HEMOGLOBIN  12.5*  11.7*   HEMATOCRIT  38.2*  36.6*   MCV  87.0  89.5   MCH  28.5  28.6   MCHC  32.7*  32.0*   RDW  51.0*  53.2*   PLATELETCT  314  292   MPV  9.3  9.8     Recent Labs      02/13/19 0824 02/14/19   0322   SODIUM  130*  134*   POTASSIUM  3.7  3.8   CHLORIDE  97  105   CO2  25  22   GLUCOSE  227*  306*   BUN  12  15   CREATININE  1.23  1.51*   CALCIUM  9.1  8.9     Recent Labs      02/13/19 0824   APTT  29.9   INR  0.96     Recent Labs      02/13/19 0824   BNPBTYPENAT  98     Recent Labs      02/14/19   0322   TRIGLYCERIDE  81   HDL  64   LDL  40       Imaging  DX-CHEST-LIMITED (1 VIEW)   Final Result      No acute cardiopulmonary disease.           Assessment/Plan  Chest pain- (present on admission)   Assessment & Plan    Patient had a STEMI in December 2018 for which he underwent stent placement to the RCA  Patient is currently chest pain-free  Suspect secondary to uncontrolled hypertension  -no aCS, likely 2/2 stress, possibly MSK  -consider discontinuing TELE tomorrow  -stop checking troponins, no CP currently  -continue all current cardiac medications       Suicidal ideation- (present on admission)   Assessment & Plan    Patient has been placed on legal hold which was extended today  Psychiatry has been consulted  -stopped seroquel  -start zyprexa, adjust prN     Type 2 diabetes mellitus with hyperglycemia, without long-term current use of insulin  (HCC)- (present on admission)   Assessment & Plan    Uncontrolled with hyperglycemia, but blood sugars have improved somewhat today, continue to adjust PRN to keep BS less than 150  Start on insulin sliding scale with serial Accu-Checks  Check hemoglobin A1c  Hypoglycemic protocol in place  Hold metformin       PTSD (post-traumatic stress disorder)- (present on admission)   Assessment & Plan    Continue Wellbutrin     Hypertension- (present on admission)   Assessment & Plan    Much better control achieved today  Continue lisinopril, metoprolol and Minipress  IV metoprolol and Vasotec as needed for SBP greater than 160     CAD (coronary artery disease)- (present on admission)   Assessment & Plan    Recent STEMI with stent placement to RCA  Aspirin, Plavix and atorvastatin     Tobacco abuse- (present on admission)   Assessment & Plan    Cessation counseling given            VTE prophylaxis: heparin

## 2019-02-15 NOTE — PROGRESS NOTES
Bedside report received from day RN; assumed pt care. Pt assessment complete. Pt A&Ox3, disoriented to time. Reviewed plan of care with pt. Tele box on and rhythm verified. Chart and labs reviewed. Bed in lowest position, and 2 side rails up. Pt educated on call light; call light with in reach.

## 2019-02-15 NOTE — CARE PLAN
Problem: Safety  Goal: Will remain free from injury  Outcome: PROGRESSING AS EXPECTED  Pt safety precautions in place; bed in lowest lock position, 2 side rails up, non slip socks on, call light within reach- educated on when and how to use call light.      Problem: Psychosocial Needs:  Goal: Level of anxiety will decrease  Outcome: PROGRESSING SLOWER THAN EXPECTED   02/14/19 2000   OTHER   Patient Behaviors Agitated;Angry;Uncooperative

## 2019-02-15 NOTE — PROGRESS NOTES
Pt in T705 started the shift stating  he would be as difficult as possible for night shift    Non stop amount of complaints.  He called for help numerous times (immediately when we left the room he pressed the call light)  Large number of requests (food, meds)  Very rude to the psa and other staff.    Now, at 2230, got relaxed and sleepy.

## 2019-02-16 LAB
GLUCOSE BLD-MCNC: 129 MG/DL (ref 65–99)
GLUCOSE BLD-MCNC: 204 MG/DL (ref 65–99)
GLUCOSE BLD-MCNC: 206 MG/DL (ref 65–99)

## 2019-02-16 PROCEDURE — A9270 NON-COVERED ITEM OR SERVICE: HCPCS | Performed by: PSYCHIATRY & NEUROLOGY

## 2019-02-16 PROCEDURE — 700102 HCHG RX REV CODE 250 W/ 637 OVERRIDE(OP): Performed by: PSYCHIATRY & NEUROLOGY

## 2019-02-16 PROCEDURE — 96372 THER/PROPH/DIAG INJ SC/IM: CPT

## 2019-02-16 PROCEDURE — G0378 HOSPITAL OBSERVATION PER HR: HCPCS

## 2019-02-16 PROCEDURE — 700102 HCHG RX REV CODE 250 W/ 637 OVERRIDE(OP): Performed by: INTERNAL MEDICINE

## 2019-02-16 PROCEDURE — 82962 GLUCOSE BLOOD TEST: CPT

## 2019-02-16 PROCEDURE — A9270 NON-COVERED ITEM OR SERVICE: HCPCS | Performed by: INTERNAL MEDICINE

## 2019-02-16 PROCEDURE — 99225 PR SUBSEQUENT OBSERVATION CARE,LEVEL II: CPT | Performed by: INTERNAL MEDICINE

## 2019-02-16 RX ORDER — HYDROXYZINE 50 MG/1
50 TABLET, FILM COATED ORAL 3 TIMES DAILY PRN
Status: DISCONTINUED | OUTPATIENT
Start: 2019-02-16 | End: 2019-02-19

## 2019-02-16 RX ORDER — DEXTROSE MONOHYDRATE 25 G/50ML
25 INJECTION, SOLUTION INTRAVENOUS
Status: DISCONTINUED | OUTPATIENT
Start: 2019-02-16 | End: 2019-02-19 | Stop reason: HOSPADM

## 2019-02-16 RX ADMIN — INSULIN HUMAN 2 UNITS: 100 INJECTION, SOLUTION PARENTERAL at 19:01

## 2019-02-16 RX ADMIN — PRAZOSIN HYDROCHLORIDE 2 MG: 2 CAPSULE ORAL at 21:57

## 2019-02-16 RX ADMIN — CLOPIDOGREL 75 MG: 75 TABLET, FILM COATED ORAL at 08:28

## 2019-02-16 RX ADMIN — METFORMIN HYDROCHLORIDE 1000 MG: 500 TABLET ORAL at 10:31

## 2019-02-16 RX ADMIN — INSULIN HUMAN 2 UNITS: 100 INJECTION, SOLUTION PARENTERAL at 13:09

## 2019-02-16 RX ADMIN — METOPROLOL TARTRATE 25 MG: 25 TABLET, FILM COATED ORAL at 08:29

## 2019-02-16 RX ADMIN — METFORMIN HYDROCHLORIDE 1000 MG: 500 TABLET ORAL at 18:55

## 2019-02-16 RX ADMIN — HYDROXYZINE HYDROCHLORIDE 50 MG: 50 TABLET, FILM COATED ORAL at 18:58

## 2019-02-16 RX ADMIN — LISINOPRIL 40 MG: 20 TABLET ORAL at 08:29

## 2019-02-16 RX ADMIN — OMEPRAZOLE 20 MG: 20 CAPSULE, DELAYED RELEASE ORAL at 08:28

## 2019-02-16 RX ADMIN — METOPROLOL TARTRATE 25 MG: 25 TABLET, FILM COATED ORAL at 18:55

## 2019-02-16 RX ADMIN — HYDROXYZINE HYDROCHLORIDE 50 MG: 50 TABLET, FILM COATED ORAL at 10:40

## 2019-02-16 RX ADMIN — OLANZAPINE 5 MG: 5 TABLET, FILM COATED ORAL at 18:56

## 2019-02-16 RX ADMIN — ASPIRIN 81 MG: 81 TABLET, COATED ORAL at 08:28

## 2019-02-16 RX ADMIN — BUPROPION HYDROCHLORIDE 150 MG: 150 TABLET, FILM COATED, EXTENDED RELEASE ORAL at 08:30

## 2019-02-16 RX ADMIN — ATORVASTATIN CALCIUM 80 MG: 80 TABLET, FILM COATED ORAL at 18:55

## 2019-02-16 ASSESSMENT — ENCOUNTER SYMPTOMS
VOMITING: 0
DEPRESSION: 1
CHILLS: 0
FEVER: 0
COUGH: 0
INSOMNIA: 1
ABDOMINAL PAIN: 0
NERVOUS/ANXIOUS: 1
NAUSEA: 0
BLURRED VISION: 0

## 2019-02-16 NOTE — PROGRESS NOTES
Bedside report received from day RN; assumed pt care. Pt assessment complete. Pt A&Ox4. Reviewed plan of care with pt. Pt is medical. Chart and labs reviewed. Bed in lowest position, and 2 side rails up. Pt educated on call light; call light with in reach.

## 2019-02-16 NOTE — CARE PLAN
Problem: Safety  Goal: Will remain free from falls  Outcome: PROGRESSING AS EXPECTED  Pt safety precautions in place; bed in lowest lock position, 2 side rails up, non slip socks on, call light within reach- educated on when and how to use call light.      Problem: Knowledge Deficit  Goal: Knowledge of the prescribed therapeutic regimen will improve  Outcome: PROGRESSING AS EXPECTED  Pt is more accepting of the teaching from this RN; and shows an understanding of his treatment.

## 2019-02-16 NOTE — PROGRESS NOTES
Report received at bedside, patient care assumed, tele box off pt is medical. 1:1 sitter in place. Pt AAO x 4, no signs of distress noted at this time. POC discussed with pt and all questions answered. No complaint of pain at this time. Pt denies any additional needs at this time. Bed in lowest position, bed alarm on, pt educated on fall risk and verbalized understanding. Will continue to monitor.

## 2019-02-16 NOTE — PROGRESS NOTES
Hospital Medicine Daily Progress Note    Date of Service  2/16/2019    Chief Complaint  58 y.o. male admitted 2/13/2019 with CP and SI    Hospital Course  Chest pain-continues to remain without recurrence.     SI-not quite as depressed today. Would like his atarax increased since it helps his anxiety some. Apparently, he takes upwards of 300 mg daily at home. Still hearing voices.     Consultants/Specialty  Psych    Code Status  fcfc    Disposition  TELE    Review of Systems  Review of Systems   Constitutional: Positive for malaise/fatigue (minor improvement). Negative for chills and fever.   Eyes: Negative for blurred vision.   Respiratory: Negative for cough.    Cardiovascular: Negative for chest pain.   Gastrointestinal: Negative for abdominal pain, nausea and vomiting.   Genitourinary: Negative for dysuria.   Psychiatric/Behavioral: Positive for depression and suicidal ideas. The patient is nervous/anxious and has insomnia.         Very small improvement today   All other systems reviewed and are negative.       Physical Exam  Temp:  [36.3 °C (97.3 °F)-36.6 °C (97.9 °F)] 36.4 °C (97.6 °F)  Pulse:  [68-81] 68  Resp:  [16-20] 20  BP: (125-153)/(79-96) 126/83  SpO2:  [95 %-100 %] 95 %    Physical Exam   Constitutional: He is oriented to person, place, and time. He appears well-developed and well-nourished.   Thin, disheveled, appears older than stated age  A bit more emotive today   HENT:   Head: Normocephalic and atraumatic.   Mouth/Throat: No oropharyngeal exudate.   Eyes: Pupils are equal, round, and reactive to light. No scleral icterus.   Neck: Normal range of motion. Neck supple.   Cardiovascular: Normal rate, regular rhythm, normal heart sounds and intact distal pulses.    Pulmonary/Chest: Effort normal and breath sounds normal. No stridor. No respiratory distress.   Abdominal: Soft. Bowel sounds are normal. He exhibits no distension.   Musculoskeletal: Normal range of motion. He exhibits no tenderness.    Neurological: He is alert and oriented to person, place, and time. Coordination normal.   Skin: Skin is warm and dry. No rash noted.   Psychiatric:   Depressed  Hearing voices per patient  Slightly less withdrawn today   Nursing note and vitals reviewed.      Fluids    Intake/Output Summary (Last 24 hours) at 02/16/19 1302  Last data filed at 02/16/19 0100   Gross per 24 hour   Intake              540 ml   Output                0 ml   Net              540 ml       Laboratory  Recent Labs      02/14/19   0321   WBC  6.1   RBC  4.09*   HEMOGLOBIN  11.7*   HEMATOCRIT  36.6*   MCV  89.5   MCH  28.6   MCHC  32.0*   RDW  53.2*   PLATELETCT  292   MPV  9.8     Recent Labs      02/14/19   0322  02/15/19   0241   SODIUM  134*  137   POTASSIUM  3.8  4.0   CHLORIDE  105  106   CO2  22  25   GLUCOSE  306*  196*   BUN  15  18   CREATININE  1.51*  1.33   CALCIUM  8.9  8.4*             Recent Labs      02/14/19   0322   TRIGLYCERIDE  81   HDL  64   LDL  40       Imaging  DX-CHEST-LIMITED (1 VIEW)   Final Result      No acute cardiopulmonary disease.           Assessment/Plan  Chest pain- (present on admission)   Assessment & Plan    Patient had a STEMI in December 2018 for which he underwent stent placement to the RCA  -resolved  Suspect secondary to uncontrolled hypertension  -no aCS, likely 2/2 stress, possibly MSK  -consider discontinuing TELE tomorrow  -stop checking troponins, no CP currently  -continue all current cardiac medications       Suicidal ideation- (present on admission)   Assessment & Plan    Patient has been placed on legal hold which was extended   -remains depressed and having auditory hallucuinations, affect slightly better today  Psychiatry has been consulted  -stopped seroquel  -start zyprexa, adjust prN  -added atarax, now 50 mg TID PRN, adjust upwards PRN     Type 2 diabetes mellitus with hyperglycemia, without long-term current use of insulin (HCC)- (present on admission)   Assessment & Plan    -sugars  are a bit better today  Adjust PRN to keep BS less than 150  continue on insulin sliding scale with serial Accu-Checks  A1c-8.2 indicating somewhat poor control at [facility]  Hypoglycemic protocol in place  -continue outpatient metofmirn       PTSD (post-traumatic stress disorder)- (present on admission)   Assessment & Plan    Continue Wellbutrin     Hypertension- (present on admission)   Assessment & Plan    Much better control achieved today  Continue lisinopril, metoprolol and Minipress  IV metoprolol and Vasotec as needed for SBP greater than 160     CAD (coronary artery disease)- (present on admission)   Assessment & Plan    Recent STEMI with stent placement to RCA  Aspirin, Plavix and atorvastatin     Tobacco abuse- (present on admission)   Assessment & Plan    Cessation counseling given            VTE prophylaxis: heparin

## 2019-02-16 NOTE — PROGRESS NOTES
Hospital Medicine Daily Progress Note    Date of Service  2/15/2019    Chief Complaint  58 y.o. male admitted 2/13/2019 with CP and SI    Hospital Course  Chest pain-no issues and no recurrence.     SI-remains very depressed and is hearing voices suggesting he should hurt himself. Adherent to his psychiatric medications and he really wants to go behavioral health.     Consultants/Specialty  Psych    Code Status  fcfc    Disposition  TELE    Review of Systems  Review of Systems   Constitutional: Positive for malaise/fatigue (no change). Negative for fever.   Eyes: Negative for blurred vision.   Respiratory: Negative for shortness of breath.    Cardiovascular: Negative for palpitations.   Genitourinary: Negative for dysuria.   Psychiatric/Behavioral: Positive for depression and suicidal ideas. The patient is nervous/anxious and has insomnia.         Unchanged again today   All other systems reviewed and are negative.       Physical Exam  Temp:  [36.2 °C (97.2 °F)-36.9 °C (98.4 °F)] 36.5 °C (97.7 °F)  Pulse:  [69-76] 74  Resp:  [16-20] 16  BP: (140-179)/() 148/93  SpO2:  [97 %-100 %] 100 %    Physical Exam   Constitutional: He is oriented to person, place, and time. He appears well-developed and well-nourished.   Thin, disheveled, appears older than stated age  Very flat affect today   HENT:   Head: Normocephalic and atraumatic.   Mouth/Throat: No oropharyngeal exudate.   Eyes: Pupils are equal, round, and reactive to light. Right eye exhibits no discharge. Left eye exhibits no discharge.   Neck: Normal range of motion. Neck supple.   Cardiovascular: Normal rate, regular rhythm, normal heart sounds and intact distal pulses.  Exam reveals no gallop.    Pulmonary/Chest: Effort normal and breath sounds normal. No stridor. No respiratory distress.   Abdominal: Soft. Bowel sounds are normal. There is no tenderness.   Musculoskeletal: Normal range of motion. He exhibits no edema.   Neurological: He is alert and  oriented to person, place, and time. No cranial nerve deficit.   Skin: Skin is warm and dry. No rash noted.   Psychiatric:   Depressed  Hearing voices per patient  Withdrawn   Nursing note and vitals reviewed.      Fluids    Intake/Output Summary (Last 24 hours) at 02/15/19 1739  Last data filed at 02/15/19 0530   Gross per 24 hour   Intake              960 ml   Output                0 ml   Net              960 ml       Laboratory  Recent Labs      02/13/19 0824 02/14/19   0321   WBC  6.9  6.1   RBC  4.39*  4.09*   HEMOGLOBIN  12.5*  11.7*   HEMATOCRIT  38.2*  36.6*   MCV  87.0  89.5   MCH  28.5  28.6   MCHC  32.7*  32.0*   RDW  51.0*  53.2*   PLATELETCT  314  292   MPV  9.3  9.8     Recent Labs      02/13/19   0824  02/14/19   0322  02/15/19   0241   SODIUM  130*  134*  137   POTASSIUM  3.7  3.8  4.0   CHLORIDE  97  105  106   CO2  25  22  25   GLUCOSE  227*  306*  196*   BUN  12  15  18   CREATININE  1.23  1.51*  1.33   CALCIUM  9.1  8.9  8.4*     Recent Labs      02/13/19 0824   APTT  29.9   INR  0.96     Recent Labs      02/13/19 0824   BNPBTYPENAT  98     Recent Labs      02/14/19 0322   TRIGLYCERIDE  81   HDL  64   LDL  40       Imaging  DX-CHEST-LIMITED (1 VIEW)   Final Result      No acute cardiopulmonary disease.           Assessment/Plan  Chest pain- (present on admission)   Assessment & Plan    Patient had a STEMI in December 2018 for which he underwent stent placement to the RCA  -resolved  Suspect secondary to uncontrolled hypertension  -no aCS, likely 2/2 stress, possibly MSK  -consider discontinuing TELE tomorrow  -stop checking troponins, no CP currently  -continue all current cardiac medications       Suicidal ideation- (present on admission)   Assessment & Plan    Patient has been placed on legal hold which was extended   -remains depressed and having auditory hallucuinations  Psychiatry has been consulted  -stopped seroquel  -start zyprexa, adjust prN     Type 2 diabetes mellitus with  hyperglycemia, without long-term current use of insulin (HCC)- (present on admission)   Assessment & Plan    Uncontrolled with hyperglycemia, persists again today  Adjust PRN to keep BS less than 150  Start on insulin sliding scale with serial Accu-Checks  A1c-8.2 indicating somewhat poor control at [facility]  Hypoglycemic protocol in place  -restart outpatient metofmirn       PTSD (post-traumatic stress disorder)- (present on admission)   Assessment & Plan    Continue Wellbutrin     Hypertension- (present on admission)   Assessment & Plan    Much better control achieved today  Continue lisinopril, metoprolol and Minipress  IV metoprolol and Vasotec as needed for SBP greater than 160     CAD (coronary artery disease)- (present on admission)   Assessment & Plan    Recent STEMI with stent placement to RCA  Aspirin, Plavix and atorvastatin     Tobacco abuse- (present on admission)   Assessment & Plan    Cessation counseling given            VTE prophylaxis: heparin

## 2019-02-17 ENCOUNTER — APPOINTMENT (OUTPATIENT)
Dept: RADIOLOGY | Facility: MEDICAL CENTER | Age: 59
End: 2019-02-17
Attending: INTERNAL MEDICINE
Payer: MEDICARE

## 2019-02-17 PROBLEM — M25.552 LEFT HIP PAIN: Status: ACTIVE | Noted: 2019-02-17

## 2019-02-17 LAB
GLUCOSE BLD-MCNC: 127 MG/DL (ref 65–99)
GLUCOSE BLD-MCNC: 137 MG/DL (ref 65–99)
GLUCOSE BLD-MCNC: 202 MG/DL (ref 65–99)
GLUCOSE BLD-MCNC: 231 MG/DL (ref 65–99)

## 2019-02-17 PROCEDURE — 73502 X-RAY EXAM HIP UNI 2-3 VIEWS: CPT | Mod: RT

## 2019-02-17 PROCEDURE — A9270 NON-COVERED ITEM OR SERVICE: HCPCS | Performed by: INTERNAL MEDICINE

## 2019-02-17 PROCEDURE — 700101 HCHG RX REV CODE 250: Performed by: INTERNAL MEDICINE

## 2019-02-17 PROCEDURE — 82962 GLUCOSE BLOOD TEST: CPT | Mod: 91

## 2019-02-17 PROCEDURE — 700102 HCHG RX REV CODE 250 W/ 637 OVERRIDE(OP): Performed by: INTERNAL MEDICINE

## 2019-02-17 PROCEDURE — A9270 NON-COVERED ITEM OR SERVICE: HCPCS | Performed by: PSYCHIATRY & NEUROLOGY

## 2019-02-17 PROCEDURE — 96372 THER/PROPH/DIAG INJ SC/IM: CPT

## 2019-02-17 PROCEDURE — 700102 HCHG RX REV CODE 250 W/ 637 OVERRIDE(OP): Performed by: PSYCHIATRY & NEUROLOGY

## 2019-02-17 PROCEDURE — 99225 PR SUBSEQUENT OBSERVATION CARE,LEVEL II: CPT | Performed by: INTERNAL MEDICINE

## 2019-02-17 PROCEDURE — G0378 HOSPITAL OBSERVATION PER HR: HCPCS

## 2019-02-17 RX ORDER — LIDOCAINE 50 MG/G
1 PATCH TOPICAL EVERY 24 HOURS
Status: DISCONTINUED | OUTPATIENT
Start: 2019-02-17 | End: 2019-02-19 | Stop reason: HOSPADM

## 2019-02-17 RX ORDER — GABAPENTIN 100 MG/1
200 CAPSULE ORAL EVERY EVENING
Status: DISCONTINUED | OUTPATIENT
Start: 2019-02-17 | End: 2019-02-19

## 2019-02-17 RX ORDER — ACETAMINOPHEN 500 MG
500 TABLET ORAL EVERY 6 HOURS
Status: DISCONTINUED | OUTPATIENT
Start: 2019-02-17 | End: 2019-02-19 | Stop reason: HOSPADM

## 2019-02-17 RX ORDER — TRAMADOL HYDROCHLORIDE 50 MG/1
100 TABLET ORAL EVERY 6 HOURS PRN
Status: DISCONTINUED | OUTPATIENT
Start: 2019-02-17 | End: 2019-02-19 | Stop reason: HOSPADM

## 2019-02-17 RX ORDER — BUPROPION HYDROCHLORIDE 150 MG/1
300 TABLET, EXTENDED RELEASE ORAL DAILY
Status: DISCONTINUED | OUTPATIENT
Start: 2019-02-17 | End: 2019-02-19 | Stop reason: HOSPADM

## 2019-02-17 RX ADMIN — INSULIN HUMAN 2 UNITS: 100 INJECTION, SOLUTION PARENTERAL at 17:42

## 2019-02-17 RX ADMIN — CLOPIDOGREL 75 MG: 75 TABLET, FILM COATED ORAL at 05:51

## 2019-02-17 RX ADMIN — LIDOCAINE 1 PATCH: 50 PATCH TOPICAL at 14:09

## 2019-02-17 RX ADMIN — ATORVASTATIN CALCIUM 80 MG: 80 TABLET, FILM COATED ORAL at 17:35

## 2019-02-17 RX ADMIN — ASPIRIN 81 MG: 81 TABLET, COATED ORAL at 05:52

## 2019-02-17 RX ADMIN — METFORMIN HYDROCHLORIDE 1000 MG: 500 TABLET ORAL at 08:44

## 2019-02-17 RX ADMIN — OMEPRAZOLE 20 MG: 20 CAPSULE, DELAYED RELEASE ORAL at 05:51

## 2019-02-17 RX ADMIN — METOPROLOL TARTRATE 25 MG: 25 TABLET, FILM COATED ORAL at 17:34

## 2019-02-17 RX ADMIN — PRAZOSIN HYDROCHLORIDE 2 MG: 2 CAPSULE ORAL at 20:14

## 2019-02-17 RX ADMIN — ACETAMINOPHEN 500 MG: 500 TABLET ORAL at 17:35

## 2019-02-17 RX ADMIN — OLANZAPINE 5 MG: 5 TABLET, FILM COATED ORAL at 17:39

## 2019-02-17 RX ADMIN — BUPROPION HYDROCHLORIDE 150 MG: 150 TABLET, EXTENDED RELEASE ORAL at 08:57

## 2019-02-17 RX ADMIN — METFORMIN HYDROCHLORIDE 1000 MG: 500 TABLET ORAL at 17:35

## 2019-02-17 RX ADMIN — LISINOPRIL 40 MG: 20 TABLET ORAL at 05:52

## 2019-02-17 RX ADMIN — TRAMADOL HYDROCHLORIDE 100 MG: 50 TABLET, FILM COATED ORAL at 13:23

## 2019-02-17 RX ADMIN — BUPROPION HYDROCHLORIDE 150 MG: 150 TABLET, FILM COATED, EXTENDED RELEASE ORAL at 05:52

## 2019-02-17 RX ADMIN — METOPROLOL TARTRATE 25 MG: 25 TABLET, FILM COATED ORAL at 05:52

## 2019-02-17 RX ADMIN — INSULIN HUMAN 2 UNITS: 100 INJECTION, SOLUTION PARENTERAL at 08:44

## 2019-02-17 ASSESSMENT — ENCOUNTER SYMPTOMS
FEVER: 0
NERVOUS/ANXIOUS: 1
PALPITATIONS: 0
ABDOMINAL PAIN: 0
DEPRESSION: 1
DIARRHEA: 0
BLURRED VISION: 0
INSOMNIA: 1
SHORTNESS OF BREATH: 0
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
## 2019-02-17 NOTE — ASSESSMENT & PLAN NOTE
-acute on chronic, no neuro deficits  -Xray of hip shows mild OA  -from prior injury  -multi modal pain therapy with scheduled APAP, lidocaine patches, tramadol PRN and low dose PM gabapentin, no adjustments today

## 2019-02-17 NOTE — DISCHARGE PLANNING
LSW discussed pt with bedside RN and inquired if pt was medically clear for possible transfer in inpt psych. RN unsure and will discuss with MD. LSW informed RN medical clearance on Legal Hold documents will need to be signed when pt is medically clear.

## 2019-02-17 NOTE — PROGRESS NOTES
Received bedside report from RN, pt care assumed, VSS, pt assessment complete. Pt AAOx4, chronic c/o  Pain to right hip at this time. No signs of acute distress noted at this time. POC discussed with pt and verbalizes no questions. Pt denies any additional needs at this time. Bed in lowest position, bed alarm on, pt educated on fall risk and verbalized understanding, call light within reach, hourly rounding initiated. Not on the monitor. 1-1 sitter PSA at bedside.

## 2019-02-17 NOTE — PROGRESS NOTES
Bedside report received from day shift RN. Patient awake, alert and oriented x 4. Resting in bed. On room air. Patient requesting a turkey sandwich because he did not eat his dinner, CNA going to get sandwich from cafeteria for patient at this time. Fall precautions in place. Sitter at bedside. Call light within reach. Will continue to monitor.

## 2019-02-17 NOTE — CARE PLAN
Problem: Safety  Goal: Will remain free from injury  Outcome: NOT MET  No signs of injury noted  Goal: Will remain free from falls  Outcome: PROGRESSING AS EXPECTED  Appropriate fall precautions in place. Bed locked in lowest position, treaded socks on, bed alarm on, 1:1 sitter at bedside for safety    Problem: Infection  Goal: Will remain free from infection  Outcome: PROGRESSING AS EXPECTED  No signs or symptoms of infection noted

## 2019-02-18 LAB
GLUCOSE BLD-MCNC: 142 MG/DL (ref 65–99)
GLUCOSE BLD-MCNC: 164 MG/DL (ref 65–99)
GLUCOSE BLD-MCNC: 167 MG/DL (ref 65–99)
GLUCOSE BLD-MCNC: 191 MG/DL (ref 65–99)

## 2019-02-18 PROCEDURE — 99225 PR SUBSEQUENT OBSERVATION CARE,LEVEL II: CPT | Performed by: INTERNAL MEDICINE

## 2019-02-18 PROCEDURE — 700102 HCHG RX REV CODE 250 W/ 637 OVERRIDE(OP): Performed by: INTERNAL MEDICINE

## 2019-02-18 PROCEDURE — A9270 NON-COVERED ITEM OR SERVICE: HCPCS | Performed by: INTERNAL MEDICINE

## 2019-02-18 PROCEDURE — 700101 HCHG RX REV CODE 250: Performed by: INTERNAL MEDICINE

## 2019-02-18 PROCEDURE — 96372 THER/PROPH/DIAG INJ SC/IM: CPT

## 2019-02-18 PROCEDURE — 82962 GLUCOSE BLOOD TEST: CPT | Mod: 91

## 2019-02-18 PROCEDURE — G0378 HOSPITAL OBSERVATION PER HR: HCPCS

## 2019-02-18 PROCEDURE — A9270 NON-COVERED ITEM OR SERVICE: HCPCS | Performed by: PSYCHIATRY & NEUROLOGY

## 2019-02-18 PROCEDURE — 700102 HCHG RX REV CODE 250 W/ 637 OVERRIDE(OP): Performed by: PSYCHIATRY & NEUROLOGY

## 2019-02-18 RX ORDER — IBUPROFEN 200 MG
200 TABLET ORAL EVERY 6 HOURS PRN
Status: DISCONTINUED | OUTPATIENT
Start: 2019-02-18 | End: 2019-02-19 | Stop reason: HOSPADM

## 2019-02-18 RX ORDER — TRAZODONE HYDROCHLORIDE 50 MG/1
50 TABLET ORAL
Status: DISCONTINUED | OUTPATIENT
Start: 2019-02-18 | End: 2019-02-19 | Stop reason: HOSPADM

## 2019-02-18 RX ORDER — AMLODIPINE BESYLATE 10 MG/1
10 TABLET ORAL
Status: DISCONTINUED | OUTPATIENT
Start: 2019-02-18 | End: 2019-02-19 | Stop reason: HOSPADM

## 2019-02-18 RX ADMIN — OMEPRAZOLE 20 MG: 20 CAPSULE, DELAYED RELEASE ORAL at 06:45

## 2019-02-18 RX ADMIN — ASPIRIN 81 MG: 81 TABLET, COATED ORAL at 06:45

## 2019-02-18 RX ADMIN — ATORVASTATIN CALCIUM 80 MG: 80 TABLET, FILM COATED ORAL at 17:24

## 2019-02-18 RX ADMIN — LISINOPRIL 40 MG: 20 TABLET ORAL at 06:46

## 2019-02-18 RX ADMIN — INSULIN HUMAN 1 UNITS: 100 INJECTION, SOLUTION PARENTERAL at 18:16

## 2019-02-18 RX ADMIN — HYDROXYZINE HYDROCHLORIDE 50 MG: 50 TABLET, FILM COATED ORAL at 11:58

## 2019-02-18 RX ADMIN — PRAZOSIN HYDROCHLORIDE 2 MG: 2 CAPSULE ORAL at 21:27

## 2019-02-18 RX ADMIN — METOPROLOL TARTRATE 25 MG: 25 TABLET, FILM COATED ORAL at 06:45

## 2019-02-18 RX ADMIN — METFORMIN HYDROCHLORIDE 1000 MG: 500 TABLET ORAL at 08:09

## 2019-02-18 RX ADMIN — OLANZAPINE 5 MG: 5 TABLET, FILM COATED ORAL at 17:26

## 2019-02-18 RX ADMIN — BUPROPION HYDROCHLORIDE 300 MG: 150 TABLET, EXTENDED RELEASE ORAL at 06:45

## 2019-02-18 RX ADMIN — METFORMIN HYDROCHLORIDE 1000 MG: 500 TABLET ORAL at 17:24

## 2019-02-18 RX ADMIN — LIDOCAINE 1 PATCH: 50 PATCH TOPICAL at 13:41

## 2019-02-18 RX ADMIN — TRAZODONE HYDROCHLORIDE 50 MG: 50 TABLET ORAL at 21:27

## 2019-02-18 RX ADMIN — INSULIN HUMAN 1 UNITS: 100 INJECTION, SOLUTION PARENTERAL at 13:39

## 2019-02-18 RX ADMIN — ACETAMINOPHEN 500 MG: 500 TABLET ORAL at 17:24

## 2019-02-18 RX ADMIN — METOPROLOL TARTRATE 25 MG: 25 TABLET, FILM COATED ORAL at 17:24

## 2019-02-18 RX ADMIN — INSULIN HUMAN 1 UNITS: 100 INJECTION, SOLUTION PARENTERAL at 08:04

## 2019-02-18 RX ADMIN — CLOPIDOGREL 75 MG: 75 TABLET, FILM COATED ORAL at 06:45

## 2019-02-18 ASSESSMENT — ENCOUNTER SYMPTOMS
DEPRESSION: 1
BLURRED VISION: 0
PALPITATIONS: 0
VOMITING: 0
FEVER: 0
SHORTNESS OF BREATH: 0
NERVOUS/ANXIOUS: 0
INSOMNIA: 1

## 2019-02-18 NOTE — PROGRESS NOTES
Bedside report received from day shift RN. Patient resting in bed, appears asleep. On room air. No signs of discomfort or distress noted at this time. 1:1 sitter at bedside. Fall precautions in place. Will continue to monitor.

## 2019-02-18 NOTE — PROGRESS NOTES
Hospital Medicine Daily Progress Note    Date of Service  2/18/2019    Chief Complaint  58 y.o. male admitted 2/13/2019 with CP and SI    Hospital Course  Chest pain-resolved    L hip pain-xray of hip shows mild OA, no fracture. He still has pain but no new neurological issues at this time.     SI-mood remains about the same. Now he wants to go to [place].     Consultants/Specialty  Psych    Code Status  fcfc    Disposition  TELE    Review of Systems  Review of Systems   Constitutional: Positive for malaise/fatigue (slow daily improvement). Negative for fever.   Eyes: Negative for blurred vision.   Respiratory: Negative for shortness of breath.    Cardiovascular: Negative for palpitations.   Gastrointestinal: Negative for vomiting.   Genitourinary: Negative for dysuria.   Musculoskeletal: Positive for joint pain (L hip).        Remains mostly the same today   Psychiatric/Behavioral: Positive for depression. Negative for suicidal ideas. The patient has insomnia. The patient is not nervous/anxious.         No obvious improvements noted today   All other systems reviewed and are negative.       Physical Exam  Temp:  [36.2 °C (97.2 °F)-36.9 °C (98.5 °F)] 36.7 °C (98 °F)  Pulse:  [77-85] 77  Resp:  [18-20] 18  BP: (125-161)/(71-92) 148/81  SpO2:  [95 %-97 %] 96 %    Physical Exam   Constitutional: He is oriented to person, place, and time. He appears well-developed and well-nourished.   Thin, disheveled, appears older than stated age  A bit more emotive today   HENT:   Head: Normocephalic and atraumatic.   Mouth/Throat: No oropharyngeal exudate.   Eyes: Pupils are equal, round, and reactive to light. Right eye exhibits no discharge. Left eye exhibits no discharge.   Neck: Normal range of motion. Neck supple.   Cardiovascular: Normal rate, regular rhythm, normal heart sounds and intact distal pulses.    Pulmonary/Chest: Effort normal and breath sounds normal. No stridor. He has no wheezes. He has no rales.    Abdominal: Soft. Bowel sounds are normal. There is no tenderness.   Musculoskeletal: Normal range of motion. He exhibits no edema or tenderness.   Neurological: He is alert and oriented to person, place, and time. No cranial nerve deficit.   Skin: Skin is warm and dry. No rash noted.   Psychiatric:   Depressed, remains interactive   Nursing note and vitals reviewed.      Fluids    Intake/Output Summary (Last 24 hours) at 02/18/19 1408  Last data filed at 02/18/19 0046   Gross per 24 hour   Intake                0 ml   Output              875 ml   Net             -875 ml       Laboratory                        Imaging  DX-HIP-COMPLETE - UNILATERAL 2+ RIGHT   Final Result      1.  No acute findings.      2.  Mild bilateral hip osteoarthritis.      DX-CHEST-LIMITED (1 VIEW)   Final Result      No acute cardiopulmonary disease.           Assessment/Plan  Chest pain- (present on admission)   Assessment & Plan    Patient had a STEMI in December 2018 for which he underwent stent placement to the RCA  -resolved  Suspect secondary to uncontrolled hypertension  -no aCS, likely 2/2 stress, possibly MSK  -consider discontinuing TELE tomorrow  -stop checking troponins, no CP currently  -continue all current cardiac medications       Suicidal ideation- (present on admission)   Assessment & Plan    Patient has been placed on legal hold which was extended   -remains depressed and having auditory hallucuinations, mood has remain unchanged  Psychiatry has been consulted  -stopped seroquel  -start zyprexa, adjust prN, continue current dosing  -continue atarax, now 50 mg TID PRN, continue current dosing     Left hip pain   Assessment & Plan    -acute on chronic, no neuro deficits  -Xray of hip shows mild OA  -from prior injury  -multi modal pain therapy with scheduled APAP, lidocaine patches, tramadol PRN and low dose PM gabapentin, no adjustments today     Type 2 diabetes mellitus with hyperglycemia, without long-term current use of  insulin (HCC)- (present on admission)   Assessment & Plan    -sugars remain in the decent range again today  Adjust PRN to keep BS less than 150  continue on insulin sliding scale with serial Accu-Checks  A1c-8.2 indicating somewhat poor control at [facility]  Hypoglycemic protocol in place  -continue outpatient metofmirn       PTSD (post-traumatic stress disorder)- (present on admission)   Assessment & Plan    Continue Wellbutrin     Hypertension- (present on admission)   Assessment & Plan    -remains well controlled  Continue lisinopril, metoprolol and Minipress  IV metoprolol and Vasotec as needed for SBP greater than 160     CAD (coronary artery disease)- (present on admission)   Assessment & Plan    Recent STEMI with stent placement to RCA  Aspirin, Plavix and atorvastatin     Tobacco abuse- (present on admission)   Assessment & Plan    Cessation counseling given            VTE prophylaxis: heparin

## 2019-02-18 NOTE — CARE PLAN
Problem: Safety  Goal: Will remain free from injury  Outcome: PROGRESSING AS EXPECTED  No signs of injury noted  Goal: Will remain free from falls  Outcome: PROGRESSING AS EXPECTED  Appropriate fall precautions in place. Bed locked in lowest position, bed alarm on, treaded socks on. 1:1 sitter at bedside for safety.    Problem: Infection  Goal: Will remain free from infection  Outcome: PROGRESSING AS EXPECTED  No signs or symptoms of infection noted

## 2019-02-18 NOTE — CARE PLAN
Problem: Safety  Goal: Will remain free from injury  Outcome: PROGRESSING AS EXPECTED  Pt is AOx4, ambulatory and has a 1:1 sitter for mobility needs.    Problem: Pain Management  Goal: Pain level will decrease to patient's comfort goal  Outcome: PROGRESSING AS EXPECTED  Pt calls appropriately for pain medication, understands the pain scale and can rate his pain 0 to 10 when asked.

## 2019-02-18 NOTE — PROGRESS NOTES
Spoke with MISA re: pt wanting to be referred to a facility in [place], as well as c/o not having seen a psych Dr for four or five days. MISA states she will let the legal hold SW team know of the pt request and furthermore will call out regarding a visit from the psych team.

## 2019-02-19 VITALS
HEIGHT: 64 IN | HEART RATE: 74 BPM | WEIGHT: 121.03 LBS | SYSTOLIC BLOOD PRESSURE: 134 MMHG | OXYGEN SATURATION: 98 % | BODY MASS INDEX: 20.66 KG/M2 | TEMPERATURE: 98 F | RESPIRATION RATE: 18 BRPM | DIASTOLIC BLOOD PRESSURE: 88 MMHG

## 2019-02-19 PROBLEM — R07.9 CHEST PAIN: Status: RESOLVED | Noted: 2018-11-03 | Resolved: 2019-02-19

## 2019-02-19 LAB
GLUCOSE BLD-MCNC: 159 MG/DL (ref 65–99)
GLUCOSE BLD-MCNC: 173 MG/DL (ref 65–99)
GLUCOSE BLD-MCNC: 189 MG/DL (ref 65–99)

## 2019-02-19 PROCEDURE — A9270 NON-COVERED ITEM OR SERVICE: HCPCS | Performed by: INTERNAL MEDICINE

## 2019-02-19 PROCEDURE — 99214 OFFICE O/P EST MOD 30 MIN: CPT | Performed by: PSYCHIATRY & NEUROLOGY

## 2019-02-19 PROCEDURE — 700102 HCHG RX REV CODE 250 W/ 637 OVERRIDE(OP): Performed by: INTERNAL MEDICINE

## 2019-02-19 PROCEDURE — G0378 HOSPITAL OBSERVATION PER HR: HCPCS

## 2019-02-19 PROCEDURE — A9270 NON-COVERED ITEM OR SERVICE: HCPCS | Performed by: PSYCHIATRY & NEUROLOGY

## 2019-02-19 PROCEDURE — 82962 GLUCOSE BLOOD TEST: CPT | Mod: 91

## 2019-02-19 PROCEDURE — 700102 HCHG RX REV CODE 250 W/ 637 OVERRIDE(OP): Performed by: PSYCHIATRY & NEUROLOGY

## 2019-02-19 PROCEDURE — 96372 THER/PROPH/DIAG INJ SC/IM: CPT

## 2019-02-19 PROCEDURE — 700101 HCHG RX REV CODE 250: Performed by: INTERNAL MEDICINE

## 2019-02-19 PROCEDURE — 99217 PR OBSERVATION CARE DISCHARGE: CPT | Performed by: FAMILY MEDICINE

## 2019-02-19 RX ORDER — OLANZAPINE 5 MG/1
10 TABLET ORAL EVERY EVENING
Status: DISCONTINUED | OUTPATIENT
Start: 2019-02-19 | End: 2019-02-19 | Stop reason: HOSPADM

## 2019-02-19 RX ORDER — HYDROXYZINE PAMOATE 50 MG/1
50 CAPSULE ORAL
Qty: 30 CAP | Refills: 0 | Status: SHIPPED | OUTPATIENT
Start: 2019-02-19 | End: 2019-10-19

## 2019-02-19 RX ORDER — TRAZODONE HYDROCHLORIDE 50 MG/1
50 TABLET ORAL
Qty: 30 TAB | Refills: 3 | Status: SHIPPED | OUTPATIENT
Start: 2019-02-19 | End: 2019-10-19

## 2019-02-19 RX ORDER — IBUPROFEN 200 MG
200 TABLET ORAL EVERY 6 HOURS PRN
Qty: 30 TAB | Refills: 0 | Status: SHIPPED | OUTPATIENT
Start: 2019-02-19 | End: 2019-10-19

## 2019-02-19 RX ORDER — HYDROXYZINE PAMOATE 100 MG
100 CAPSULE ORAL
Qty: 30 CAP | Refills: 0 | Status: SHIPPED | OUTPATIENT
Start: 2019-02-19 | End: 2019-10-19

## 2019-02-19 RX ORDER — HYDROXYZINE PAMOATE 100 MG
100 CAPSULE ORAL NIGHTLY
Status: DISCONTINUED | OUTPATIENT
Start: 2019-02-19 | End: 2019-02-19 | Stop reason: HOSPADM

## 2019-02-19 RX ORDER — AMLODIPINE BESYLATE 10 MG/1
10 TABLET ORAL DAILY
Qty: 30 TAB | Refills: 0 | Status: SHIPPED | OUTPATIENT
Start: 2019-02-20

## 2019-02-19 RX ORDER — OLANZAPINE 10 MG/1
10 TABLET ORAL EVERY EVENING
Qty: 30 TAB | Refills: 0 | Status: SHIPPED | OUTPATIENT
Start: 2019-02-19 | End: 2019-10-19

## 2019-02-19 RX ORDER — HYDROXYZINE PAMOATE 50 MG/1
50 CAPSULE ORAL
Status: DISCONTINUED | OUTPATIENT
Start: 2019-02-19 | End: 2019-02-19 | Stop reason: HOSPADM

## 2019-02-19 RX ADMIN — BUPROPION HYDROCHLORIDE 300 MG: 150 TABLET, EXTENDED RELEASE ORAL at 05:34

## 2019-02-19 RX ADMIN — METOPROLOL TARTRATE 25 MG: 25 TABLET, FILM COATED ORAL at 05:35

## 2019-02-19 RX ADMIN — ACETAMINOPHEN 500 MG: 500 TABLET ORAL at 05:36

## 2019-02-19 RX ADMIN — ASPIRIN 81 MG: 81 TABLET, COATED ORAL at 05:36

## 2019-02-19 RX ADMIN — METOPROLOL TARTRATE 25 MG: 25 TABLET, FILM COATED ORAL at 17:07

## 2019-02-19 RX ADMIN — METFORMIN HYDROCHLORIDE 1000 MG: 500 TABLET ORAL at 09:27

## 2019-02-19 RX ADMIN — OLANZAPINE 10 MG: 5 TABLET, FILM COATED ORAL at 17:12

## 2019-02-19 RX ADMIN — HYDROXYZINE PAMOATE 50 MG: 50 CAPSULE ORAL at 10:25

## 2019-02-19 RX ADMIN — INSULIN HUMAN 1 UNITS: 100 INJECTION, SOLUTION PARENTERAL at 12:15

## 2019-02-19 RX ADMIN — AMLODIPINE BESYLATE 10 MG: 10 TABLET ORAL at 05:37

## 2019-02-19 RX ADMIN — CLOPIDOGREL 75 MG: 75 TABLET, FILM COATED ORAL at 05:35

## 2019-02-19 RX ADMIN — LIDOCAINE 1 PATCH: 50 PATCH TOPICAL at 13:11

## 2019-02-19 RX ADMIN — TRAMADOL HYDROCHLORIDE 100 MG: 50 TABLET, FILM COATED ORAL at 12:13

## 2019-02-19 RX ADMIN — OMEPRAZOLE 20 MG: 20 CAPSULE, DELAYED RELEASE ORAL at 05:33

## 2019-02-19 RX ADMIN — ATORVASTATIN CALCIUM 80 MG: 80 TABLET, FILM COATED ORAL at 17:07

## 2019-02-19 RX ADMIN — LISINOPRIL 40 MG: 20 TABLET ORAL at 05:35

## 2019-02-19 RX ADMIN — ACETAMINOPHEN 650 MG: 325 TABLET, FILM COATED ORAL at 09:27

## 2019-02-19 NOTE — DISCHARGE PLANNING
Agency/Facility Name: [facility]   Spoke To: Quinton  Outcome: Pt has been accepted by Dr. Mueller. Requesting to have transferred at any time today. Contacted RN ROEL Gonzalez and relayed information.

## 2019-02-19 NOTE — CARE PLAN
Problem: Communication  Goal: The ability to communicate needs accurately and effectively will improve  Outcome: PROGRESSING SLOWER THAN EXPECTED      Problem: Infection  Goal: Will remain free from infection  Outcome: PROGRESSING AS EXPECTED  Pt educated on importance of hand hygiene and verbalized understanding. Hand washing performed before and after patient contact. No other s/sx of infection noted.

## 2019-02-19 NOTE — DISCHARGE PLANNING
Agency/Facility Name: [facility], [facility], [facility], [place]  Referral sent per Choice form @ 9955

## 2019-02-19 NOTE — PSYCHIATRY
"PSYCHIATRIC FOLLOW UP:    Reason for admission:   Chest pain   Reason for consult: suicidal ideation  Requesting Physician: Josy      HPI:     Rajiv Sam is a 58 y.o. year old male with a PMH of HTN, CAD who presented to [facility] complaining of chest pain, talking about wanting to jump off a roof to kill himself.He is doing much better today, but he is still reporting suicidal ideation. He is frustrated they won't let him walk much; walk him watch and he is unstable and discuss with him he needs to use whatever adaptive equipment the team has felt is appropriate to ambulate, but he has a sitter and so he can ambulate in the room with her.       Psychiatric Examination: observed phenomenon:  Vitals: BP (!) 162/99   Pulse 88   Temp 37.1 °C (98.7 °F) (Temporal)   Resp 18   Ht 1.626 m (5' 4\")   Wt 54.9 kg (121 lb 0.5 oz)   SpO2 98%   BMI 20.78 kg/m²  Body mass index is 20.78 kg/m².  Musculoskeletal  Mild psychomotor retardation   Appearance:Grooming wnl   Thoughts Process: Logical and sequential, goal-directed   Thought Content: No a/vh, no evidence of delusions, no ideas of reference, no internal stimulation noted   Speech: loud at times. Irritable tone.   Mood:    Irritable   Affect:    Mildly labile   SI/HI:   +si   Attention/Alertness:   Awake, alert. Attention much improved   Memory:    Grossly intact.   Orientation:    Grossly intact.   Cognition:Grossly intact.   Insight: improving   Judgement:   Improving   Neurological Testing:    Medical systems reviewed:   Eyes: no blurry vision , no redness   Skin:no rash   CV:no palpitations , no cp   Lungs:no sob   Neuro: no ha , gait instability, weakness  GI:no abd pain, no n/v.   :no dysuria   Endocrine:no issues   Psych:see hpi   Musculoskeletal:   All other systems reviewed and are negative.       Soc history:    Homeless.     Lab results/tests:   Recent Results (from the past 48 hour(s))   ACCU-CHEK GLUCOSE    Collection Time: 02/17/19 12:15 PM   Result " Value Ref Range    Glucose - Accu-Ck 127 (H) 65 - 99 mg/dL   ACCU-CHEK GLUCOSE    Collection Time: 02/17/19  5:34 PM   Result Value Ref Range    Glucose - Accu-Ck 202 (H) 65 - 99 mg/dL   ACCU-CHEK GLUCOSE    Collection Time: 02/17/19  9:35 PM   Result Value Ref Range    Glucose - Accu-Ck 137 (H) 65 - 99 mg/dL   ACCU-CHEK GLUCOSE    Collection Time: 02/18/19  7:46 AM   Result Value Ref Range    Glucose - Accu-Ck 167 (H) 65 - 99 mg/dL   ACCU-CHEK GLUCOSE    Collection Time: 02/18/19  1:13 PM   Result Value Ref Range    Glucose - Accu-Ck 191 (H) 65 - 99 mg/dL   ACCU-CHEK GLUCOSE    Collection Time: 02/18/19  6:09 PM   Result Value Ref Range    Glucose - Accu-Ck 164 (H) 65 - 99 mg/dL   ACCU-CHEK GLUCOSE    Collection Time: 02/18/19  9:33 PM   Result Value Ref Range    Glucose - Accu-Ck 142 (H) 65 - 99 mg/dL     DX-HIP-COMPLETE - UNILATERAL 2+ RIGHT   Final Result      1.  No acute findings.      2.  Mild bilateral hip osteoarthritis.      DX-CHEST-LIMITED (1 VIEW)   Final Result      No acute cardiopulmonary disease.               Assessment:  Bipolar Disorder , current episode depressed              Plan:  legal hold: extended  Continue zyprexa , wellbutrin , refusinig increase   Refusing gabapentin

## 2019-02-19 NOTE — PROGRESS NOTES
"Pt would like gabapentin removed from his MAR as he will continue refusing it. Discussion with patient reveals that he \"doesn't want to set himself up to be disappointed\" by hoping the medication will work and then not have it relieve his pain. When asked how else he would know that it works unless he tries it - he stated it wouldn't work for him. When asked why he responds \"because I'm not going to take it.\"  "

## 2019-02-19 NOTE — DISCHARGE PLANNING
Received Transport Form @ 8892  Spoke to Jerrod YOU     Transport is scheduled for 02/19/19 @1700 going to [place].    Agency/Facility Name: [facility]  Spoke To: Herb   Outcome: Auth# [id number]

## 2019-02-19 NOTE — PROGRESS NOTES
Bedside report received, patient care assumed. Pt is A&Ox4, VSS, assessment complete.  C/o no pain at this time. Sitter at bedside. POC reviewed and questions answered. Bed in locked and low position, fall precautions in place. Call light in reach. Educated to call for assistance.

## 2019-02-19 NOTE — PROGRESS NOTES
Pt took shower appx one hour after having lidoderm patch placed. Lidoderm patch fell off. Called Dr. Chairez to see if we could re-order. Awaiting call back.

## 2019-02-20 NOTE — DISCHARGE PLANNING
Anticipated Discharge Disposition: Mental Health Hospital    Action: Notified by Rebecca [facility] that pt has been accepted to [facility]. Called [facility] and spoke with Shruthi in admissions and they are able to accept pt today.   Discussed with Dr. Baltazar and he is in agreement for discharge. Completed [facility] PCS form and faxed to Carlos [facility]. Transport has been set up for 5 pm via [facility]. Alerted bedside NATALIE Vela.   Completed chart copy and placed original copy of legal hold in chart copy.    Barriers to Discharge:     Plan: Pt to  [facility] via [facility] at 500 pm tonight.

## 2019-02-20 NOTE — PROGRESS NOTES
Report given to gloria at [facility]. Discussed all discharge instructions with pt. All questions addressed at this time.

## 2019-02-20 NOTE — DISCHARGE INSTRUCTIONS
Discharge Instructions    Discharged to other by medical transportation with escort. Discharged via wheelchair, hospital escort: Yes.  Special equipment needed: Not Applicable    Be sure to schedule a follow-up appointment with your primary care doctor or any specialists as instructed.     Discharge Plan:   Diet Plan: Discussed  Activity Level: Discussed  Smoking Cessation Offered: Patient Refused  Confirmed Follow up Appointment: No (Comments)  Confirmed Symptoms Management: Discussed  Medication Reconciliation Updated: Yes  Influenza Vaccine Indication: Not indicated: Previously immunized this influenza season and > 8 years of age    I understand that a diet low in cholesterol, fat, and sodium is recommended for good health. Unless I have been given specific instructions below for another diet, I accept this instruction as my diet prescription.   Other diet: diabetic      Special Instructions: None    · Is patient discharged on Warfarin / Coumadin?   No     Depression / Suicide Risk    As you are discharged from this [facility] facility, it is important to learn how to keep safe from harming yourself.    Recognize the warning signs:  · Abrupt changes in personality, positive or negative- including increase in energy   · Giving away possessions  · Change in eating patterns- significant weight changes-  positive or negative  · Change in sleeping patterns- unable to sleep or sleeping all the time   · Unwillingness or inability to communicate  · Depression  · Unusual sadness, discouragement and loneliness  · Talk of wanting to die  · Neglect of personal appearance   · Rebelliousness- reckless behavior  · Withdrawal from people/activities they love  · Confusion- inability to concentrate     If you or a loved one observes any of these behaviors or has concerns about self-harm, here's what you can do:  · Talk about it- your feelings and reasons for harming yourself  · Remove any means that you might use to hurt  yourself (examples: pills, rope, extension cords, firearm)  · Get professional help from the community (Mental Health, Substance Abuse, psychological counseling)  · Do not be alone:Call your Safe Contact- someone whom you trust who will be there for you.  · Call your local CRISIS HOTLINE 021-1753 or 489-179-1044  · Call your local Children's Mobile Crisis Response Team [place] (016) 456-7816 or www.rimidi  · Call the toll free National Suicide Prevention Hotlines   · National Suicide Prevention Lifeline 686-347-KSBE (0447)  · National Hope Line Network 800-SUICIDE (899-0865)

## 2019-02-20 NOTE — DISCHARGE SUMMARY
Discharge Summary    CHIEF COMPLAINT ON ADMISSION  Chief Complaint   Patient presents with   • Chest Pain   • Suicidal Ideation       Reason for Admission  SI/CP     CODE STATUS  Full Code    HPI & HOSPITAL COURSE  This is a 58 y.o. male with past medical history of hypertension, coronary artery disease status post PCI with stents placement, and depression who comes in with chest pain.  Was also complaining of left hip pain.  Chest pain has resolved.  No ACS suspected.  Felt to be secondary to musculoskeletal chest pain.  Also was complaining of left hip pain.  X-ray showed osteoarthritis with no acute bony abnormalities or fractures.  Patient then expressed suicidal ideation.  Psychiatry consulted.  Patient was placed on legal hold.  His Seroquel was discontinued and Zyprexa was started.  Atarax as needed for agitation.  Psychiatry recommended inpatient psychiatry hospital transfer.  Patient was hemodynamically and clinically stable.  Referral was sent and patient was accepted to psychiatric hospital.  On day of discharge, patient was clinically stable.  He was chest pain-free.  Hip pain has been stable.  He was cleared for discharge from medical standpoint.       Therefore, he is discharged in fair and stable condition to a psychiatric hospital.    The patient met 2-midnight criteria for an inpatient stay at the time of discharge.      FOLLOW UP ITEMS POST DISCHARGE  Follow-up with PCP at psychiatric hospital as per recommendations per    DISCHARGE DIAGNOSES  Active Problems:    Suicidal ideation POA: Yes    Tobacco abuse POA: Yes    CAD (coronary artery disease) POA: Yes    Hypertension POA: Yes    PTSD (post-traumatic stress disorder) POA: Yes    Type 2 diabetes mellitus with hyperglycemia, without long-term current use of insulin (HCC) POA: Yes    Left hip pain POA: No  Resolved Problems:    Chest pain POA: Yes      FOLLOW UP  No future appointments.  [facility] - [facility] ([facility])  [address]  [place] [address]  [phone]          MEDICATIONS ON DISCHARGE     Medication List      START taking these medications      Instructions   amLODIPine 10 MG Tabs  Start taking on:  2/20/2019  Commonly known as:  NORVASC   Take 1 Tab by mouth every day.  Dose:  10 mg     * hydrOXYzine pamoate 100 MG Caps  Commonly known as:  VISTARIL   Take 1 Cap by mouth every bedtime.  Dose:  100 mg     * hydrOXYzine pamoate 50 MG Caps  Commonly known as:  VISTARIL   Take 1 Cap by mouth 1 time daily as needed for Anxiety.  Dose:  50 mg     ibuprofen 200 MG Tabs  Commonly known as:  MOTRIN   Take 1 Tab by mouth every 6 hours as needed.  Dose:  200 mg     olanzapine 10 MG tablet  Commonly known as:  ZYPREXA   Take 1 Tab by mouth every evening.  Dose:  10 mg     traZODone 50 MG Tabs  Commonly known as:  DESYREL   Take 1 Tab by mouth every bedtime.  Dose:  50 mg        * This list has 2 medication(s) that are the same as other medications prescribed for you. Read the directions carefully, and ask your doctor or other care provider to review them with you.            CONTINUE taking these medications      Instructions   aspirin EC 81 MG Tbec  Commonly known as:  ECOTRIN   Take 1 Tab by mouth every day.  Dose:  81 mg     atorvastatin 80 MG tablet  Commonly known as:  LIPITOR   Take 1 Tab by mouth every evening.  Dose:  80 mg     buPROPion  MG Tb12 sustained-release tablet  Commonly known as:  WELLBUTRIN-SR   Take 300 mg by mouth every day.  Dose:  300 mg     clopidogrel 75 MG Tabs  Commonly known as:  PLAVIX   Take 1 Tab by mouth every day.  Dose:  75 mg     lisinopril 40 MG tablet  Commonly known as:  PRINIVIL, ZESTRIL   Take 40 mg by mouth every day.  Dose:  40 mg     metformin 1000 MG tablet  Commonly known as:  GLUCOPHAGE   Take 1 Tab by mouth 2 times a day, with meals.  Dose:  1000 mg     metoprolol 25 MG Tabs  Commonly known as:  LOPRESSOR   Take 1 Tab by mouth 2 times a day.  Dose:  25 mg     nitroglycerin 0.4 MG  Subl  Commonly known as:  NITROSTAT   Place 1 Tab under tongue as needed for Chest Pain.  Dose:  0.4 mg     omeprazole 20 MG delayed-release capsule  Commonly known as:  PRILOSEC   Take 1 Cap by mouth every day.  Dose:  20 mg     prazosin 2 MG Caps  Commonly known as:  MINIPRESS   Take 2 mg by mouth every evening.  Dose:  2 mg        STOP taking these medications    QUEtiapine 200 MG Tabs  Commonly known as:  SEROQUEL            Allergies  Allergies   Allergen Reactions   • Pcn [Penicillins] Rash     Rash       DIET  Orders Placed This Encounter   Procedures   • Diet Order Diabetic (No Sharps)     Paperware only on meal tray.     Standing Status:   Standing     Number of Occurrences:   1     Order Specific Question:   Diet:     Answer:   Diabetic [3]     Comments:   No Sharps       ACTIVITY  As tolerated.  Weight bearing as tolerated    LINES, DRAINS, AND WOUNDS  This is an automated list. Peripheral IVs will be removed prior to discharge.       Wound 12/28/18 Other (comment) Finger (Comment which one) (Active)                  MENTAL STATUS ON TRANSFER  Level of Consciousness: Alert  Orientation : Oriented x 4  Speech: Speech Clear    CONSULTATIONS  Psychiatry    PROCEDURES  None    LABORATORY  Lab Results   Component Value Date    SODIUM 137 02/15/2019    POTASSIUM 4.0 02/15/2019    CHLORIDE 106 02/15/2019    CO2 25 02/15/2019    GLUCOSE 196 (H) 02/15/2019    BUN 18 02/15/2019    CREATININE 1.33 02/15/2019        Lab Results   Component Value Date    WBC 6.1 02/14/2019    HEMOGLOBIN 11.7 (L) 02/14/2019    HEMATOCRIT 36.6 (L) 02/14/2019    PLATELETCT 292 02/14/2019        Total time of the discharge process exceeds 40 minutes.

## 2019-02-20 NOTE — DISCHARGE PLANNING
RN called looking for Pt belongings . SW found belongings in [facility].  SW delivered 2 bags of  Pt belongings to NATALIE Vela.

## 2019-03-08 ENCOUNTER — HOSPITAL ENCOUNTER (EMERGENCY)
Dept: HOSPITAL 8 - ED | Age: 59
Discharge: HOME | End: 2019-03-08
Payer: MEDICARE

## 2019-03-08 ENCOUNTER — HOSPITAL ENCOUNTER (EMERGENCY)
Facility: MEDICAL CENTER | Age: 59
End: 2019-03-09
Attending: EMERGENCY MEDICINE
Payer: MEDICARE

## 2019-03-08 VITALS — BODY MASS INDEX: 22.06 KG/M2 | HEIGHT: 64 IN | WEIGHT: 129.19 LBS

## 2019-03-08 VITALS — SYSTOLIC BLOOD PRESSURE: 147 MMHG | DIASTOLIC BLOOD PRESSURE: 90 MMHG

## 2019-03-08 DIAGNOSIS — Z72.9: ICD-10-CM

## 2019-03-08 DIAGNOSIS — I10: ICD-10-CM

## 2019-03-08 DIAGNOSIS — F33.1: Primary | ICD-10-CM

## 2019-03-08 DIAGNOSIS — E11.9: ICD-10-CM

## 2019-03-08 DIAGNOSIS — R45.851 SUICIDAL IDEATION: ICD-10-CM

## 2019-03-08 DIAGNOSIS — I25.2: ICD-10-CM

## 2019-03-08 LAB
ALBUMIN SERPL-MCNC: 3.5 G/DL (ref 3.4–5)
ALP SERPL-CCNC: 116 U/L (ref 45–117)
ALT SERPL-CCNC: 26 U/L (ref 12–78)
ANION GAP SERPL CALC-SCNC: 5 MMOL/L (ref 5–15)
APAP SERPL-MCNC: < 2 MCG/ML (ref 10–30)
BASOPHILS # BLD AUTO: 0.04 X10^3/UL (ref 0–0.1)
BASOPHILS NFR BLD AUTO: 0 % (ref 0–1)
BILIRUB SERPL-MCNC: 0.7 MG/DL (ref 0.2–1)
CALCIUM SERPL-MCNC: 8.2 MG/DL (ref 8.5–10.1)
CHLORIDE SERPL-SCNC: 100 MMOL/L (ref 98–107)
CREAT SERPL-MCNC: 1.18 MG/DL (ref 0.7–1.3)
EOSINOPHIL # BLD AUTO: 0.26 X10^3/UL (ref 0–0.4)
EOSINOPHIL NFR BLD AUTO: 3 % (ref 1–7)
ERYTHROCYTE [DISTWIDTH] IN BLOOD BY AUTOMATED COUNT: 18.1 % (ref 9.4–14.8)
GLUCOSE BLD-MCNC: 132 MG/DL (ref 65–99)
LYMPHOCYTES # BLD AUTO: 0.77 X10^3/UL (ref 1–3.4)
LYMPHOCYTES NFR BLD AUTO: 8 % (ref 22–44)
MCH RBC QN AUTO: 27.8 PG (ref 27.5–34.5)
MCHC RBC AUTO-ENTMCNC: 31.9 G/DL (ref 33.2–36.2)
MCV RBC AUTO: 87 FL (ref 81–97)
MD: NO
MONOCYTES # BLD AUTO: 0.93 X10^3/UL (ref 0.2–0.8)
MONOCYTES NFR BLD AUTO: 10 % (ref 2–9)
NEUTROPHILS # BLD AUTO: 7.79 X10^3/UL (ref 1.8–6.8)
NEUTROPHILS NFR BLD AUTO: 80 % (ref 42–75)
PLATELET # BLD AUTO: 374 X10^3/UL (ref 130–400)
PMV BLD AUTO: 7.2 FL (ref 7.4–10.4)
POC BREATHALIZER: 0 PERCENT (ref 0–0.01)
PROT SERPL-MCNC: 7 G/DL (ref 6.4–8.2)
RBC # BLD AUTO: 4.2 X10^6/UL (ref 4.38–5.82)
VANCOMYCIN TROUGH SERPL-MCNC: 4.9 MG/DL (ref 2.8–20)

## 2019-03-08 PROCEDURE — 93005 ELECTROCARDIOGRAM TRACING: CPT

## 2019-03-08 PROCEDURE — 99285 EMERGENCY DEPT VISIT HI MDM: CPT

## 2019-03-08 PROCEDURE — 80053 COMPREHEN METABOLIC PANEL: CPT

## 2019-03-08 PROCEDURE — 90791 PSYCH DIAGNOSTIC EVALUATION: CPT

## 2019-03-08 PROCEDURE — 302970 POC BREATHALIZER

## 2019-03-08 PROCEDURE — 85025 COMPLETE CBC W/AUTO DIFF WBC: CPT

## 2019-03-08 PROCEDURE — 36415 COLL VENOUS BLD VENIPUNCTURE: CPT

## 2019-03-08 PROCEDURE — 302970 POC BREATHALIZER: Performed by: EMERGENCY MEDICINE

## 2019-03-08 PROCEDURE — 80329 ANALGESICS NON-OPIOID 1 OR 2: CPT

## 2019-03-08 PROCEDURE — 80307 DRUG TEST PRSMV CHEM ANLYZR: CPT

## 2019-03-08 PROCEDURE — 82962 GLUCOSE BLOOD TEST: CPT

## 2019-03-08 PROCEDURE — G0480 DRUG TEST DEF 1-7 CLASSES: HCPCS

## 2019-03-08 PROCEDURE — 99284 EMERGENCY DEPT VISIT MOD MDM: CPT

## 2019-03-08 RX ORDER — LORAZEPAM 1 MG/1
1 TABLET ORAL ONCE
Status: DISCONTINUED | OUTPATIENT
Start: 2019-03-08 | End: 2019-03-09 | Stop reason: HOSPADM

## 2019-03-08 NOTE — NUR
pt presents to ED with SI, plan to jump off building. pt placed in gown, 
provided with warm blanket. placed in secure room. all belongings and clothing 
bagged, placed in locked cabinet. pt instructed to provide urine sample, urinal 
at bedside. pt verbalizes understanding. pt calm and cooperative at this time.

## 2019-03-08 NOTE — NUR
VASQUEZ Guardado at bedside. during RN's assessment, pt reports he has had chest pain 
x 5 days, this was not mentioned in triage. pain level 6/10, sternal and sharp. 
pt reports stress exacerbates pain, reports associated sign of dizziness but 
denies other associated sx. VASQUEZ Guardado aware.

## 2019-03-09 VITALS
RESPIRATION RATE: 16 BRPM | WEIGHT: 128.75 LBS | HEART RATE: 107 BPM | BODY MASS INDEX: 22.1 KG/M2 | OXYGEN SATURATION: 95 % | DIASTOLIC BLOOD PRESSURE: 99 MMHG | TEMPERATURE: 97.4 F | SYSTOLIC BLOOD PRESSURE: 143 MMHG

## 2019-03-09 LAB
AMPHET UR QL SCN: NEGATIVE
BARBITURATES UR QL SCN: NEGATIVE
BENZODIAZ UR QL SCN: NEGATIVE
BZE UR QL SCN: NEGATIVE
CANNABINOIDS UR QL SCN: POSITIVE
METHADONE UR QL SCN: NEGATIVE
OPIATES UR QL SCN: NEGATIVE
OXYCODONE UR QL SCN: NEGATIVE
PCP UR QL SCN: NEGATIVE
PROPOXYPH UR QL SCN: NEGATIVE

## 2019-03-09 NOTE — ED TRIAGE NOTES
Rajivpamela Sam  58 y.o. male  Chief Complaint   Patient presents with   • Suicidal Ideation     Pt. states hx of SI and states being suicidal for the past 5 months. Pt. has a hx of suicide attempts having thrown himself into traffic before. Pt. states he wants to jump off of the top of the [facility] today. Pt. states he was just seen at [facility] for the same complaint for 45 min. and then discharged at 6pm.     /108   Pulse 96   Temp 36.6 °C (97.8 °F) (Temporal)   Resp 16   Wt 58.4 kg (128 lb 12 oz)   SpO2 97%   BMI 22.10 kg/m²     Pt amb to triage with steady gait for above complaint. Pt's POC breathalyzer was 0.002.  Pt is alert and oriented, speaking in full sentences, follows commands and responds appropriately to questions. NAD. Resp are even and unlabored.  Charge RN notified of this pt. Pt. To await room placement in triage under observation by RN until room is available.

## 2019-03-09 NOTE — DISCHARGE INSTRUCTIONS
Please follow up with a primary physician for blood pressure management, diabetic screening, and all other preventive health concerns.

## 2019-03-09 NOTE — DISCHARGE PLANNING
Medical Social Work    Referral: Legal hold Transfer to Mental Health Facility    Intervention: MISA received call from Shruthi at [facility] stating that they have accepted the patient for admission.     Pt's accepting physcian is Dr. Balta BYNUM arranged for transportation to be set up through [facility]    The pt will be picked up at 0900.     MISA notified the RN of the departure time as well as accepting facility.     MISA created transfer packet and placed on chart.     Plan: Pt will transfer back to [facility] at 0900

## 2019-03-09 NOTE — ED NOTES
This RN assumed report. Pt resting no distress. Respirations equal and unlabored. 1:1 sitter at bedside. Continue to monitor.

## 2019-03-09 NOTE — ED NOTES
Pt transferred to [facility] via [facility]. Pt alert, given box lunch and no distress at time of transfer. Report given to [facility] RN

## 2019-03-09 NOTE — DISCHARGE PLANNING
Medical Social Work    Referral: Legal Hold    Intervention: Legal Hold Paperwork given to SW by [employer] RN Ruslan    Legal Hold Initiated: Date: 3/8/19 Time: 2135    Patient’s Insurance Listed on Face Sheet: Medicare and Medicaid FFS    [facility]: [facility], [facility], [facility], and [facility].     This referral contains the following information:  1) Face sheet __x__  2) Page 1 and Page 2 of Legal Hold __x__  3) Alert Team Assessment/Psych Assessment __x__  4) Head to toe physical exam __x__  5) Urine Drug Screen __x__  6) Blood Alcohol __x__  7) Vital signs __x__  8) Pregnancy test when applicable _na__  9) Medications list __x__    Plan: Patient will transfer to mental health facility once acceptance is obtained

## 2019-03-09 NOTE — ED NOTES
Patient stable w no ss of distress or agitation at this time. Sleeping soundly, sitter remains at bedside for 1:1 obs. No needs noted.

## 2019-03-09 NOTE — CONSULTS
[facility]   TRIAGE ASSESSMENT    Name: Rajiv Sam  MRN: [id number]  : 1960  Age: 58 y.o.  Date of assessment: 3/8/2019  PCP: Pcp Pt States None  Persons in attendance: Patient    CHIEF COMPLAINT/PRESENTING ISSUE (as stated by Rajiv Sam): The patient presents as a 58 year-old male, reporting that he is actively suicidal w/ plan to jump off the [facility] parking garage. The patient reports he previously attempted suicide approximately 14-15 years ago, noting he threw himself into traffic at the time. The patient reports he was discharged from [facility] for a similar complaint (SI) earlier in the day, noting he felt suicidal after discharge and then walked to the [facility] ER. The patient reports being previously diagnosed with Major Depressive DO and Generalized Anxiety DO; he states that he is currently connected to psychiatric services through [facility], noting he departed there approximately 1 week ago. The patient presented as drowsy but oriented, denying delusions, hallucinations, as well as active substance abuse. The patient presented as cooperative throughout the duration of the assessment.   Chief Complaint   Patient presents with   • Suicidal Ideation     Pt. states hx of SI and states being suicidal for the past 5 months. Pt. has a hx of suicide attempts having thrown himself into traffic before. Pt. states he wants to jump off of the top of the [facility] today. Pt. states he was just seen at [facility] for the same complaint for 45 min. and then discharged at 6pm.        CURRENT LIVING SITUATION/SOCIAL SUPPORT: The patient reports he is currently homeless; he states that he has been living in [place] for 6 years but has recently become homeless in the past week due to issues with his debit card/finances.     BEHAVIORAL HEALTH TREATMENT HISTORY  Does patient/parent report a history of prior behavioral health treatment for patient?   Yes:    Dates Level of Care  Facilty/Provider Diagnosis/Problem Medications   2014-Current OP/Inpatient [facility] Depression/Anxiety Unknown   10/5/2018 Inpatient [facility] Depression/Anxiety Unknown         SAFETY ASSESSMENT - SELF  Does patient acknowledge current or past symptoms of dangerousness to self? yes  Does parent/significant other report patient has current or past symptoms of dangerousness to self? N\A  Does presenting problem suggest symptoms of dangerousness to self? Yes:     Past Current    Suicidal Thoughts: [x]  [x]    Suicidal Plans: [x]  [x]    Suicidal Intent: [x]  [x]    Suicide Attempts: [x]  []    Self-Injury []  []      For any boxes checked above, provide detail: The patient reports a history of suicidal thoughts and one previous attempt, noting he is currently suicidal with plans to jump off of a building to end his life.    History of suicide by family member: no  History of suicide by friend/significant other: no  Recent change in frequency/specificity/intensity of suicidal thoughts or self-harm behavior? yes - Last 24 hours.  Current access to firearms, medications, or other identified means of suicide/self-harm? yes - Patient reports he is able to ambulate [place] in order to attempt suicide.  If yes, willing to restrict access to means of suicide/self-harm? no  Protective factors present:  Willing to address in treatment    SAFETY ASSESSMENT - OTHERS  Does patient acknowledge current or past symptoms of aggressive behavior or risk to others? no  Does parent/significant other report patient has current or past symptoms of aggressive behavior or risk to others?  N\A  Does presenting problem suggest symptoms of dangerousness to others? No    Crisis Safety Plan completed and copy given to patient? No; patient unable to contract for safety at this time.     ABUSE/NEGLECT SCREENING  Does patient report feeling “unsafe” in his/her home, or afraid of anyone?  no  Does patient report  "any history of physical, sexual, or emotional abuse?  no  Does parent or significant other report any of the above? N\A  Is there evidence of neglect by self?  no  Is there evidence of neglect by a caregiver? no  Does the patient/parent report any history of CPS/APS/police involvement related to suspected abuse/neglect or domestic violence? no  Based on the information provided during the current assessment, is a mandated report of suspected abuse/neglect being made?  No    SUBSTANCE USE SCREENING  Yes:  Be all substances used in the past 30 days:      Last Use Amount   []   Alcohol     []   Marijuana     []   Heroin     []   Prescription Opioids  (used without prescription, for    recreation, or in excess of prescribed amount)     []   Other Prescription  (used without prescription, for    recreation, or in excess of prescribed amount)     []   Cocaine      []   Methamphetamine     []   \"\" drugs (ectasy, MDMA)     []   Other substances        UDS results: Pending  Breathalyzer results: 0.002    What consequences does the patient associate with any of the above substance use and or addictive behaviors? None    Risk factors for detox (check all that apply):  []  Seizures   []  Diaphoretic (sweating)   []  Tremors   []  Hallucinations   []  Increased blood pressure   []  Decreased blood pressure   []  Other   []  None      [] Patient education on risk factors for detoxification and instructed to return to ER as needed.      MENTAL STATUS   Participation: Active verbal participation  Grooming: Adequate  Orientation: Fully Oriented and Drowsy/Somnolent  Behavior: Calm  Eye contact: Good  Mood: Depressed  Affect: Full range, Congruent with content and Sad  Thought process: Logical and Goal-directed  Thought content: Within normal limits  Speech: Rate within normal limits and Soft  Perception: Within normal limits  Memory:  No gross evidence of memory deficits  Insight: Limited  Judgment:  " Limited  Other:    Collateral information:   Source:  [] Significant other present in person:   [] Significant other by telephone  [] [facility]   [x] [facility] Staff  [x] [facility]  [] Other:      CLINICAL IMPRESSIONS:  Primary:  Major Depressive DO  Secondary:  Generalized Anxiety DO       IDENTIFIED NEEDS/PLAN:  [Trigger DISPOSITION list for any items marked]    [x]  Imminent safety risk - self [] Imminent safety risk - others   []  Acute substance withdrawal []  Psychosis/Impaired reality testing   [x]  Mood/anxiety []  Substance use/Addictive behavior   [x]  Maladaptive behaviro []  Parent/child conflict   []  Family/Couples conflict []  Biomedical   [x]  Housing [x]  Financial   []   Legal  Occupational/Educational   []  Domestic violence []  Other:     Disposition: Consulted with Dr. Chakraborty. Patient actively endorsing SI w/plan at this time, patient has been placed on legal hold accordingly, pending transfer to an inpatient facility at this time.     Does patient express agreement with the above plan? yes    Referral appointment(s) scheduled? no    Alert team only:   I have discussed findings and recommendations with Dr. Chakraborty who is in agreement with these recommendations.     Referral information sent to the following community providers :    If applicable : Referred  to : [place] for legal hold follow up at (time): 10:28PM      TYESHA Singh  3/8/2019

## 2019-03-09 NOTE — ED PROVIDER NOTES
"ER Provider Note     Scribed for Chi Chakraborty, * by Tracy Thomas. 3/8/2019, 9:41 PM.    Primary Care Provider: None  Means of Arrival: EMS   History obtained from: Patient  History limited by: none     CHIEF COMPLAINT   Chief Complaint   Patient presents with   • Suicidal Ideation     Pt. states hx of SI and states being suicidal for the past 5 months. Pt. has a hx of suicide attempts having thrown himself into traffic before. Pt. states he wants to jump off of the top of the [facility] today. Pt. states he was just seen at [facility] for the same complaint for 45 min. and then discharged at 6pm.       HPI   Rajiv Sam is a 58 y.o. who presents to the emergency department for suicidal ideation onset the last five months, but worsening the last few weeks. He admits to plans, stating that he wants to jump off the top of [facility]. He went there this afternoon to do this, but security called EMS. He was seen for the same at [facility] this morning, but was discharged after \"45 minutes\". He has attempted suicide before about fifteen years ago where he jumped into traffic. History of depression and has been seen at [place]. History of diabetes, hypertension, and heart disease. He has had several heart attacks in the last two years. Patient controls these with medication. Patient has been living in a motel, but had trouble with his debit card, so was kicked out. He has been homeless the last week. He has not been so in six years.    REVIEW OF SYSTEMS   General: Positive for homelessness; No fever or chills.  Eyes: No eye discharge. No eye pain.  Ear nose throat: No sore throat or  trouble swallowing. No runny nose or congestion.  Pulmonary: No shortness of breath or cough.  Cardiovascular: No chest pain or chest pressure.  GI: No abdominal pain nausea or vomiting.  : No dysuria or hematuria  Dermatologic: No rashes. No abrasions.  Neurologic: No weakness or numbness.  Psychiatric: positive for " suicidal ideation  All other systems reviewed and are negative    PAST MEDICAL HISTORY  Past Medical History:   Diagnosis Date   • Hypertension    • Old MI (myocardial infarction)    • Psychiatric disorder     depression    • PTSD (post-traumatic stress disorder)        SURGICAL HISTORY  Past Surgical History:   Procedure Laterality Date   • CARDIAC CATH, LEFT HEART         SOCIAL HISTORY  Social History   Substance Use Topics   • Smoking status: Current Every Day Smoker   • Smokeless tobacco: Never Used   • Alcohol use No       CURRENT MEDICATIONS  Previous Medications    AMLODIPINE (NORVASC) 10 MG TAB    Take 1 Tab by mouth every day.    ASPIRIN EC (ECOTRIN) 81 MG TABLET DELAYED RESPONSE    Take 1 Tab by mouth every day.    ATORVASTATIN (LIPITOR) 80 MG TABLET    Take 1 Tab by mouth every evening.    BUPROPION SR (WELLBUTRIN-SR) 150 MG TABLET SR 12 HR SUSTAINED-RELEASE TABLET    Take 300 mg by mouth every day.    CLOPIDOGREL (PLAVIX) 75 MG TAB    Take 1 Tab by mouth every day.    HYDROXYZINE PAMOATE (VISTARIL) 100 MG CAP    Take 1 Cap by mouth every bedtime.    HYDROXYZINE PAMOATE (VISTARIL) 50 MG CAP    Take 1 Cap by mouth 1 time daily as needed for Anxiety.    IBUPROFEN (MOTRIN) 200 MG TAB    Take 1 Tab by mouth every 6 hours as needed.    LISINOPRIL (PRINIVIL, ZESTRIL) 40 MG TABLET    Take 40 mg by mouth every day.    METFORMIN (GLUCOPHAGE) 1000 MG TABLET    Take 1 Tab by mouth 2 times a day, with meals.    METOPROLOL (LOPRESSOR) 25 MG TAB    Take 1 Tab by mouth 2 times a day.    NITROGLYCERIN (NITROSTAT) 0.4 MG SL TAB    Place 1 Tab under tongue as needed for Chest Pain.    OLANZAPINE (ZYPREXA) 10 MG TABLET    Take 1 Tab by mouth every evening.    OMEPRAZOLE (PRILOSEC) 20 MG DELAYED-RELEASE CAPSULE    Take 1 Cap by mouth every day.    PRAZOSIN (MINIPRESS) 2 MG CAP    Take 2 mg by mouth every evening.    TRAZODONE (DESYREL) 50 MG TAB    Take 1 Tab by mouth every bedtime.       ALLERGIES     Allergies   Allergen  Reactions   • Pcn [Penicillins] Rash     Rash       PHYSICAL EXAM   Vital Signs: /108   Pulse 96   Temp 36.6 °C (97.8 °F) (Temporal)   Resp 16   Wt 58.4 kg (128 lb 12 oz)   SpO2 97%   BMI 22.10 kg/m²     Constitutional: Well developed, Well nourished, No acute distress, Non-toxic appearance. Slightly disheveled, appears older than stated age   Psychiatric: anxious appearing at times, fidgeting, slightly pressured speech  HENT:  Oropharynx: no exudate no erythema  Eyes: PERRLA, EOMI, Conjunctiva normal, No discharge.   Musculoskeletal: Neck is soft and supple no meningismus  Lymphatic: No cervical lymphadenopathy noted.   Cardiovascular: Normal heart rate, Normal rhythm, No murmurs, Negative Homans, no pedal edema, good equal pedal pulses.  Pulmonary: Lungs are clear to auscultation bilaterally. No wheezes rales or rhonchi  Abdomen: Bowel sounds normal, soft nondistended and nontender. No rebound and no guarding .  Skin: Warm, Dry, No erythema, No rash.   : No CVA tenderness.   Neurologic: Alert & oriented, moves all extremities normally. Good sensation to light touch on all extremities.    DIAGNOSTIC STUDIES/PROCEDURES  Labs:   Results for orders placed or performed during the hospital encounter of 03/08/19   POC BREATHALIZER   Result Value Ref Range    POC Breathalizer 0.002 0.00 - 0.01 Percent   ACCU-CHEK GLUCOSE   Result Value Ref Range    Glucose - Accu-Ck 132 (H) 65 - 99 mg/dL   All labs reviewed by me.    COURSE & MEDICAL DECISION MAKING   Nursing notes, VS, PMSFSHx reviewed in chart     Differential Diagnoses: (include but are not limited to) anxiety, diabetes, SI.    9:41 PM - Patient was evaluated; urine drug screen, breathalyzer ordered. The patient will be medicated with 1mg ativan for his symptoms.     10:00 PM Spoke to [facility] about the patient's case.    11:07 PM [facility] has seen patient. Spoke to them about status.     The patient will return for new or worsening symptoms and is  stable at the time of transfer.    The patient is referred to a primary physician for blood pressure management, diabetic screening, and for all other preventative health concerns.    Decision Making:  This is a 58 y.o. male who presents with suicidal ideation.  It appears that the patient has significant life stressors he is currently homeless for the first time in 7 years does not have financial means.  His debit card is in the mail and has been returned by his old residence.  He currently has been out of his medications.  He does have PTSD.  Patient has no good support system.  Because of all these findings, he will be evaluated for psychiatric needed.  It was felt by the life skills team and myself the patient will require psychiatric transfer.  I find no acute medical issues at this time glucose is stable.      DISPOSITION:  Patient will be admitted to [facility] in guarded condition.    FINAL IMPRESSION   1. Suicidal ideation        Tracy ELDRIDGE (Yue), am scribing for, and in the presence of, Chi Chakraborty, *.    Electronically signed by: Tracy Thomas (Yue), 3/8/2019    IChi, * personally performed the services described in this documentation, as scribed by Tracy Thomas in my presence, and it is both accurate and complete.    The note accurately reflects work and decisions made by me.  Chi Chakraborty  3/9/2019  12:09 AM    C

## 2019-10-18 ENCOUNTER — HOSPITAL ENCOUNTER (EMERGENCY)
Facility: MEDICAL CENTER | Age: 59
End: 2019-10-19
Attending: EMERGENCY MEDICINE
Payer: MEDICARE

## 2019-10-18 DIAGNOSIS — R45.851 SUICIDAL IDEATION: ICD-10-CM

## 2019-10-18 LAB
POC BREATHALIZER: 0 PERCENT (ref 0–0.01)
POC BREATHALIZER: 0 PERCENT (ref 0–0.01)

## 2019-10-18 PROCEDURE — 302970 POC BREATHALIZER

## 2019-10-18 PROCEDURE — 302970 POC BREATHALIZER: Performed by: EMERGENCY MEDICINE

## 2019-10-18 PROCEDURE — 99285 EMERGENCY DEPT VISIT HI MDM: CPT

## 2019-10-19 VITALS
DIASTOLIC BLOOD PRESSURE: 109 MMHG | WEIGHT: 130 LBS | TEMPERATURE: 98.2 F | SYSTOLIC BLOOD PRESSURE: 153 MMHG | RESPIRATION RATE: 16 BRPM | HEIGHT: 64 IN | OXYGEN SATURATION: 96 % | BODY MASS INDEX: 22.2 KG/M2 | HEART RATE: 66 BPM

## 2019-10-19 PROCEDURE — 80307 DRUG TEST PRSMV CHEM ANLYZR: CPT

## 2019-10-19 RX ORDER — QUETIAPINE FUMARATE 200 MG/1
200 TABLET, FILM COATED ORAL
Status: ON HOLD | COMMUNITY
End: 2020-01-09

## 2019-10-19 RX ORDER — QUETIAPINE FUMARATE 100 MG/1
100 TABLET, FILM COATED ORAL 2 TIMES DAILY
Status: ON HOLD | COMMUNITY
End: 2020-01-09

## 2019-10-19 RX ORDER — BUPROPION HYDROCHLORIDE 300 MG/1
300 TABLET ORAL EVERY MORNING
Status: ON HOLD | COMMUNITY
End: 2020-01-09

## 2019-10-19 RX ORDER — HYDROXYZINE PAMOATE 100 MG
100 CAPSULE ORAL 2 TIMES DAILY PRN
Status: ON HOLD | COMMUNITY
End: 2020-01-09

## 2019-10-19 NOTE — ED PROVIDER NOTES
ED Provider Note    ER PROVIDER NOTE        CHIEF COMPLAINT  Chief Complaint   Patient presents with   • Suicidal Ideation     BIB EMS from the [facility], per EMS pt endorsed SI [place] stating he was going to jump off the parking structure. per EMS pt endorses being out of seroquel and wellbutrin.        HPI  Rajiv Sam is a 58 y.o. male who presents to the emergency department complaining of suicidal ideation.  Patient reports he has been unable to get his medications over the last 3 weeks due to financial reasons, today he came to the [facility] casino went to jump off the parking structure and security stopped him.  He has been hospitalized multiple times in the past for mental health.  He denies any ingestions or attempt right now.  He does have other medical problems, hypertension, CAD, but has had no recent somatic symptoms.  No chest pain, shortness of breath, abdominal pain, nausea, vomiting, fevers chills or any other complaints    REVIEW OF SYSTEMS  Pertinent positives include suicidal ideation. Pertinent negatives include no fever or headache. See HPI for details. All other systems reviewed and are negative.    PAST MEDICAL HISTORY   has a past medical history of Hypertension, Old MI (myocardial infarction), Psychiatric disorder, and PTSD (post-traumatic stress disorder).    SURGICAL HISTORY   has a past surgical history that includes cardiac cath, left heart.    FAMILY HISTORY  Family History   Problem Relation Age of Onset   • Cancer Mother        SOCIAL HISTORY  Social History     Socioeconomic History   • Marital status: Single     Spouse name: Not on file   • Number of children: Not on file   • Years of education: Not on file   • Highest education level: Not on file   Occupational History   • Not on file   Social Needs   • Financial resource strain: Not on file   • Food insecurity:     Worry: Not on file     Inability: Not on file   • Transportation needs:     Medical: Not on file      Non-medical: Not on file   Tobacco Use   • Smoking status: Current Every Day Smoker   • Smokeless tobacco: Never Used   Substance and Sexual Activity   • Alcohol use: No   • Drug use: Yes     Types: Inhaled     Comment: pot   • Sexual activity: Not on file   Lifestyle   • Physical activity:     Days per week: Not on file     Minutes per session: Not on file   • Stress: Not on file   Relationships   • Social connections:     Talks on phone: Not on file     Gets together: Not on file     Attends Restorationist service: Not on file     Active member of club or organization: Not on file     Attends meetings of clubs or organizations: Not on file     Relationship status: Not on file   • Intimate partner violence:     Fear of current or ex partner: Not on file     Emotionally abused: Not on file     Physically abused: Not on file     Forced sexual activity: Not on file   Other Topics Concern   • Not on file   Social History Narrative   • Not on file      Social History     Substance and Sexual Activity   Drug Use Yes   • Types: Inhaled    Comment: pot       CURRENT MEDICATIONS  Home Medications     Reviewed by Bel Joe (Pharmacy Tech) on 10/19/19 at 0004  Med List Status: Complete   Medication Last Dose Status   amLODIPine (NORVASC) 10 MG Tab 4 weeks Active   aspirin EC (ECOTRIN) 81 MG Tablet Delayed Response 4 weeks Active   atorvastatin (LIPITOR) 80 MG tablet 4 weeks Active   B Complex Vitamins (VITAMIN B COMPLEX PO) 4 weeks Active   buPROPion (WELLBUTRIN XL) 300 MG XL tablet 4 weeks Active   clopidogrel (PLAVIX) 75 MG Tab 4 weeks Active   hydrOXYzine pamoate (VISTARIL) 100 MG Cap 4 weeks Active   lisinopril (PRINIVIL, ZESTRIL) 40 MG tablet 4 weeks Active   metformin (GLUCOPHAGE) 1000 MG tablet 4 weeks Active   metoprolol (LOPRESSOR) 25 MG Tab 4 weeks Active   nitroglycerin (NITROSTAT) 0.4 MG SL Tab 4 weeks Active   omeprazole (PRILOSEC) 20 MG delayed-release capsule 4 weeks Active   prazosin (MINIPRESS) 2 MG  "Cap 4 weeks Active   QUEtiapine (SEROQUEL) 100 MG Tab 4 weeks Active   QUEtiapine (SEROQUEL) 200 MG Tab 4 weeks Active                ALLERGIES  Allergies   Allergen Reactions   • Pcn [Penicillins] Rash     Rash       PHYSICAL EXAM  VITAL SIGNS: /88   Pulse 85   Temp 36.7 °C (98 °F) (Temporal)   Resp 17   Ht 1.626 m (5' 4\")   Wt 59 kg (130 lb)   SpO2 96%   BMI 22.31 kg/m²   Pulse ox interpretation: I interpret this pulse ox as normal.    Constitutional: Alert in no apparent distress.  HENT: No signs of trauma, Bilateral external ears normal, Nose normal.   Eyes: Pupils are equal and reactive, Conjunctiva normal, Non-icteric.   Neck: Normal range of motion, No tenderness, Supple, No stridor.   Lymphatic: No lymphadenopathy noted.   Cardiovascular: Regular rate and rhythm, no murmurs.   Thorax & Lungs: Normal breath sounds, No respiratory distress, No wheezing, No chest tenderness.   Abdomen: Bowel sounds normal, Soft, No tenderness, No masses, No pulsatile masses. No peritoneal signs.  Skin: Warm, Dry, No erythema, No rash.   Back: No bony tenderness, No CVA tenderness.   Extremities: Intact distal pulses, No edema, No tenderness, No cyanosis, Negative Romi's sign.  Musculoskeletal: Good range of motion in all major joints. No tenderness to palpation or major deformities noted.   Neurologic: Alert , Normal motor function, Normal sensory function, No focal deficits noted.   Psychiatric: Affect normal, Judgment normal, Mood normal.     DIAGNOSTIC STUDIES / PROCEDURES    Labs Reviewed   URINE DRUG SCREEN - Abnormal; Notable for the following components:       Result Value    Cannabinoid Metab Positive (*)     All other components within normal limits   POC BREATHALIZER - Normal   POC BREATHALIZER - Normal         RADIOLOGY  No orders to display     The radiologist's interpretation of all radiological studies have been reviewed by me.    COURSE & MEDICAL DECISION MAKING  Nursing notes, VS, PMSFHx reviewed " in chart.    10:01 PM Patient seen and examined at bedside.  Will have life skills evaluation, breathalyzer, urine drug screen    Patient reevaluated, comfortable at this time, continue legal hold    Decision Making:  This is a 58 y.o. male presenting with suicidal ideation.  Patient does have long-standing psychiatric history, and has been off his medications over the last few weeks, does currently appear decompensated.  He states the plan, is certainly not willing to contract for safety, and has essentially no social support at this time so I do feel he is high risk and legal hold be continued.  We will start him back on his home medications for both his chronic medical conditions as well as psychiatric medications when we are able to ascertain what he is on through the pharmacy.  Patient has been medically cleared    Patient's care will be transferred to the day emergency physician pending inpatient psychiatric evaluation    FINAL IMPRESSION  1. Suicidal ideation         The note accurately reflects work and decisions made by me.  Darren Partida  10/19/2019  1:24 AM

## 2019-10-19 NOTE — ED NOTES
Pt sleeping calmly in room, respirations even and unlabored, under close observation of designee.

## 2019-10-19 NOTE — ED NOTES
Pt resting quietly with eyes closed, easily rouses to voice. VS reassessed. Pt comfort level checked, denies needs. Pt calm, cooperative. Sitter remains outside of room for 1:1  observation.
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
## 2019-10-19 NOTE — ED NOTES
Pt continues to sleep at this time. Visible chest rise and fall. RR 15. Sitter remains outside of room for 1:1 observation.

## 2019-10-19 NOTE — DISCHARGE PLANNING
Medical Social Work    Referral: Legal hold Transfer to Mental Health Facility    Intervention: SW received call from Paul at [facility] stating that they have accepted the patient for admission.     Pt's accepting physcian is Dr. Jake BYNUM arranged for transportation to be set up through [facility]    The pt will be picked up at 0915.     MISA notified the RN of the departure time as well as accepting facility.     MISA created transfer packet and placed on chart.     Plan: Pt will transfer back to [facility] at 0915

## 2019-10-19 NOTE — ED NOTES
Pt sleeping supine, intermittent movement noted. RR 15. Sitter remains outside of room for 1:1 observation.

## 2019-10-19 NOTE — ED TRIAGE NOTES
Chief Complaint   Patient presents with   • Suicidal Ideation     BIB EMS from the [facility], per EMS [facility] stating he was going to jump off the parking structure. per EMS pt endorses being out of seroquel and wellbutrin.

## 2019-10-19 NOTE — ED NOTES
Pt resting quietly with eyes closed, full even resp noted. Sitter remains outside of room for observation.

## 2019-10-19 NOTE — ED NOTES
Report to EMS, pt aware of POC. Appropriate paperwork given to EMS, pt belongings with EMS, pt confirmed correct.

## 2019-10-19 NOTE — DISCHARGE PLANNING
Medical Social Work    Referral: Legal Hold    Intervention: Legal Hold Paperwork given to SW by [facility] RN Be    Legal Hold Initiated: Date: 10/18/19 Time: 2230    Patient’s Insurance Listed on Face Sheet: Medicare and Medicaid FFS    Referrals sent to: [facility], [facility], [facility], and [facility].     This referral contains the following information:  1) Face sheet __x__  2) Page 1 and Page 2 of Legal Hold __x__  3) Alert Team Assessment/Psych Assessment __x__  4) Head to toe physical exam __x__  5) Urine Drug Screen ___x_  6) Blood Alcohol ___x_  7) Vital signs __x__  8) Pregnancy test when applicable _na__  9) Medications list __x__    Plan: Patient will transfer to mental health facility once acceptance is obtained

## 2019-10-19 NOTE — ED NOTES
Report from Sawyer ESTRADA. Pt ambulatory to BR with ED tech, urine sample obtained, sent to lab. Sitter outside of room for 1:1 observation.

## 2019-10-19 NOTE — ED NOTES
Medication reconciliation updated and complete per pt at bedside  Allergies have been verified and updated   No oral ABX within the last 14 days

## 2020-01-07 ENCOUNTER — APPOINTMENT (OUTPATIENT)
Dept: RADIOLOGY | Facility: MEDICAL CENTER | Age: 60
End: 2020-01-07
Attending: EMERGENCY MEDICINE
Payer: MEDICARE

## 2020-01-07 ENCOUNTER — HOSPITAL ENCOUNTER (OUTPATIENT)
Facility: MEDICAL CENTER | Age: 60
End: 2020-01-09
Attending: EMERGENCY MEDICINE | Admitting: HOSPITALIST
Payer: MEDICARE

## 2020-01-07 DIAGNOSIS — R45.851 SUICIDAL IDEATION: ICD-10-CM

## 2020-01-07 DIAGNOSIS — E87.1 HYPONATREMIA: ICD-10-CM

## 2020-01-07 DIAGNOSIS — R07.9 ACUTE CHEST PAIN: ICD-10-CM

## 2020-01-07 PROBLEM — R07.89 OTHER CHEST PAIN: Status: ACTIVE | Noted: 2018-11-03

## 2020-01-07 PROBLEM — R07.81 PLEURITIC CHEST PAIN: Status: ACTIVE | Noted: 2018-11-03

## 2020-01-07 LAB
ALBUMIN SERPL BCP-MCNC: 3.8 G/DL (ref 3.2–4.9)
ALBUMIN/GLOB SERPL: 1.7 G/DL
ALP SERPL-CCNC: 74 U/L (ref 30–99)
ALT SERPL-CCNC: 13 U/L (ref 2–50)
AMPHET UR QL SCN: NEGATIVE
ANION GAP SERPL CALC-SCNC: 7 MMOL/L (ref 0–11.9)
AST SERPL-CCNC: 17 U/L (ref 12–45)
BARBITURATES UR QL SCN: NEGATIVE
BASOPHILS # BLD AUTO: 0.8 % (ref 0–1.8)
BASOPHILS # BLD: 0.07 K/UL (ref 0–0.12)
BENZODIAZ UR QL SCN: NEGATIVE
BILIRUB SERPL-MCNC: 0.6 MG/DL (ref 0.1–1.5)
BUN SERPL-MCNC: 10 MG/DL (ref 8–22)
BZE UR QL SCN: NEGATIVE
CALCIUM SERPL-MCNC: 8.8 MG/DL (ref 8.5–10.5)
CANNABINOIDS UR QL SCN: POSITIVE
CHLORIDE SERPL-SCNC: 101 MMOL/L (ref 96–112)
CO2 SERPL-SCNC: 23 MMOL/L (ref 20–33)
CREAT SERPL-MCNC: 0.9 MG/DL (ref 0.5–1.4)
EKG IMPRESSION: NORMAL
EOSINOPHIL # BLD AUTO: 0.03 K/UL (ref 0–0.51)
EOSINOPHIL NFR BLD: 0.3 % (ref 0–6.9)
ERYTHROCYTE [DISTWIDTH] IN BLOOD BY AUTOMATED COUNT: 48.4 FL (ref 35.9–50)
GLOBULIN SER CALC-MCNC: 2.3 G/DL (ref 1.9–3.5)
GLUCOSE BLD-MCNC: 153 MG/DL (ref 65–99)
GLUCOSE SERPL-MCNC: 178 MG/DL (ref 65–99)
HCT VFR BLD AUTO: 40.7 % (ref 42–52)
HGB BLD-MCNC: 13.7 G/DL (ref 14–18)
IMM GRANULOCYTES # BLD AUTO: 0.09 K/UL (ref 0–0.11)
IMM GRANULOCYTES NFR BLD AUTO: 1 % (ref 0–0.9)
LYMPHOCYTES # BLD AUTO: 1.01 K/UL (ref 1–4.8)
LYMPHOCYTES NFR BLD: 11.6 % (ref 22–41)
MCH RBC QN AUTO: 31.4 PG (ref 27–33)
MCHC RBC AUTO-ENTMCNC: 33.7 G/DL (ref 33.7–35.3)
MCV RBC AUTO: 93.3 FL (ref 81.4–97.8)
METHADONE UR QL SCN: NEGATIVE
MONOCYTES # BLD AUTO: 0.67 K/UL (ref 0–0.85)
MONOCYTES NFR BLD AUTO: 7.7 % (ref 0–13.4)
NEUTROPHILS # BLD AUTO: 6.83 K/UL (ref 1.82–7.42)
NEUTROPHILS NFR BLD: 78.6 % (ref 44–72)
NRBC # BLD AUTO: 0 K/UL
NRBC BLD-RTO: 0 /100 WBC
OPIATES UR QL SCN: NEGATIVE
OXYCODONE UR QL SCN: NEGATIVE
PCP UR QL SCN: NEGATIVE
PLATELET # BLD AUTO: 369 K/UL (ref 164–446)
PMV BLD AUTO: 8.8 FL (ref 9–12.9)
POTASSIUM SERPL-SCNC: 3.8 MMOL/L (ref 3.6–5.5)
PROPOXYPH UR QL SCN: NEGATIVE
PROT SERPL-MCNC: 6.1 G/DL (ref 6–8.2)
RBC # BLD AUTO: 4.36 M/UL (ref 4.7–6.1)
SODIUM SERPL-SCNC: 131 MMOL/L (ref 135–145)
TROPONIN T SERPL-MCNC: 7 NG/L (ref 6–19)
TROPONIN T SERPL-MCNC: <6 NG/L (ref 6–19)
TROPONIN T SERPL-MCNC: <6 NG/L (ref 6–19)
WBC # BLD AUTO: 8.7 K/UL (ref 4.8–10.8)

## 2020-01-07 PROCEDURE — 96372 THER/PROPH/DIAG INJ SC/IM: CPT

## 2020-01-07 PROCEDURE — 93005 ELECTROCARDIOGRAM TRACING: CPT | Performed by: HOSPITALIST

## 2020-01-07 PROCEDURE — 85025 COMPLETE CBC W/AUTO DIFF WBC: CPT

## 2020-01-07 PROCEDURE — 84484 ASSAY OF TROPONIN QUANT: CPT

## 2020-01-07 PROCEDURE — A9270 NON-COVERED ITEM OR SERVICE: HCPCS | Performed by: HOSPITALIST

## 2020-01-07 PROCEDURE — 82962 GLUCOSE BLOOD TEST: CPT

## 2020-01-07 PROCEDURE — 700102 HCHG RX REV CODE 250 W/ 637 OVERRIDE(OP): Performed by: HOSPITALIST

## 2020-01-07 PROCEDURE — 99220 PR INITIAL OBSERVATION CARE,LEVL III: CPT | Performed by: HOSPITALIST

## 2020-01-07 PROCEDURE — 71045 X-RAY EXAM CHEST 1 VIEW: CPT

## 2020-01-07 PROCEDURE — G0378 HOSPITAL OBSERVATION PER HR: HCPCS

## 2020-01-07 PROCEDURE — 83036 HEMOGLOBIN GLYCOSYLATED A1C: CPT

## 2020-01-07 PROCEDURE — 93005 ELECTROCARDIOGRAM TRACING: CPT | Performed by: EMERGENCY MEDICINE

## 2020-01-07 PROCEDURE — 80053 COMPREHEN METABOLIC PANEL: CPT

## 2020-01-07 PROCEDURE — 99285 EMERGENCY DEPT VISIT HI MDM: CPT

## 2020-01-07 PROCEDURE — 80307 DRUG TEST PRSMV CHEM ANLYZR: CPT

## 2020-01-07 PROCEDURE — 36415 COLL VENOUS BLD VENIPUNCTURE: CPT

## 2020-01-07 PROCEDURE — 93005 ELECTROCARDIOGRAM TRACING: CPT

## 2020-01-07 RX ORDER — HYDROMORPHONE HYDROCHLORIDE 1 MG/ML
0.25 INJECTION, SOLUTION INTRAMUSCULAR; INTRAVENOUS; SUBCUTANEOUS
Status: DISCONTINUED | OUTPATIENT
Start: 2020-01-07 | End: 2020-01-09 | Stop reason: HOSPADM

## 2020-01-07 RX ORDER — PROMETHAZINE HYDROCHLORIDE 25 MG/1
12.5-25 SUPPOSITORY RECTAL EVERY 4 HOURS PRN
Status: DISCONTINUED | OUTPATIENT
Start: 2020-01-07 | End: 2020-01-09 | Stop reason: HOSPADM

## 2020-01-07 RX ORDER — B-COMPLEX WITH VITAMIN C
1 TABLET ORAL DAILY
COMMUNITY

## 2020-01-07 RX ORDER — PROMETHAZINE HYDROCHLORIDE 25 MG/1
12.5-25 TABLET ORAL EVERY 4 HOURS PRN
Status: DISCONTINUED | OUTPATIENT
Start: 2020-01-07 | End: 2020-01-09 | Stop reason: HOSPADM

## 2020-01-07 RX ORDER — ONDANSETRON 4 MG/1
4 TABLET, ORALLY DISINTEGRATING ORAL EVERY 4 HOURS PRN
Status: DISCONTINUED | OUTPATIENT
Start: 2020-01-07 | End: 2020-01-09 | Stop reason: HOSPADM

## 2020-01-07 RX ORDER — AMLODIPINE BESYLATE 10 MG/1
10 TABLET ORAL DAILY
Status: DISCONTINUED | OUTPATIENT
Start: 2020-01-07 | End: 2020-01-09 | Stop reason: HOSPADM

## 2020-01-07 RX ORDER — OMEPRAZOLE 20 MG/1
20 CAPSULE, DELAYED RELEASE ORAL DAILY
COMMUNITY

## 2020-01-07 RX ORDER — AMOXICILLIN 250 MG
2 CAPSULE ORAL 2 TIMES DAILY
Status: DISCONTINUED | OUTPATIENT
Start: 2020-01-07 | End: 2020-01-09 | Stop reason: HOSPADM

## 2020-01-07 RX ORDER — CLOPIDOGREL BISULFATE 75 MG/1
75 TABLET ORAL DAILY
COMMUNITY

## 2020-01-07 RX ORDER — OXYCODONE HYDROCHLORIDE 5 MG/1
2.5 TABLET ORAL
Status: DISCONTINUED | OUTPATIENT
Start: 2020-01-07 | End: 2020-01-09 | Stop reason: HOSPADM

## 2020-01-07 RX ORDER — PROCHLORPERAZINE EDISYLATE 5 MG/ML
5-10 INJECTION INTRAMUSCULAR; INTRAVENOUS EVERY 4 HOURS PRN
Status: DISCONTINUED | OUTPATIENT
Start: 2020-01-07 | End: 2020-01-09 | Stop reason: HOSPADM

## 2020-01-07 RX ORDER — PRAZOSIN HYDROCHLORIDE 2 MG/1
2 CAPSULE ORAL NIGHTLY
Status: DISCONTINUED | OUTPATIENT
Start: 2020-01-07 | End: 2020-01-09 | Stop reason: HOSPADM

## 2020-01-07 RX ORDER — CLOPIDOGREL BISULFATE 75 MG/1
75 TABLET ORAL DAILY
Status: DISCONTINUED | OUTPATIENT
Start: 2020-01-07 | End: 2020-01-09 | Stop reason: HOSPADM

## 2020-01-07 RX ORDER — ACETAMINOPHEN 325 MG/1
650 TABLET ORAL EVERY 6 HOURS PRN
Status: DISCONTINUED | OUTPATIENT
Start: 2020-01-07 | End: 2020-01-09 | Stop reason: HOSPADM

## 2020-01-07 RX ORDER — B-COMPLEX WITH VITAMIN C
1 TABLET ORAL DAILY
Status: DISCONTINUED | OUTPATIENT
Start: 2020-01-07 | End: 2020-01-07

## 2020-01-07 RX ORDER — LISINOPRIL 20 MG/1
40 TABLET ORAL DAILY
Status: DISCONTINUED | OUTPATIENT
Start: 2020-01-07 | End: 2020-01-09 | Stop reason: HOSPADM

## 2020-01-07 RX ORDER — OXYCODONE HYDROCHLORIDE 5 MG/1
5 TABLET ORAL
Status: DISCONTINUED | OUTPATIENT
Start: 2020-01-07 | End: 2020-01-09 | Stop reason: HOSPADM

## 2020-01-07 RX ORDER — POLYETHYLENE GLYCOL 3350 17 G/17G
1 POWDER, FOR SOLUTION ORAL
Status: DISCONTINUED | OUTPATIENT
Start: 2020-01-07 | End: 2020-01-09 | Stop reason: HOSPADM

## 2020-01-07 RX ORDER — ATORVASTATIN CALCIUM 80 MG/1
80 TABLET, FILM COATED ORAL EVERY EVENING
Status: DISCONTINUED | OUTPATIENT
Start: 2020-01-07 | End: 2020-01-09 | Stop reason: HOSPADM

## 2020-01-07 RX ORDER — BISACODYL 10 MG
10 SUPPOSITORY, RECTAL RECTAL
Status: DISCONTINUED | OUTPATIENT
Start: 2020-01-07 | End: 2020-01-09 | Stop reason: HOSPADM

## 2020-01-07 RX ORDER — OMEPRAZOLE 20 MG/1
20 CAPSULE, DELAYED RELEASE ORAL DAILY
Status: DISCONTINUED | OUTPATIENT
Start: 2020-01-07 | End: 2020-01-09 | Stop reason: HOSPADM

## 2020-01-07 RX ORDER — HYDROXYZINE PAMOATE 100 MG
100 CAPSULE ORAL 2 TIMES DAILY PRN
Status: DISCONTINUED | OUTPATIENT
Start: 2020-01-07 | End: 2020-01-09

## 2020-01-07 RX ORDER — ONDANSETRON 2 MG/ML
4 INJECTION INTRAMUSCULAR; INTRAVENOUS EVERY 4 HOURS PRN
Status: DISCONTINUED | OUTPATIENT
Start: 2020-01-07 | End: 2020-01-09 | Stop reason: HOSPADM

## 2020-01-07 RX ADMIN — INSULIN HUMAN 2 UNITS: 100 INJECTION, SOLUTION PARENTERAL at 16:49

## 2020-01-07 RX ADMIN — PRAZOSIN HYDROCHLORIDE 2 MG: 2 CAPSULE ORAL at 21:24

## 2020-01-07 RX ADMIN — LISINOPRIL 40 MG: 20 TABLET ORAL at 16:47

## 2020-01-07 RX ADMIN — ATORVASTATIN CALCIUM 80 MG: 80 TABLET, FILM COATED ORAL at 18:06

## 2020-01-07 RX ADMIN — AMLODIPINE BESYLATE 10 MG: 10 TABLET ORAL at 16:46

## 2020-01-07 RX ADMIN — CLOPIDOGREL BISULFATE 75 MG: 75 TABLET ORAL at 16:47

## 2020-01-07 RX ADMIN — OMEPRAZOLE 20 MG: 20 CAPSULE, DELAYED RELEASE ORAL at 16:46

## 2020-01-07 RX ADMIN — METOPROLOL TARTRATE 25 MG: 25 TABLET, FILM COATED ORAL at 18:07

## 2020-01-07 NOTE — ED NOTES
Pt has not taken any medications for > 2 weeks. All medications remain on med rec.  No antibiotic use in last 14 days.    [facility] using [facility].

## 2020-01-07 NOTE — ED NOTES
Pt amb to the restroom w/ stand by assist.  Unsteady at times due to vertigo.  Pt states no other needs at this time.

## 2020-01-07 NOTE — ED TRIAGE NOTES
BIB [facility] to [facility] w/ c/o SI.  Pt has been off all of his meds x 2.5 weeks.  Reports feeling anxious & depressed.  Reports SI w/ a plan of jumping off of a bridge x 3 days.  Pt denies intention of acting on this.  En route pt developed substernal chest pain.  Hx of MI w/ stents.  Given ASA 324mg pta, Ntg .4mg x2.  Pt arrives w/ a reports of cp 3/10.  Moderate risk on [place] screening.  Pt placed in gown, all of belongings & potentially dangerous objects removed from room.  Sitter in front of doorway for 1:1 obs.  CP protocol ordered.  On monitor.  ERP to the bedside.

## 2020-01-07 NOTE — ED PROVIDER NOTES
ED Provider Note    Scribed for Blanca Basilio M.D. by Hector Doyle. 1/7/2020, 1:14 PM.    Primary care provider: Pcp Pt States None  Means of arrival: Ambulance  History obtained from: Patient  History limited by: None    CHIEF COMPLAINT  Chief Complaint   Patient presents with   • Suicidal Ideation   • Chest Pain       HPI  Rajiv Sam is a 59 y.o. male who presents to the Emergency Department for evaluation of suicidal ideations. The patient reports onset of suicidal ideations after he ran out of his prescribed Seroquel and Wellbutrin 2 weeks ago. He reports a current plan to jump off an 80 foot building. The patient states he was seen for similar suicidal ideations a few months ago. He notes he is trying to establish a new PCP in [facility], but does not have an appointment until 1/15. The patient denies any homicidal ideations.     The patient additionally complains of intermittent chest pain onset 4 days ago. He reports the episodes of chest pain last for approximately one minute before resolving, and states his most recent episode occurred at 10 AM today while at rest. Deep breaths exacerbate his chest pain. He states he is typically prescribed Nitroglycerin to alleviate his chest pains, but does not have a current prescription. The patient does note a significant cardiac history and has had multiple cardiac stents place in the past, reporting the most recent stent one year ago. He was previously followed by Dr. Caban (Cardiology) in [place], but is now scheduled to see another Cardiologist in February of this year. The patient has no complaints of associated shortness of breath or fever.  Patient was given nitroglycerin by the ambulance which has essentially resolved his chest pain.    REVIEW OF SYSTEMS  Pertinent positives include suicidal ideations and chest pain. Pertinent negatives include no homicidal ideations, shortness of breath, syncope, drug use, productive cough, leg swelling or fever. All other  "systems reviewed and negative.     PAST MEDICAL HISTORY   has a past medical history of Hypertension, Old MI (myocardial infarction), Psychiatric disorder, and PTSD (post-traumatic stress disorder).    SURGICAL HISTORY   has a past surgical history that includes cardiac cath, left heart.    SOCIAL HISTORY  Social History     Tobacco Use   • Smoking status: Current Every Day Smoker   • Smokeless tobacco: Never Used   Substance Use Topics   • Alcohol use: No   • Drug use: Yes     Types: Inhaled     Comment: pot      Social History     Substance and Sexual Activity   Drug Use Yes   • Types: Inhaled    Comment: pot       FAMILY HISTORY  Family History   Problem Relation Age of Onset   • Cancer Mother        CURRENT MEDICATIONS  Home Medications    **Home medications have not yet been reviewed for this encounter**         ALLERGIES  Allergies   Allergen Reactions   • Pcn [Penicillins] Rash     Rash       PHYSICAL EXAM  VITAL SIGNS: /88   Pulse 83   Temp 36.7 °C (98.1 °F)   Resp 18   Ht 1.626 m (5' 4\")   Wt 54.4 kg (120 lb)   SpO2 96%   BMI 20.60 kg/m²     Constitutional: Male lying on stretcher. Well developed, No acute distress, Non-toxic appearance.   HENT: Normocephalic, Atraumatic, Bilateral external ears normal, Oropharynx moist, No oral exudates, Nose normal.   Eyes: PERRL, EOMI, Conjunctiva normal   Neck: Normal range of motion, No tenderness, Supple  Cardiovascular: Normal heart rate, Normal rhythm  Thorax & Lungs: Normal breath sounds, No respiratory distress.   Abdomen: Benign abdominal exam, no guarding no rebound, no tenderness, no distention  Skin: Warm, Dry, No erythema, No rash.   Back: No tenderness, No CVA tenderness.   Extremities: Intact distal pulses, No edema, No tenderness   Neurologic: Alert & oriented x 3, Normal motor function, Normal sensory function, No focal deficits noted.   Psychiatric: Positive Suicidal ideations.                                         DIAGNOSTIC STUDIES / " PROCEDURES\    LABS  Results for orders placed or performed during the hospital encounter of 01/07/20   CBC with Differential   Result Value Ref Range    WBC 8.7 4.8 - 10.8 K/uL    RBC 4.36 (L) 4.70 - 6.10 M/uL    Hemoglobin 13.7 (L) 14.0 - 18.0 g/dL    Hematocrit 40.7 (L) 42.0 - 52.0 %    MCV 93.3 81.4 - 97.8 fL    MCH 31.4 27.0 - 33.0 pg    MCHC 33.7 33.7 - 35.3 g/dL    RDW 48.4 35.9 - 50.0 fL    Platelet Count 369 164 - 446 K/uL    MPV 8.8 (L) 9.0 - 12.9 fL    Neutrophils-Polys 78.60 (H) 44.00 - 72.00 %    Lymphocytes 11.60 (L) 22.00 - 41.00 %    Monocytes 7.70 0.00 - 13.40 %    Eosinophils 0.30 0.00 - 6.90 %    Basophils 0.80 0.00 - 1.80 %    Immature Granulocytes 1.00 (H) 0.00 - 0.90 %    Nucleated RBC 0.00 /100 WBC    Neutrophils (Absolute) 6.83 1.82 - 7.42 K/uL    Lymphs (Absolute) 1.01 1.00 - 4.80 K/uL    Monos (Absolute) 0.67 0.00 - 0.85 K/uL    Eos (Absolute) 0.03 0.00 - 0.51 K/uL    Baso (Absolute) 0.07 0.00 - 0.12 K/uL    Immature Granulocytes (abs) 0.09 0.00 - 0.11 K/uL    NRBC (Absolute) 0.00 K/uL   Complete Metabolic Panel (CMP)   Result Value Ref Range    Sodium 131 (L) 135 - 145 mmol/L    Potassium 3.8 3.6 - 5.5 mmol/L    Chloride 101 96 - 112 mmol/L    Co2 23 20 - 33 mmol/L    Anion Gap 7.0 0.0 - 11.9    Glucose 178 (H) 65 - 99 mg/dL    Bun 10 8 - 22 mg/dL    Creatinine 0.90 0.50 - 1.40 mg/dL    Calcium 8.8 8.5 - 10.5 mg/dL    AST(SGOT) 17 12 - 45 U/L    ALT(SGPT) 13 2 - 50 U/L    Alkaline Phosphatase 74 30 - 99 U/L    Total Bilirubin 0.6 0.1 - 1.5 mg/dL    Albumin 3.8 3.2 - 4.9 g/dL    Total Protein 6.1 6.0 - 8.2 g/dL    Globulin 2.3 1.9 - 3.5 g/dL    A-G Ratio 1.7 g/dL   Troponin   Result Value Ref Range    Troponin T 7 6 - 19 ng/L   ESTIMATED GFR   Result Value Ref Range    GFR If African American >60 >60 mL/min/1.73 m 2    GFR If Non African American >60 >60 mL/min/1.73 m 2   EKG   Result Value Ref Range    Report       [facility]    Test Date:   2020  Pt Name:    EVERARDO JACKSON                Department: ER  MRN:        [id number]                      Room:       [facility]  Gender:     Male                         Technician: [id number]  :        1960                   Requested By:ER TRIAGE PROTOCOL  Order #:    [id number]                    Reading MD: Blanca Malone    Measurements  Intervals                                Axis  Rate:       63                           P:          62  FL:         124                          QRS:        158  QRSD:       122                          T:          38  QT:         436  QTc:        447    Interpretive Statements  SINUS RHYTHM  RBBB AND LPFB  Compared to ECG 2019 17:30:43  Right bundle-branch block now present  Electronically Signed On 2020 13:29:04 PST by Blanca Malone       All labs reviewed by me.    EKG  Interpreted by me, as shown above.     RADIOLOGY  DX-CHEST-PORTABLE (1 VIEW)   Final Result      No acute cardiopulmonary disease evident.        The radiologist's interpretation of all radiological studies have been reviewed by me.    COURSE & MEDICAL DECISION MAKING  Nursing notes, VS, PMSFHx reviewed in chart.     1:10 PM - Obtained and reviewed past medical records from 2019 that indicates patient had PCI with stent placement at that time.     1:14 PM Patient seen and examined at bedside. The patient presents with chest pain and the differential diagnosis includes but is not limited to Chest wall pain vs ACS, no hypoxia doubt pulmonary etiology, no recent illness to suggest infectious etiology. Patient with SI, off his psychiatric medications. Will also need to be evaluated by Behavioral Health. Ordered for DX-Chest (1 view), CBC with differential, CMP, Troponin, and EKG to evaluate.    Patient has been resting comfortably here in the emergency department.  He has not complained of a return of his chest pain.  EKG shows a right bundle branch block but no evidence of an acute MI.   Initial troponin was normal.  Due to the fact the patient has been off his cardiac medications including his Plavix and with his recurrent chest pain I do not feel he is a candidate for outpatient management.  Patient was admitted to the hospital for medical clearance and chest pain rule out.  The legal hold has been started but is pending medical clearance before it can be completed.    2:08 PM I discussed the patient's case and the above findings with Dr. Jose A Walls (Hospitalist) who agrees to evaluate the patient for hospitalization.    2:16 PM - Patient was reevaluated at bedside. He is resting comfortably in bed. Discussed lab and radiology results with the patient. He was updated with plan for admission. The patient is understanding and agreeable with plan of care.      DISPOSITION:  Patient will be hospitalized by Dr. Jose A Walls (Hospitalist) in guarded condition.     FINAL IMPRESSION  1. Suicidal ideation    2. Acute chest pain    3. Hyponatremia          Hector ELDRIDGE (Scribpamela), am scribing for, and in the presence of, Blanca Basilio M.D..    Electronically signed by: Hector Doyle (Scribe), 1/7/2020    IBlanca M.D. personally performed the services described in this documentation, as scribed by Hector Doyle in my presence, and it is both accurate and complete.    C.    The note accurately reflects work and decisions made by me.  Blanca Basilio  1/7/2020  2:52 PM

## 2020-01-08 ENCOUNTER — APPOINTMENT (OUTPATIENT)
Dept: RADIOLOGY | Facility: MEDICAL CENTER | Age: 60
End: 2020-01-08
Attending: HOSPITALIST
Payer: MEDICARE

## 2020-01-08 LAB
ANION GAP SERPL CALC-SCNC: 10 MMOL/L (ref 0–11.9)
BASOPHILS # BLD AUTO: 0.8 % (ref 0–1.8)
BASOPHILS # BLD: 0.07 K/UL (ref 0–0.12)
BUN SERPL-MCNC: 12 MG/DL (ref 8–22)
CALCIUM SERPL-MCNC: 8.6 MG/DL (ref 8.5–10.5)
CHLORIDE SERPL-SCNC: 100 MMOL/L (ref 96–112)
CO2 SERPL-SCNC: 21 MMOL/L (ref 20–33)
CREAT SERPL-MCNC: 0.87 MG/DL (ref 0.5–1.4)
EKG IMPRESSION: NORMAL
EOSINOPHIL # BLD AUTO: 0.33 K/UL (ref 0–0.51)
EOSINOPHIL NFR BLD: 4 % (ref 0–6.9)
ERYTHROCYTE [DISTWIDTH] IN BLOOD BY AUTOMATED COUNT: 48.7 FL (ref 35.9–50)
EST. AVERAGE GLUCOSE BLD GHB EST-MCNC: 189 MG/DL
GLUCOSE BLD-MCNC: 144 MG/DL (ref 65–99)
GLUCOSE BLD-MCNC: 160 MG/DL (ref 65–99)
GLUCOSE BLD-MCNC: 179 MG/DL (ref 65–99)
GLUCOSE BLD-MCNC: 195 MG/DL (ref 65–99)
GLUCOSE BLD-MCNC: 211 MG/DL (ref 65–99)
GLUCOSE SERPL-MCNC: 217 MG/DL (ref 65–99)
HBA1C MFR BLD: 8.2 % (ref 0–5.6)
HCT VFR BLD AUTO: 42.3 % (ref 42–52)
HGB BLD-MCNC: 14.2 G/DL (ref 14–18)
IMM GRANULOCYTES # BLD AUTO: 0.04 K/UL (ref 0–0.11)
IMM GRANULOCYTES NFR BLD AUTO: 0.5 % (ref 0–0.9)
LYMPHOCYTES # BLD AUTO: 1.06 K/UL (ref 1–4.8)
LYMPHOCYTES NFR BLD: 12.9 % (ref 22–41)
MCH RBC QN AUTO: 31.1 PG (ref 27–33)
MCHC RBC AUTO-ENTMCNC: 33.6 G/DL (ref 33.7–35.3)
MCV RBC AUTO: 92.8 FL (ref 81.4–97.8)
MONOCYTES # BLD AUTO: 0.61 K/UL (ref 0–0.85)
MONOCYTES NFR BLD AUTO: 7.4 % (ref 0–13.4)
NEUTROPHILS # BLD AUTO: 6.13 K/UL (ref 1.82–7.42)
NEUTROPHILS NFR BLD: 74.4 % (ref 44–72)
NRBC # BLD AUTO: 0 K/UL
NRBC BLD-RTO: 0 /100 WBC
PLATELET # BLD AUTO: 364 K/UL (ref 164–446)
PMV BLD AUTO: 9 FL (ref 9–12.9)
POTASSIUM SERPL-SCNC: 3.9 MMOL/L (ref 3.6–5.5)
RBC # BLD AUTO: 4.56 M/UL (ref 4.7–6.1)
SODIUM SERPL-SCNC: 131 MMOL/L (ref 135–145)
WBC # BLD AUTO: 8.2 K/UL (ref 4.8–10.8)

## 2020-01-08 PROCEDURE — 700102 HCHG RX REV CODE 250 W/ 637 OVERRIDE(OP): Performed by: HOSPITALIST

## 2020-01-08 PROCEDURE — 36415 COLL VENOUS BLD VENIPUNCTURE: CPT

## 2020-01-08 PROCEDURE — 99226 PR SUBSEQUENT OBSERVATION CARE,LEVEL III: CPT | Performed by: INTERNAL MEDICINE

## 2020-01-08 PROCEDURE — 700102 HCHG RX REV CODE 250 W/ 637 OVERRIDE(OP): Performed by: PSYCHIATRY & NEUROLOGY

## 2020-01-08 PROCEDURE — 700111 HCHG RX REV CODE 636 W/ 250 OVERRIDE (IP)

## 2020-01-08 PROCEDURE — 82962 GLUCOSE BLOOD TEST: CPT | Mod: 91

## 2020-01-08 PROCEDURE — 93010 ELECTROCARDIOGRAM REPORT: CPT | Performed by: INTERNAL MEDICINE

## 2020-01-08 PROCEDURE — A9270 NON-COVERED ITEM OR SERVICE: HCPCS | Performed by: STUDENT IN AN ORGANIZED HEALTH CARE EDUCATION/TRAINING PROGRAM

## 2020-01-08 PROCEDURE — 700111 HCHG RX REV CODE 636 W/ 250 OVERRIDE (IP): Performed by: INTERNAL MEDICINE

## 2020-01-08 PROCEDURE — 90792 PSYCH DIAG EVAL W/MED SRVCS: CPT | Mod: GC | Performed by: PSYCHIATRY & NEUROLOGY

## 2020-01-08 PROCEDURE — A9502 TC99M TETROFOSMIN: HCPCS

## 2020-01-08 PROCEDURE — 700102 HCHG RX REV CODE 250 W/ 637 OVERRIDE(OP): Performed by: STUDENT IN AN ORGANIZED HEALTH CARE EDUCATION/TRAINING PROGRAM

## 2020-01-08 PROCEDURE — A9270 NON-COVERED ITEM OR SERVICE: HCPCS | Performed by: PSYCHIATRY & NEUROLOGY

## 2020-01-08 PROCEDURE — G0378 HOSPITAL OBSERVATION PER HR: HCPCS

## 2020-01-08 PROCEDURE — A9270 NON-COVERED ITEM OR SERVICE: HCPCS | Performed by: HOSPITALIST

## 2020-01-08 PROCEDURE — 96372 THER/PROPH/DIAG INJ SC/IM: CPT | Mod: XU

## 2020-01-08 PROCEDURE — 80048 BASIC METABOLIC PNL TOTAL CA: CPT

## 2020-01-08 PROCEDURE — 85025 COMPLETE CBC W/AUTO DIFF WBC: CPT

## 2020-01-08 RX ORDER — REGADENOSON 0.08 MG/ML
0.4 INJECTION, SOLUTION INTRAVENOUS ONCE
Status: COMPLETED | OUTPATIENT
Start: 2020-01-08 | End: 2020-01-08

## 2020-01-08 RX ORDER — REGADENOSON 0.08 MG/ML
INJECTION, SOLUTION INTRAVENOUS
Status: COMPLETED
Start: 2020-01-08 | End: 2020-01-08

## 2020-01-08 RX ORDER — LORAZEPAM 1 MG/1
1 TABLET ORAL 3 TIMES DAILY
Status: DISCONTINUED | OUTPATIENT
Start: 2020-01-08 | End: 2020-01-09 | Stop reason: HOSPADM

## 2020-01-08 RX ORDER — DIVALPROEX SODIUM 500 MG/1
500 TABLET, EXTENDED RELEASE ORAL NIGHTLY
Status: DISCONTINUED | OUTPATIENT
Start: 2020-01-08 | End: 2020-01-09 | Stop reason: HOSPADM

## 2020-01-08 RX ORDER — NITROGLYCERIN 0.4 MG/1
0.4 TABLET SUBLINGUAL PRN
Status: DISCONTINUED | OUTPATIENT
Start: 2020-01-08 | End: 2020-01-09 | Stop reason: HOSPADM

## 2020-01-08 RX ADMIN — METOPROLOL TARTRATE 25 MG: 25 TABLET, FILM COATED ORAL at 17:30

## 2020-01-08 RX ADMIN — ASPIRIN 81 MG: 81 TABLET, COATED ORAL at 04:58

## 2020-01-08 RX ADMIN — CLOPIDOGREL BISULFATE 75 MG: 75 TABLET ORAL at 04:58

## 2020-01-08 RX ADMIN — ENOXAPARIN SODIUM 40 MG: 100 INJECTION SUBCUTANEOUS at 10:45

## 2020-01-08 RX ADMIN — INSULIN HUMAN 2 UNITS: 100 INJECTION, SOLUTION PARENTERAL at 12:15

## 2020-01-08 RX ADMIN — OMEPRAZOLE 20 MG: 20 CAPSULE, DELAYED RELEASE ORAL at 04:59

## 2020-01-08 RX ADMIN — PRAZOSIN HYDROCHLORIDE 2 MG: 2 CAPSULE ORAL at 20:07

## 2020-01-08 RX ADMIN — DIVALPROEX SODIUM 500 MG: 500 TABLET, EXTENDED RELEASE ORAL at 20:08

## 2020-01-08 RX ADMIN — AMLODIPINE BESYLATE 10 MG: 10 TABLET ORAL at 04:58

## 2020-01-08 RX ADMIN — REGADENOSON 0.4 MG: 0.08 INJECTION, SOLUTION INTRAVENOUS at 08:14

## 2020-01-08 RX ADMIN — METOPROLOL TARTRATE 25 MG: 25 TABLET, FILM COATED ORAL at 04:58

## 2020-01-08 RX ADMIN — LORAZEPAM 1 MG: 1 TABLET ORAL at 20:07

## 2020-01-08 RX ADMIN — INSULIN HUMAN 2 UNITS: 100 INJECTION, SOLUTION PARENTERAL at 06:11

## 2020-01-08 RX ADMIN — INSULIN HUMAN 3 UNITS: 100 INJECTION, SOLUTION PARENTERAL at 20:05

## 2020-01-08 RX ADMIN — LISINOPRIL 40 MG: 20 TABLET ORAL at 04:58

## 2020-01-08 RX ADMIN — ATORVASTATIN CALCIUM 80 MG: 80 TABLET, FILM COATED ORAL at 17:30

## 2020-01-08 ASSESSMENT — COGNITIVE AND FUNCTIONAL STATUS - GENERAL
SUGGESTED CMS G CODE MODIFIER DAILY ACTIVITY: CH
MOBILITY SCORE: 20
MOVING FROM LYING ON BACK TO SITTING ON SIDE OF FLAT BED: A LITTLE
STANDING UP FROM CHAIR USING ARMS: A LITTLE
WALKING IN HOSPITAL ROOM: A LITTLE
DAILY ACTIVITIY SCORE: 24
SUGGESTED CMS G CODE MODIFIER MOBILITY: CJ
CLIMB 3 TO 5 STEPS WITH RAILING: A LITTLE

## 2020-01-08 ASSESSMENT — ENCOUNTER SYMPTOMS
COUGH: 0
HEADACHES: 0
DOUBLE VISION: 0
VOMITING: 0
CONSTIPATION: 0
SHORTNESS OF BREATH: 0
WEIGHT LOSS: 0
NECK PAIN: 0
FOCAL WEAKNESS: 0
FEVER: 0
ABDOMINAL PAIN: 0
BACK PAIN: 0
MYALGIAS: 0
ORTHOPNEA: 0
BLURRED VISION: 0
PHOTOPHOBIA: 0
SPEECH CHANGE: 0
NAUSEA: 0
DIZZINESS: 0
NERVOUS/ANXIOUS: 1
CHILLS: 0
SENSORY CHANGE: 0
EYE PAIN: 0
TREMORS: 0
TINGLING: 0
PALPITATIONS: 0
HALLUCINATIONS: 0
SPUTUM PRODUCTION: 0
DIARRHEA: 0

## 2020-01-08 ASSESSMENT — PATIENT HEALTH QUESTIONNAIRE - PHQ9
7. TROUBLE CONCENTRATING ON THINGS, SUCH AS READING THE NEWSPAPER OR WATCHING TELEVISION: MORE THAN HALF THE DAYS
1. LITTLE INTEREST OR PLEASURE IN DOING THINGS: NEARLY EVERY DAY
SUM OF ALL RESPONSES TO PHQ9 QUESTIONS 1 AND 2: 6
2. FEELING DOWN, DEPRESSED, IRRITABLE, OR HOPELESS: NEARLY EVERY DAY
3. TROUBLE FALLING OR STAYING ASLEEP OR SLEEPING TOO MUCH: MORE THAN HALF THE DAYS
6. FEELING BAD ABOUT YOURSELF - OR THAT YOU ARE A FAILURE OR HAVE LET YOURSELF OR YOUR FAMILY DOWN: MORE THAN HALF THE DAYS
SUM OF ALL RESPONSES TO PHQ QUESTIONS 1-9: 18
4. FEELING TIRED OR HAVING LITTLE ENERGY: MORE THAN HALF THE DAYS
9. THOUGHTS THAT YOU WOULD BE BETTER OFF DEAD, OR OF HURTING YOURSELF: MORE THAN HALF THE DAYS
5. POOR APPETITE OR OVEREATING: MORE THAN HALF THE DAYS
8. MOVING OR SPEAKING SO SLOWLY THAT OTHER PEOPLE COULD HAVE NOTICED. OR THE OPPOSITE, BEING SO FIGETY OR RESTLESS THAT YOU HAVE BEEN MOVING AROUND A LOT MORE THAN USUAL: NOT AT ALL

## 2020-01-08 ASSESSMENT — LIFESTYLE VARIABLES
ON A TYPICAL DAY WHEN YOU DRINK ALCOHOL HOW MANY DRINKS DO YOU HAVE: 0
TOTAL SCORE: 0
HOW MANY TIMES IN THE PAST YEAR HAVE YOU HAD 5 OR MORE DRINKS IN A DAY: 0
EVER HAD A DRINK FIRST THING IN THE MORNING TO STEADY YOUR NERVES TO GET RID OF A HANGOVER: NO
DOES PATIENT WANT TO STOP DRINKING: NO
HAVE YOU EVER FELT YOU SHOULD CUT DOWN ON YOUR DRINKING: NO
TOTAL SCORE: 0
SUBSTANCE_ABUSE: 0
ALCOHOL_USE: YES
HAVE PEOPLE ANNOYED YOU BY CRITICIZING YOUR DRINKING: NO
EVER FELT BAD OR GUILTY ABOUT YOUR DRINKING: NO
AVERAGE NUMBER OF DAYS PER WEEK YOU HAVE A DRINK CONTAINING ALCOHOL: 1
CONSUMPTION TOTAL: NEGATIVE
TOTAL SCORE: 0

## 2020-01-08 NOTE — H&P
Hospital Medicine History & Physical Note    Date of Service  1/7/2020    Primary Care Physician  Pcp Pt States None    Consultants  Psychiatry    Code Status  Full code    Chief Complaint  Chest pain and suicidal ideation    History of Presenting Illness  59 y.o. male who presented 1/7/2020 with known history of coronary artery disease PTSD and bipolar disorder with prior episodes of inpatient psychiatric hospitalizations for suicidal ideation presented to the emergency room for evaluation of chest pain and recurrent suicidal ideation.  He ran out of all his medications about 2 weeks ago including his Wellbutrin and Seroquel.  He reports that he recently moved to [place] from [place] and is scheduled to establish with a new PCP on 15 January and with a new cardiologist in February.  He has been having recurrent episodes of chest pain described as dull mild to moderate lasting from a few minutes up to 40 minutes with no specific exacerbating or relieving factors.  He denies any associated dyspnea.  His chest pain was radiating to his left shoulder and left arm.  His chest pain resolved spontaneously earlier today and has not recurred.  He denies any chest pain at the time of my examination.  He has been having recurrent suicidal ideations with plans to jump of [address] building.  He denies any hallucinations.  He has a prior history of coronary artery disease and reports having 4 stents last intervention was a little over a year ago in [place].    Review of Systems  Review of Systems   All other systems reviewed and are negative.      Past Medical History   has a past medical history of Hypertension, Old MI (myocardial infarction), Psychiatric disorder, and PTSD (post-traumatic stress disorder).    Surgical History   has a past surgical history that includes cardiac cath, left heart.     Family History  family history includes Cancer in his mother.     Social History   reports that he has been smoking. He  has never used smokeless tobacco. He reports current drug use. Drug: Inhaled. He reports that he does not drink alcohol.    Allergies  Allergies   Allergen Reactions   • Pcn [Penicillins] Rash     Rash       Medications  Prior to Admission Medications   Prescriptions Last Dose Informant Patient Reported? Taking?   B Complex Vitamins (VITAMIN B COMPLEX) Tab > 2 weeks at unknown Patient Yes Yes   Sig: Take 1 Tab by mouth every day.   QUEtiapine (SEROQUEL) 100 MG Tab > 2 weeks at unknown Patient Yes No   Sig: Take 100 mg by mouth 2 times a day. In the morning & afternoon   QUEtiapine (SEROQUEL) 200 MG Tab > 2 weeks at unknown Patient Yes No   Sig: Take 200 mg by mouth every bedtime.   amLODIPine (NORVASC) 10 MG Tab > 2 weeks at unknown Patient No No   Sig: Take 1 Tab by mouth every day.   aspirin EC (ECOTRIN) 81 MG Tablet Delayed Response [facility] No No   Sig: Take 1 Tab by mouth every day.   atorvastatin (LIPITOR) 80 MG tablet > 2 weeks at unknown Patient No No   Sig: Take 1 Tab by mouth every evening.   buPROPion (WELLBUTRIN XL) 300 MG XL tablet > 2 weeks at unknown Patient Yes No   Sig: Take 300 mg by mouth every morning.   clopidogrel (PLAVIX) 75 MG Tab > 2 weeks at [facility] Patient Yes Yes   Sig: Take 75 mg by mouth every day.   hydrOXYzine pamoate (VISTARIL) 100 MG Cap > 2 week at unknown Patient Yes No   Sig: Take 100 mg by mouth 2 times a day as needed for Anxiety.   lisinopril (PRINIVIL, ZESTRIL) 40 MG tablet > 2 weeks at [facility] Patient Yes No   Sig: Take 40 mg by mouth every day.   metformin (GLUCOPHAGE) 1000 MG tablet > 2 weeks at unknown Patient Yes Yes   Sig: Take 1,000 mg by mouth 2 times a day, with meals.   metoprolol (LOPRESSOR) 25 MG Tab > 2 weeks at unknown Patient Yes Yes   Sig: Take 25 mg by mouth 2 times a day.   omeprazole (PRILOSEC) 20 MG delayed-release capsule > 2 weeks at unknown Patient Yes Yes   Sig: Take 20 mg by mouth every day.   prazosin (MINIPRESS) 2 MG Cap > 2 weeks  at unknown Patient Yes No   Sig: Take 2 mg by mouth every evening.      Facility-Administered Medications: None       Physical Exam  Temp:  [36.7 °C (98.1 °F)] 36.7 °C (98.1 °F)  Pulse:  [63-94] 94  Resp:  [18] 18  BP: (159-170)/() 170/106  SpO2:  [96 %-97 %] 96 %    Physical Exam  Vitals signs and nursing note reviewed.   Constitutional:       Appearance: He is well-developed. He is not diaphoretic.   HENT:      Head: Normocephalic and atraumatic.      Mouth/Throat:      Pharynx: No oropharyngeal exudate.   Eyes:      General: No scleral icterus.        Right eye: No discharge.         Left eye: No discharge.      Conjunctiva/sclera: Conjunctivae normal.      Pupils: Pupils are equal, round, and reactive to light.   Neck:      Musculoskeletal: Neck supple.      Vascular: No JVD.      Trachea: No tracheal deviation.   Cardiovascular:      Rate and Rhythm: Normal rate and regular rhythm.      Heart sounds: No murmur. No friction rub. No gallop.    Pulmonary:      Effort: Pulmonary effort is normal. No respiratory distress.      Breath sounds: Normal breath sounds. No stridor. No wheezing.   Chest:      Chest wall: No tenderness.   Abdominal:      General: Bowel sounds are normal. There is no distension.      Palpations: Abdomen is soft.      Tenderness: There is no tenderness. There is no rebound.   Musculoskeletal:         General: No tenderness.   Skin:     General: Skin is warm and dry.      Nails: There is no clubbing.     Neurological:      Mental Status: He is alert and oriented to person, place, and time.      Cranial Nerves: No cranial nerve deficit.      Motor: No abnormal muscle tone.   Psychiatric:         Behavior: Behavior normal.         Laboratory:  Recent Labs     01/07/20  1317   WBC 8.7   RBC 4.36*   HEMOGLOBIN 13.7*   HEMATOCRIT 40.7*   MCV 93.3   MCH 31.4   MCHC 33.7   RDW 48.4   PLATELETCT 369   MPV 8.8*     Recent Labs     01/07/20  1317   SODIUM 131*   POTASSIUM 3.8   CHLORIDE 101   CO2  23   GLUCOSE 178*   BUN 10   CREATININE 0.90   CALCIUM 8.8     Recent Labs     01/07/20  1317   ALTSGPT 13   ASTSGOT 17   ALKPHOSPHAT 74   TBILIRUBIN 0.6   GLUCOSE 178*         No results for input(s): NTPROBNP in the last 72 hours.      Recent Labs     01/07/20  1317   TROPONINT 7       Urinalysis:    No results found     Imaging:  DX-CHEST-PORTABLE (1 VIEW)   Final Result      No acute cardiopulmonary disease evident.      NM-CARDIAC STRESS TEST    (Results Pending)         Assessment/Plan:  I anticipate this patient is appropriate for observation status at this time.    Other chest pain  Assessment & Plan  Given his known history of CAD patient will be admitted and monitored on telemetry with serial troponin  I will resume his medical therapy with aspirin Plavix metoprolol lisinopril atorvastatin  If his repeat troponin and EKG remain negative we will set him up for nuclear medicine stress test  Reinforced with the patient importance of complying with medical therapy    Suicidal ideation- (present on admission)  Assessment & Plan  Legal hold has been initiated in the emergency department  Patient will be placed on suicidal precautions  We will ask for psychiatry evaluation    Type 2 diabetes mellitus with hyperglycemia, without long-term current use of insulin (HCC)- (present on admission)  Assessment & Plan  We will start him on sliding scale insulin monitor CBGs  He was previously maintained on metformin will consider resuming on discharge  We will check HbA1c    PTSD (post-traumatic stress disorder)- (present on admission)  Assessment & Plan  We will resume his prazosin and as needed Vistaril  We will ask for psychiatry evaluation and restart psychiatric medications per psych recommendations    Hypertension- (present on admission)  Assessment & Plan  Uncontrolled secondary to noncompliance  I will resume his amlodipine lisinopril metoprolol and monitor blood pressure    Hyponatremia  Assessment & Plan  Acute  on chronic    Restrict free fluids and monitor levels      VTE prophylaxis: SCD

## 2020-01-08 NOTE — PROGRESS NOTES
2 RN skin check complete with NATALIE Vela.   Devices in place NA.  Skin assessed under devices NA.  Confirmed pressure ulcers found on NA.  New potential pressure ulcers noted on NA. Wound consult placed NA.  The following interventions in place NA    Scratches BLLE, discoloration on toe nails. Otherwise skin intact.

## 2020-01-08 NOTE — CARE PLAN
Problem: Communication  Goal: The ability to communicate needs accurately and effectively will improve  Outcome: PROGRESSING AS EXPECTED  Intervention: [facility] patient and significant other/support system to call light to alert staff of needs  Flowsheets (Taken 1/8/2020 8944)  Oriented to:: All of the Following : Location of Bathroom, Visiting Policy, Unit Routine, Call Light and Bedside Controls, Bedside Rail Policy, Smoking Policy, Rights and Responsibilities, Bedside Report, and Patient Education Notebook     Problem: Safety  Goal: Will remain free from injury  Outcome: PROGRESSING AS EXPECTED  Goal: Will remain free from falls  Outcome: PROGRESSING AS EXPECTED     Problem: Infection  Goal: Will remain free from infection  Outcome: PROGRESSING AS EXPECTED

## 2020-01-08 NOTE — PROGRESS NOTES
Report received. Pt care assumed. Assessment performed. Pt AOx4. Pt laying supine in bed. Pt denies pain and no signs of distress. Bed in low, locked position. Pt c/o 3/10 R hip pain and no signs of distress. Pt declines medication at this time. Bed in low, locked position. Treaded socks on pt. Sitter at bedside.     0730 Pt down to nuc med for stress test with transport. Sitter with pt. Monitors notified.

## 2020-01-08 NOTE — CARE PLAN
Problem: Communication  Goal: The ability to communicate needs accurately and effectively will improve  Outcome: PROGRESSING AS EXPECTED  Note:   Update pt on POC. Answer questions as needed.      Problem: Safety  Goal: Will remain free from injury  Outcome: PROGRESSING AS EXPECTED  Note:   Pt educated on fall precautions. Bed in low, locked position. Bed alarm on. Treaded socks on pt. Sitter at bedside. SI checklist in place.

## 2020-01-08 NOTE — ED NOTES
Pt resting on R side, eyes closed, resp even and unlabored.  Pt has admit orders, awaiting a bed assignment.  Sitter remains outside of doorway for direct obs.

## 2020-01-08 NOTE — ASSESSMENT & PLAN NOTE
Legal hold has been initiated in the emergency department  Continue suicidal precautions.  Psychiatry evaluated him.

## 2020-01-08 NOTE — PROGRESS NOTES
Hospital Medicine Daily Progress Note    Date of Service  1/8/2020    Chief Complaint  59 y.o. male admitted 1/7/2020 with chest pain and suicidal ideation    Hospital Course    *59-year-old male with past medical history of coronary artery disease, PTSD and bipolar disorder presented to the hospital on January 7, 2020 with complaint of chest pain and suicidal ideation*      Interval Problem Update  I evaluated and examined him at the bedside.  He denies acute symptoms of chest pain.  He has been having off-and-on symptoms of suicidal ideation.  Troponin remain negative.  I requested consult with cardiology and discussed his nuclear medicine is stress testing results.  Currently on legal hold    Consultants/Specialty  Psychiatry  Cardiology  Code Status  Full code    Disposition  To be decided    Review of Systems  Review of Systems   Constitutional: Negative for chills, fever and weight loss.   HENT: Negative for hearing loss and tinnitus.    Eyes: Negative for blurred vision, double vision, photophobia and pain.   Respiratory: Negative for cough, sputum production and shortness of breath.    Cardiovascular: Negative for chest pain, palpitations, orthopnea and leg swelling.   Gastrointestinal: Negative for abdominal pain, constipation, diarrhea, nausea and vomiting.   Genitourinary: Negative for dysuria, frequency and urgency.   Musculoskeletal: Negative for back pain, joint pain, myalgias and neck pain.   Skin: Negative for rash.   Neurological: Negative for dizziness, tingling, tremors, sensory change, speech change, focal weakness and headaches.   Psychiatric/Behavioral: Positive for suicidal ideas. Negative for hallucinations and substance abuse. The patient is nervous/anxious.    All other systems reviewed and are negative.       Physical Exam  Temp:  [36.3 °C (97.4 °F)-37.1 °C (98.7 °F)] 36.3 °C (97.4 °F)  Pulse:  [60-94] 84  Resp:  [16-18] 16  BP: (111-178)/() 111/78  SpO2:  [96 %-98 %] 97  %    Physical Exam  Vitals signs reviewed.   Constitutional:       General: He is not in acute distress.  HENT:      Head: Normocephalic and atraumatic. No contusion.      Right Ear: External ear normal.      Left Ear: External ear normal.      Nose: Nose normal.      Mouth/Throat:      Mouth: Mucous membranes are moist.      Pharynx: No oropharyngeal exudate.   Eyes:      General:         Right eye: No discharge.         Left eye: No discharge.      Pupils: Pupils are equal, round, and reactive to light.   Neck:      Musculoskeletal: No neck rigidity or muscular tenderness.   Cardiovascular:      Rate and Rhythm: Normal rate and regular rhythm.      Heart sounds: No murmur. No friction rub. No gallop.    Pulmonary:      Effort: Pulmonary effort is normal.      Breath sounds: No wheezing or rhonchi.   Abdominal:      General: Bowel sounds are normal. There is no distension.      Palpations: Abdomen is soft.      Tenderness: There is no tenderness. There is no rebound.   Musculoskeletal: Normal range of motion.         General: No swelling or tenderness.   Skin:     General: Skin is warm and dry.      Coloration: Skin is not jaundiced.   Neurological:      General: No focal deficit present.      Mental Status: He is alert.      Cranial Nerves: No cranial nerve deficit.      Sensory: No sensory deficit.   Psychiatric:         Mood and Affect: Mood is anxious.         Fluids    Intake/Output Summary (Last 24 hours) at 1/8/2020 0718  Last data filed at 1/7/2020 1800  Gross per 24 hour   Intake 240 ml   Output 300 ml   Net -60 ml       Laboratory  Recent Labs     01/07/20  1317 01/08/20  0218   WBC 8.7 8.2   RBC 4.36* 4.56*   HEMOGLOBIN 13.7* 14.2   HEMATOCRIT 40.7* 42.3   MCV 93.3 92.8   MCH 31.4 31.1   MCHC 33.7 33.6*   RDW 48.4 48.7   PLATELETCT 369 364   MPV 8.8* 9.0     Recent Labs     01/07/20  1317 01/08/20  0218   SODIUM 131* 131*   POTASSIUM 3.8 3.9   CHLORIDE 101 100   CO2 23 21   GLUCOSE 178* 217*   BUN 10 12    CREATININE 0.90 0.87   CALCIUM 8.8 8.6                   Imaging  DX-CHEST-PORTABLE (1 VIEW)   Final Result      No acute cardiopulmonary disease evident.      NM-CARDIAC STRESS TEST    (Results Pending)        Assessment/Plan  Other chest pain  Assessment & Plan  He has history of coronary artery disease.  Serial troponin remain negative.  Nuclear medicine stress test was ordered.  I discussed nuclear medicine stress test result with cardiology and requested consult.  Continue to monitor closely on telemetry.    Suicidal ideation- (present on admission)  Assessment & Plan  Legal hold has been initiated in the emergency department  Continue suicidal precautions.  Psychiatry consult requested.      Type 2 diabetes mellitus with hyperglycemia, without long-term current use of insulin (HCC)- (present on admission)  Assessment & Plan  Continue insulin sliding scale with hypoglycemia protocol.  HbA1c is 8.2.      PTSD (post-traumatic stress disorder)- (present on admission)  Assessment & Plan  Continue prazosin and as needed Vistaril  Psychiatry consult requested.      Hypertension- (present on admission)  Assessment & Plan  Uncontrolled secondary to noncompliance  After resuming his home blood pressure medication blood pressure is under better control.    Hyponatremia  Assessment & Plan  Acute on chronic  Could be due to pseudohyponatremia due to hyperglycemia.  Continue to monitor.     I discussed plan of care during multidisciplinary hours.  I discussed his results of stress test with cardiology and requested consult.    VTE prophylaxis: Lovenox

## 2020-01-08 NOTE — ASSESSMENT & PLAN NOTE
He has history of coronary artery disease.  Serial troponin remain negative.  Nuclear medicine stress test was ordered.  I discussed nuclear medicine stress test result with cardiology and requested consult.  Cardiology recommended outpatient follow-up.  Continue to monitor closely on telemetry.

## 2020-01-08 NOTE — ASSESSMENT & PLAN NOTE
Continue prazosin and as needed Vistaril  Psychiatry consult requested and they evaluated him and made recommendations.

## 2020-01-08 NOTE — CONSULTS
BRIEF PSYCHIATRIC CONSULT NOTE: patient seen, full note to follow.  -Legal Hold: extended  -Sitter:yes  -Restrictions:   -phone:no   -visitors:no   -personal items: no   -finger foods required:  no   -personal/undergarments clothes: no   -medical bed:yes   -Orders Placed:see bellow  -Assessment:  -Bipolar 1 Disorder, current episode depressed  -PTSD    -Plan:   -START Depakote ER 500mg po qhs for mood  -B12, folate, TSH    Full psych eval to follow  :continue to follow

## 2020-01-08 NOTE — CONSULTS
Cardiology Consult    CC: Suicidal ideation  Reason for consult: Chest pain    HPI:  60 y/o M with a pmh of CAD( s/p 4 stents, 2 stents in 06/2017 at [facility] in circumflex, 1 stent in 08/2018 at [facility] for in-stent thrombosis, 1 stent 12/2018 in RCA at [facility]), DM2, HTN, depression, hx of SI, PTSD, came in complaining of suicidal ideation.  He says he had run out of all his medications and has not taken anything since 3 weeks.  He recently moved from [place] back to [place] and has not seen any providers here. Chest pain started about 4 days ago, substernal, occasionally radiates to left arm, associated with occasional diaphoresis and shortness of breath.  Pain not associated with exertion and lasts at most 1 minute.  Says he has been very anxious due to the lack of his medication since last time he ran out of medication he ended up getting a STEMI.  Has been chest pain-free since yesterday, no acute complaints other than the depression and suicidal ideation for which he is following with psych.    Troponin negative x3, EKG shows no acute abnormalities, nuclear medicine stress test showed inferior lateral infarct without ischemia consistent with known disease.    MEDICATIONS:    Current Facility-Administered Medications:   •  enoxaparin (LOVENOX) inj 40 mg, 40 mg, Subcutaneous, DAILY, Jim Good M.D., 40 mg at 01/08/20 1045  •  divalproex ER (DEPAKOTE ER) tablet 500 mg, 500 mg, Oral, Nightly, Seamus Muñiz M.D.  •  amLODIPine (NORVASC) tablet 10 mg, 10 mg, Oral, DAILY, Severino Walls M.D., 10 mg at 01/08/20 0458  •  aspirin EC (ECOTRIN) tablet 81 mg, 81 mg, Oral, DAILY, Severino Walls M.D., 81 mg at 01/08/20 0458  •  atorvastatin (LIPITOR) tablet 80 mg, 80 mg, Oral, Q EVENING, Severino Walls M.D., 80 mg at 01/07/20 1806  •  clopidogrel (PLAVIX) tablet 75 mg, 75 mg, Oral, DAILY, Severino Walls M.D., 75 mg at 01/08/20 0458  •  hydrOXYzine pamoate (VISTARIL) capsule 100 mg,  100 mg, Oral, BID PRN, Severino Walls M.D.  •  lisinopril (PRINIVIL) tablet 40 mg, 40 mg, Oral, DAILY, Severino aWlls M.D., 40 mg at 01/08/20 0458  •  metoprolol (LOPRESSOR) tablet 25 mg, 25 mg, Oral, BID, Severino Walls M.D., 25 mg at 01/08/20 0458  •  omeprazole (PRILOSEC) capsule 20 mg, 20 mg, Oral, DAILY, Severino Walls M.D., 20 mg at 01/08/20 0459  •  prazosin (MINIPRESS) capsule 2 mg, 2 mg, Oral, Nightly, Severino Walls M.D., 2 mg at 01/07/20 2124  •  senna-docusate (PERICOLACE or SENOKOT S) 8.6-50 MG per tablet 2 Tab, 2 Tab, Oral, BID **AND** polyethylene glycol/lytes (MIRALAX) PACKET 1 Packet, 1 Packet, Oral, QDAY PRN **AND** magnesium hydroxide (MILK OF MAGNESIA) suspension 30 mL, 30 mL, Oral, QDAY PRN **AND** bisacodyl (DULCOLAX) suppository 10 mg, 10 mg, Rectal, QDAY PRN, Severino Walls M.D.  •  acetaminophen (TYLENOL) tablet 650 mg, 650 mg, Oral, Q6HRS PRN, Severino Walls M.D.  •  Notify provider if pain remains uncontrolled, , , CONTINUOUS **AND** Use the numeric rating scale (NRS-11) on regular floors and Critical-Care Pain Observation Tool (CPOT) on ICUs/Trauma to assess pain, , , CONTINUOUS **AND** Pulse Ox (Oximetry), , , CONTINUOUS **AND** Pharmacy Consult Request ...Pain Management Review 1 Each, 1 Each, Other, PHARMACY TO DOSE **AND** If patient difficult to arouse and/or has respiratory depression, stop any opiates that are currently infusing and call a Rapid Response., , , CONTINUOUS **AND** oxyCODONE immediate-release (ROXICODONE) tablet 2.5 mg, 2.5 mg, Oral, Q3HRS PRN **AND** oxyCODONE immediate-release (ROXICODONE) tablet 5 mg, 5 mg, Oral, Q3HRS PRN **AND** HYDROmorphone pf (DILAUDID) injection 0.25 mg, 0.25 mg, Intravenous, Q3HRS PRN, Severino Walls M.D.  •  ondansetron (ZOFRAN) syringe/vial injection 4 mg, 4 mg, Intravenous, Q4HRS PRN, Severino Walls M.D.  •  ondansetron (ZOFRAN ODT) dispertab 4 mg, 4 mg, Oral, Q4HRS PRN, Severino Naranjo  JAN Walls  •  promethazine (PHENERGAN) tablet 12.5-25 mg, 12.5-25 mg, Oral, Q4HRS PRN, Severino Walls M.D.  •  promethazine (PHENERGAN) suppository 12.5-25 mg, 12.5-25 mg, Rectal, Q4HRS PRN, Severino Walls M.D.  •  prochlorperazine (COMPAZINE) injection 5-10 mg, 5-10 mg, Intravenous, Q4HRS PRN, Seevrino Walls M.D.  •  insulin regular (HUMULIN R) injection 2-9 Units, 2-9 Units, Subcutaneous, 4X/DAY ACHS, 2 Units at 01/08/20 1215 **AND** Accu-Chek ACHS, , , Q AC AND BEDTIME(S) **AND** NOTIFY MD and PharmD, , , Once **AND** glucose 4 g chewable tablet 16 g, 16 g, Oral, Q15 MIN PRN **AND** DEXTROSE 10% BOLUS 250 mL, 250 mL, Intravenous, Q15 MIN PRN, Severino Walls M.D.    ALLERGIES:   Mr. Sam  is allergic to pcn [penicillins].     SURGERIES:  Mr. Sam   has a past surgical history that includes cardiac cath, left heart.     MEDICAL ILLNESSES:  Mr. Sam   has a past medical history of Hypertension, Old MI (myocardial infarction), Psychiatric disorder, and PTSD (post-traumatic stress disorder).     SOCIAL HISTORY:  Mr. Sam   reports that he has been smoking. He has never used smokeless tobacco. He reports current drug use. Drug: Inhaled. He reports that he does not drink alcohol.     FAMILY HISTORY:  Mr.'s Sam  family history includes Cancer in his mother.      ROS:    Constitutional: Patient has had no fevers or chills or fatigue. Patient has has no significant weight change.  Eyes: Patient had no visual changes or vision loss.  ENT: Patient denies any sore throat or allergic rhinitis.  Respiratory: Patient has had no cough or sputum production. Also, no hemoptysis.  Cardiovascular: As noted above.  GI: Patient denies any nausea, vomiting, or diarrhea. Patient has had no hematemesis or melena.  : Patient denies any urinary urgency, frequency, or dysuria. Patient has noted no hematuria.  Orthopedic: Patient has no particular problem with arthritis. Patient is able to walk and  "exercise without difficulty.  Neurologic: Patient has had no focal numbness or weakness.   Psychiatric: SI depression  Endocrine: Patient denies any history of thyroid disorder or diabetes. Patient denies any heat or cold intolerance. In addition, patient denies any polydipsia or polyuria.  Hematologic: Patient has had no easy bruising or bleeding. Patient denies any history of anemia.  Skin: Patient denies any unusual rashes or skin lesions.  Allergic/immunologic: Patient denies any difficulty with hayfever or other environmental allergens. Patient denies any food allergies.       PHYSICAL EXAM:    /86   Pulse 83   Temp 36.8 °C (98.3 °F) (Temporal)   Resp 18   Ht 1.626 m (5' 4\")   Wt 54.1 kg (119 lb 4.3 oz)   SpO2 99%   BMI 20.47 kg/m²      General: in no acute distress.  HEENT: NC, AT, PERRL, EOMI, lids and conjunctiva are clear, moist oral mucosa. No JVD  Respit: CTAB, no wheezing or rales  Cardiac: RRR, no murmurs, S1 S2  Abdomen: soft, nontender, nondistended  Extremities: No clubbing, cyanosis, or edema is present.  Skin: warm  Neurologic: AOx3, No focal neurologic abnormality noted.    Lab Results   Component Value Date/Time    CHOLSTRLTOT 120 02/14/2019 03:22 AM    LDL 40 02/14/2019 03:22 AM    HDL 64 02/14/2019 03:22 AM    TRIGLYCERIDE 81 02/14/2019 03:22 AM       Lab Results   Component Value Date/Time    SODIUM 131 (L) 01/08/2020 02:18 AM    POTASSIUM 3.9 01/08/2020 02:18 AM    CHLORIDE 100 01/08/2020 02:18 AM    CO2 21 01/08/2020 02:18 AM    GLUCOSE 217 (H) 01/08/2020 02:18 AM    BUN 12 01/08/2020 02:18 AM    CREATININE 0.87 01/08/2020 02:18 AM     Lab Results   Component Value Date/Time    ALKPHOSPHAT 74 01/07/2020 01:17 PM    ASTSGOT 17 01/07/2020 01:17 PM    ALTSGPT 13 01/07/2020 01:17 PM    TBILIRUBIN 0.6 01/07/2020 01:17 PM      Lab Results   Component Value Date/Time    BNPBTYPENAT 98 02/13/2019 08:24 AM       Patient Active Problem List    Diagnosis Date Noted   • Other chest pain " 11/03/2018     Priority: High   • Suicidal ideation 11/05/2018     Priority: Medium   • Acute pancreatitis 11/03/2018     Priority: Medium   • Tobacco abuse 02/13/2019     Priority: Low   • Left hip pain 02/17/2019   • STEMI (ST elevation myocardial infarction) ([facility]) 12/28/2018   • Type 2 diabetes mellitus with hyperglycemia, without long-term current use of insulin ([facility]) 11/08/2018   • Psychiatric disorder 11/08/2018   • H/O heart artery stent 11/05/2018   • Homelessness 11/05/2018   • Hyponatremia 11/03/2018   • CAD (coronary artery disease) 11/03/2018   • Hypertension 11/03/2018   • PTSD (post-traumatic stress disorder) 11/03/2018       ASSESSMENT & PLAN:    Chest pain  Coronary artery disease ( s/p 4 stents, 2 stents in 06/2017 at [facility] in circumflex, 1 stent in 08/2018 at [facility] for in-stent thrombosis, 1 stent 12/2018 in RCA at [facility])    Troponin, EKG, stress test negative for any acute changes.  Would suspect her symptoms are most likely due to either spasms versus anxiety.  Continue home medication (metoprolol, lisinopril, rosuvastatin, aspirin, clopidogrel) including nitroglycerin as needed.  He had his stent placed 12/2018, states he has been compliant with DAPT for the year until 3 weeks ago.  Due to history of significant coronary artery disease would continue dual antiplatelet therapy for now and reevaluate in 1 month on his back on medication.  No need for any acute cardiac interventions.  Outpatient cardiology appointment in 1 month scheduled.    Bifascicular block  No acute EKG changes    Type 2 diabetes mellitus  On metformin 1000 mg twice daily, has not taken it for the past few weeks, A1c 8.2%.  Should be optimized, reevaluate as outpatient once he is back on his medication.    Hyperlipidemia  Continue rosuvastatin, labs in 1 month since restarting.    Hypertension  Continue metoprolol, lisinopril, amlodipine    Suicidal ideation  Depression  PTSD  Psych managing    Thank you for  allowing us to participate in the care of this patient, will sign off.

## 2020-01-08 NOTE — PROGRESS NOTES
Report received from Tashia ESTRADA. Pt care assumed. Assessment performed. Pt AOx4. Pt laying supine in bed. Pt denies pain and no signs of distress. Bed in low, locked position. Pt educated on calling to ambulate. Call light within reach. Treaded socks on pt.  Hourly rounding in place. 1:1 Sitter at bedside

## 2020-01-08 NOTE — PROGRESS NOTES
Report received. Pt care assumed. Pt up to floor from ED with ACLS RN. Monitor on pt. Verified with monitors, pt ST, /99, otherwise VSS. Medicating per MAR. Pt laying supine in bed. Pt c/o 3/10 R hip pain and no signs of distress. Pt declines medication at this time. Bed in low, locked position. Treaded socks on pt. Sitter at bedside.

## 2020-01-08 NOTE — ED NOTES
Pt transported to T8.  Bedside report given.  All belongings, 3 bags from locker, provided to transport RN.  meds in pharmacy, tag in chart.

## 2020-01-09 VITALS
HEART RATE: 64 BPM | RESPIRATION RATE: 16 BRPM | WEIGHT: 123.9 LBS | TEMPERATURE: 97.4 F | SYSTOLIC BLOOD PRESSURE: 97 MMHG | BODY MASS INDEX: 21.15 KG/M2 | HEIGHT: 64 IN | OXYGEN SATURATION: 98 % | DIASTOLIC BLOOD PRESSURE: 64 MMHG

## 2020-01-09 PROBLEM — R07.89 OTHER CHEST PAIN: Status: RESOLVED | Noted: 2018-11-03 | Resolved: 2020-01-09

## 2020-01-09 LAB
ANION GAP SERPL CALC-SCNC: 11 MMOL/L (ref 0–11.9)
BUN SERPL-MCNC: 15 MG/DL (ref 8–22)
CALCIUM SERPL-MCNC: 8.8 MG/DL (ref 8.5–10.5)
CHLORIDE SERPL-SCNC: 100 MMOL/L (ref 96–112)
CO2 SERPL-SCNC: 22 MMOL/L (ref 20–33)
CREAT SERPL-MCNC: 1.21 MG/DL (ref 0.5–1.4)
ERYTHROCYTE [DISTWIDTH] IN BLOOD BY AUTOMATED COUNT: 49.5 FL (ref 35.9–50)
GLUCOSE BLD-MCNC: 168 MG/DL (ref 65–99)
GLUCOSE BLD-MCNC: 181 MG/DL (ref 65–99)
GLUCOSE SERPL-MCNC: 170 MG/DL (ref 65–99)
HCT VFR BLD AUTO: 44.8 % (ref 42–52)
HGB BLD-MCNC: 14.9 G/DL (ref 14–18)
MCH RBC QN AUTO: 31.2 PG (ref 27–33)
MCHC RBC AUTO-ENTMCNC: 33.3 G/DL (ref 33.7–35.3)
MCV RBC AUTO: 93.7 FL (ref 81.4–97.8)
PLATELET # BLD AUTO: 355 K/UL (ref 164–446)
PMV BLD AUTO: 8.9 FL (ref 9–12.9)
POTASSIUM SERPL-SCNC: 3.8 MMOL/L (ref 3.6–5.5)
RBC # BLD AUTO: 4.78 M/UL (ref 4.7–6.1)
SODIUM SERPL-SCNC: 133 MMOL/L (ref 135–145)
WBC # BLD AUTO: 7.6 K/UL (ref 4.8–10.8)

## 2020-01-09 PROCEDURE — A9270 NON-COVERED ITEM OR SERVICE: HCPCS | Performed by: HOSPITALIST

## 2020-01-09 PROCEDURE — 36415 COLL VENOUS BLD VENIPUNCTURE: CPT

## 2020-01-09 PROCEDURE — 80048 BASIC METABOLIC PNL TOTAL CA: CPT

## 2020-01-09 PROCEDURE — G0378 HOSPITAL OBSERVATION PER HR: HCPCS

## 2020-01-09 PROCEDURE — 99217 PR OBSERVATION CARE DISCHARGE: CPT | Performed by: INTERNAL MEDICINE

## 2020-01-09 PROCEDURE — 700102 HCHG RX REV CODE 250 W/ 637 OVERRIDE(OP): Performed by: PSYCHIATRY & NEUROLOGY

## 2020-01-09 PROCEDURE — A9270 NON-COVERED ITEM OR SERVICE: HCPCS | Performed by: PSYCHIATRY & NEUROLOGY

## 2020-01-09 PROCEDURE — 700102 HCHG RX REV CODE 250 W/ 637 OVERRIDE(OP): Performed by: HOSPITALIST

## 2020-01-09 PROCEDURE — 85027 COMPLETE CBC AUTOMATED: CPT

## 2020-01-09 PROCEDURE — 96372 THER/PROPH/DIAG INJ SC/IM: CPT

## 2020-01-09 PROCEDURE — 82962 GLUCOSE BLOOD TEST: CPT | Mod: 91

## 2020-01-09 PROCEDURE — 700111 HCHG RX REV CODE 636 W/ 250 OVERRIDE (IP): Performed by: INTERNAL MEDICINE

## 2020-01-09 RX ORDER — DIVALPROEX SODIUM 500 MG/1
500 TABLET, EXTENDED RELEASE ORAL DAILY
Qty: 30 TAB
Start: 2020-01-09

## 2020-01-09 RX ORDER — LORAZEPAM 1 MG/1
1 TABLET ORAL 3 TIMES DAILY
Qty: 30 TAB | Refills: 0
Start: 2020-01-09 | End: 2020-01-12

## 2020-01-09 RX ADMIN — INSULIN HUMAN 2 UNITS: 100 INJECTION, SOLUTION PARENTERAL at 05:39

## 2020-01-09 RX ADMIN — AMLODIPINE BESYLATE 10 MG: 10 TABLET ORAL at 05:44

## 2020-01-09 RX ADMIN — ENOXAPARIN SODIUM 40 MG: 100 INJECTION SUBCUTANEOUS at 05:37

## 2020-01-09 RX ADMIN — METOPROLOL TARTRATE 25 MG: 25 TABLET, FILM COATED ORAL at 05:44

## 2020-01-09 RX ADMIN — OXYCODONE HYDROCHLORIDE 5 MG: 5 TABLET ORAL at 07:47

## 2020-01-09 RX ADMIN — INSULIN HUMAN 2 UNITS: 100 INJECTION, SOLUTION PARENTERAL at 12:18

## 2020-01-09 RX ADMIN — OMEPRAZOLE 20 MG: 20 CAPSULE, DELAYED RELEASE ORAL at 05:36

## 2020-01-09 RX ADMIN — ASPIRIN 81 MG: 81 TABLET, COATED ORAL at 05:36

## 2020-01-09 RX ADMIN — LISINOPRIL 40 MG: 20 TABLET ORAL at 05:44

## 2020-01-09 RX ADMIN — CLOPIDOGREL BISULFATE 75 MG: 75 TABLET ORAL at 05:36

## 2020-01-09 RX ADMIN — LORAZEPAM 1 MG: 1 TABLET ORAL at 05:36

## 2020-01-09 RX ADMIN — LORAZEPAM 1 MG: 1 TABLET ORAL at 12:24

## 2020-01-09 NOTE — DISCHARGE PLANNING
Received Legal Hold at 1027  Agency/Facility Name: [facility], [facility]  Referral sent per Legal Hold @ 0990

## 2020-01-09 NOTE — DISCHARGE PLANNING
Agency/Facility Name: [facility]  Spoke To: [facility]  Outcome: Transporation set up via [facility] at 1400 going to [place].    Bedside Nurse and RNCM Michelle notified.

## 2020-01-09 NOTE — DISCHARGE PLANNING
Agency/Facility Name: [facility]  Spoke To: [place]  Outcome: Patient accepted. Accepting physician is Dr. Kerry Mckeon.

## 2020-01-09 NOTE — DISCHARGE PLANNING
Medical clearance checklist, medical clearance signed by MD and legal hold paperwork faxed to [facility].

## 2020-01-09 NOTE — DISCHARGE PLANNING
Filed petition to the court via [employer]. Waiting on verified petition.    Received verified petition from the court. Sent copy to NATALIE Martinez.
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
## 2020-01-09 NOTE — DISCHARGE SUMMARY
Discharge Summary    CHIEF COMPLAINT ON ADMISSION  Chief Complaint   Patient presents with   • Suicidal Ideation   • Chest Pain       Reason for Admission    Chest pain and suicidal ideation    Admission Date  1/7/2020    CODE STATUS  Full Code    HPI & HOSPITAL COURSE    59-year-old male with past medical history of coronary artery disease, PTSD and bipolar disorder presented to the hospital on January 7, 2020 with complaint of chest pain and suicidal ideation.  Due to his history of coronary disease nuclear medicine stress test was ordered and serial troponins remain negative.  I discussed finding on nuclear medicine stress test with cardiology and requested consult.  They recommended outpatient follow-up and no further intervention required at this time.  He continued to have suicidal ideation.  Today I evaluated and examined him at the bedside.  He denies any symptoms of chest pain or shortness of breath.  He expressed that he continued to have off and on symptoms of suicidal ideation.  Plan is to discharge him to inpatient psych facility due to ongoing suicidal ideation.  Currently he is medically stable to transfer to inpatient psych facility.    Therefore, he is discharged in fair and stable condition to a psychiatric hospital.      Discharge Date  01/09/20      FOLLOW UP ITEMS POST DISCHARGE  inpatient psychiatry recommendation    DISCHARGE DIAGNOSES  Active Problems:    Suicidal ideation POA: Yes    Hyponatremia POA: Unknown    Hypertension POA: Yes    PTSD (post-traumatic stress disorder) POA: Yes    Type 2 diabetes mellitus with hyperglycemia, without long-term current use of insulin ([facility]) POA: Yes  Resolved Problems:    Other chest pain POA: Unknown      FOLLOW UP    No follow-up provider specified.    MEDICATIONS ON DISCHARGE     Medication List      START taking these medications      Instructions   divalproex  MG Tb24  Commonly known as:  DEPAKOTE ER   Take 1 Tab by mouth every day.  Dose:  500  mg     LORazepam 1 MG Tabs  Commonly known as:  ATIVAN   Take 1 Tab by mouth 3 times a day for 3 days.  Dose:  1 mg        CONTINUE taking these medications      Instructions   amLODIPine 10 MG Tabs  Commonly known as:  NORVASC   Take 1 Tab by mouth every day.  Dose:  10 mg     aspirin EC 81 MG Tbec  Commonly known as:  ECOTRIN   Take 1 Tab by mouth every day.  Dose:  81 mg     atorvastatin 80 MG tablet  Commonly known as:  LIPITOR   Take 1 Tab by mouth every evening.  Dose:  80 mg     clopidogrel 75 MG Tabs  Commonly known as:  PLAVIX   Take 75 mg by mouth every day.  Dose:  75 mg     lisinopril 40 MG tablet  Commonly known as:  PRINIVIL   Take 40 mg by mouth every day.  Dose:  40 mg     metformin 1000 MG tablet  Commonly known as:  GLUCOPHAGE   Take 1,000 mg by mouth 2 times a day, with meals.  Dose:  1,000 mg     metoprolol 25 MG Tabs  Commonly known as:  LOPRESSOR   Take 25 mg by mouth 2 times a day.  Dose:  25 mg     omeprazole 20 MG delayed-release capsule  Commonly known as:  PRILOSEC   Take 20 mg by mouth every day.  Dose:  20 mg     prazosin 2 MG Caps  Commonly known as:  MINIPRESS   Take 2 mg by mouth every evening.  Dose:  2 mg     Vitamin B Complex Tabs   Take 1 Tab by mouth every day.  Dose:  1 Tab        STOP taking these medications    buPROPion 300 MG XL tablet  Commonly known as:  WELLBUTRIN XL     hydrOXYzine pamoate 100 MG Caps  Commonly known as:  VISTARIL     QUEtiapine 100 MG Tabs  Commonly known as:  SEROQUEL     QUEtiapine 200 MG Tabs  Commonly known as:  SEROQUEL            Allergies  Allergies   Allergen Reactions   • Pcn [Penicillins] Rash     Rash       DIET  Orders Placed This Encounter   Procedures   • Diet Order Diabetic (No Sharps), Cardiac     Paperware only on meal tray.     Standing Status:   Standing     Number of Occurrences:   1     Order Specific Question:   Diet:     Answer:   Diabetic [3]     Comments:   No Sharps     Order Specific Question:   Diet:     Answer:   Cardiac [6]        ACTIVITY  As tolerated.  Weight bearing as tolerated    CONSULTATIONS  Cardiology  Psychiatry    PROCEDURES  None    LABORATORY  Lab Results   Component Value Date    SODIUM 133 (L) 01/09/2020    POTASSIUM 3.8 01/09/2020    CHLORIDE 100 01/09/2020    CO2 22 01/09/2020    GLUCOSE 170 (H) 01/09/2020    BUN 15 01/09/2020    CREATININE 1.21 01/09/2020        Lab Results   Component Value Date    WBC 7.6 01/09/2020    HEMOGLOBIN 14.9 01/09/2020    HEMATOCRIT 44.8 01/09/2020    PLATELETCT 355 01/09/2020        Total time of the discharge process exceeds 32 minutes.

## 2020-01-09 NOTE — PROGRESS NOTES
Assumed care of pt, beside report received from NATALIE Lao. Updated on POC, call light within reach all fall precautions within place. Bed locked and lowered. Pt instructed to call for assistance before getting up. All questions answered, no other needs at this time. Sitter at bedside.

## 2020-01-09 NOTE — PROGRESS NOTES
Patient being discharged to [facility]. [facility] here to transport patient. Discharge instructions given to patient and [facility]. Questions answered. IV out. Report called to Shruthi at [facility].

## 2020-01-09 NOTE — DISCHARGE INSTRUCTIONS
Discharge Instructions    Discharged to other by medical transportation with escort. Discharged via wheelchair, hospital escort: Yes.  Special equipment needed: Not Applicable    Be sure to schedule a follow-up appointment with your primary care doctor or any specialists as instructed.     Discharge Plan:   Diet Plan: Discussed  Activity Level: Discussed  Confirmed Follow up Appointment: Patient to Call and Schedule Appointment  Confirmed Symptoms Management: Discussed  Medication Reconciliation Updated: Yes  Influenza Vaccine Indication: Not indicated: Previously immunized this influenza season and > 8 years of age    I understand that a diet low in cholesterol, fat, and sodium is recommended for good health. Unless I have been given specific instructions below for another diet, I accept this instruction as my diet prescription.   Other diet: diabetic/cardiac diet    Special Instructions: None    · Is patient discharged on Warfarin / Coumadin?   No     Depression / Suicide Risk    As you are discharged from this [facility] facility, it is important to learn how to keep safe from harming yourself.    Recognize the warning signs:  · Abrupt changes in personality, positive or negative- including increase in energy   · Giving away possessions  · Change in eating patterns- significant weight changes-  positive or negative  · Change in sleeping patterns- unable to sleep or sleeping all the time   · Unwillingness or inability to communicate  · Depression  · Unusual sadness, discouragement and loneliness  · Talk of wanting to die  · Neglect of personal appearance   · Rebelliousness- reckless behavior  · Withdrawal from people/activities they love  · Confusion- inability to concentrate     If you or a loved one observes any of these behaviors or has concerns about self-harm, here's what you can do:  · Talk about it- your feelings and reasons for harming yourself  · Remove any means that you might use to hurt yourself  (examples: pills, rope, extension cords, firearm)  · Get professional help from the community (Mental Health, Substance Abuse, psychological counseling)  · Do not be alone:Call your Safe Contact- someone whom you trust who will be there for you.  · Call your local CRISIS HOTLINE 095-0784 or 929-679-0074  · Call your local Children's Mobile Crisis Response Team [place] (776) 989-4501 or www.[employer]  · Call the toll free National Suicide Prevention Hotlines   · National Suicide Prevention Lifeline 667-338-YNNO (4662)  · National Hope Line Network 800-SUICIDE (088-5291)

## 2020-01-09 NOTE — DISCHARGE PLANNING
1230-RN CM let BS RN, Barbara, know of patient discharging to [facility] at 1400 today via [facility].    1300- RN CM placed completed cobra and transfer packet with patient's chart.

## 2020-01-09 NOTE — CARE PLAN
Problem: Communication  Goal: The ability to communicate needs accurately and effectively will improve  Outcome: PROGRESSING AS EXPECTED  Intervention: Nipomo patient and significant other/support system to call light to alert staff of needs  Flowsheets (Taken 1/8/2020 0248 by Mago Malone R.N.)  Oriented to:: All of the Following : Location of Bathroom, Visiting Policy, Unit Routine, Call Light and Bedside Controls, Bedside Rail Policy, Smoking Policy, Rights and Responsibilities, Bedside Report, and Patient Education Notebook  Note:   Pt educated on POC and medications. Pt verbalized understanding.      Problem: Infection  Goal: Will remain free from infection  Outcome: PROGRESSING AS EXPECTED  Intervention: Assess signs and symptoms of infection  Note:   Assessed pt for signs and symptoms of infection hand hygiene performed before and after pt encounter.

## 2020-01-09 NOTE — DISCHARGE PLANNING
Anticipated Discharge Disposition: inpatient psych facility    Action: NATALIE SEVILLA obtained Dr. Good's signature on the certification for legal hold for 1/9/2020 at 0750.  Medical clearance also signed at that time. Legal hold paperwork faxed to Nydia at [phone].    Barriers to Discharge: placement at inpatient psych    Plan: Case coordination available for discharge needs

## 2020-03-27 ENCOUNTER — HOSPITAL ENCOUNTER (EMERGENCY)
Facility: MEDICAL CENTER | Age: 60
End: 2020-03-27
Attending: EMERGENCY MEDICINE
Payer: MEDICARE

## 2020-03-27 ENCOUNTER — APPOINTMENT (OUTPATIENT)
Dept: RADIOLOGY | Facility: MEDICAL CENTER | Age: 60
End: 2020-03-27
Attending: EMERGENCY MEDICINE
Payer: MEDICARE

## 2020-03-27 VITALS
HEART RATE: 95 BPM | DIASTOLIC BLOOD PRESSURE: 81 MMHG | RESPIRATION RATE: 15 BRPM | OXYGEN SATURATION: 95 % | SYSTOLIC BLOOD PRESSURE: 131 MMHG

## 2020-03-27 DIAGNOSIS — R07.9 CHEST PAIN, UNSPECIFIED TYPE: ICD-10-CM

## 2020-03-27 LAB
ALBUMIN SERPL BCP-MCNC: 4.6 G/DL (ref 3.2–4.9)
ALBUMIN/GLOB SERPL: 1.6 G/DL
ALP SERPL-CCNC: 97 U/L (ref 30–99)
ALT SERPL-CCNC: 23 U/L (ref 2–50)
ANION GAP SERPL CALC-SCNC: 15 MMOL/L (ref 7–16)
AST SERPL-CCNC: 14 U/L (ref 12–45)
BASOPHILS # BLD AUTO: 1.3 % (ref 0–1.8)
BASOPHILS # BLD: 0.11 K/UL (ref 0–0.12)
BILIRUB SERPL-MCNC: 0.2 MG/DL (ref 0.1–1.5)
BUN SERPL-MCNC: 13 MG/DL (ref 8–22)
CALCIUM SERPL-MCNC: 9.2 MG/DL (ref 8.5–10.5)
CHLORIDE SERPL-SCNC: 102 MMOL/L (ref 96–112)
CO2 SERPL-SCNC: 20 MMOL/L (ref 20–33)
CREAT SERPL-MCNC: 1.25 MG/DL (ref 0.5–1.4)
D DIMER PPP IA.FEU-MCNC: 0.3 UG/ML (FEU) (ref 0–0.5)
EOSINOPHIL # BLD AUTO: 0.42 K/UL (ref 0–0.51)
EOSINOPHIL NFR BLD: 5.1 % (ref 0–6.9)
ERYTHROCYTE [DISTWIDTH] IN BLOOD BY AUTOMATED COUNT: 48.8 FL (ref 35.9–50)
GLOBULIN SER CALC-MCNC: 2.9 G/DL (ref 1.9–3.5)
GLUCOSE SERPL-MCNC: 120 MG/DL (ref 65–99)
HCT VFR BLD AUTO: 45 % (ref 42–52)
HGB BLD-MCNC: 15.3 G/DL (ref 14–18)
IMM GRANULOCYTES # BLD AUTO: 0.06 K/UL (ref 0–0.11)
IMM GRANULOCYTES NFR BLD AUTO: 0.7 % (ref 0–0.9)
LYMPHOCYTES # BLD AUTO: 2.49 K/UL (ref 1–4.8)
LYMPHOCYTES NFR BLD: 30 % (ref 22–41)
MCH RBC QN AUTO: 31.2 PG (ref 27–33)
MCHC RBC AUTO-ENTMCNC: 34 G/DL (ref 33.7–35.3)
MCV RBC AUTO: 91.8 FL (ref 81.4–97.8)
MONOCYTES # BLD AUTO: 0.55 K/UL (ref 0–0.85)
MONOCYTES NFR BLD AUTO: 6.6 % (ref 0–13.4)
NEUTROPHILS # BLD AUTO: 4.66 K/UL (ref 1.82–7.42)
NEUTROPHILS NFR BLD: 56.3 % (ref 44–72)
NRBC # BLD AUTO: 0 K/UL
NRBC BLD-RTO: 0 /100 WBC
PLATELET # BLD AUTO: 435 K/UL (ref 164–446)
PMV BLD AUTO: 9.2 FL (ref 9–12.9)
POTASSIUM SERPL-SCNC: 4 MMOL/L (ref 3.6–5.5)
PROT SERPL-MCNC: 7.5 G/DL (ref 6–8.2)
RBC # BLD AUTO: 4.9 M/UL (ref 4.7–6.1)
SODIUM SERPL-SCNC: 137 MMOL/L (ref 135–145)
TROPONIN T SERPL-MCNC: 10 NG/L (ref 6–19)
TROPONIN T SERPL-MCNC: 13 NG/L (ref 6–19)
WBC # BLD AUTO: 8.3 K/UL (ref 4.8–10.8)

## 2020-03-27 PROCEDURE — 700111 HCHG RX REV CODE 636 W/ 250 OVERRIDE (IP): Performed by: EMERGENCY MEDICINE

## 2020-03-27 PROCEDURE — 85025 COMPLETE CBC W/AUTO DIFF WBC: CPT

## 2020-03-27 PROCEDURE — 36415 COLL VENOUS BLD VENIPUNCTURE: CPT

## 2020-03-27 PROCEDURE — 71045 X-RAY EXAM CHEST 1 VIEW: CPT

## 2020-03-27 PROCEDURE — 85379 FIBRIN DEGRADATION QUANT: CPT

## 2020-03-27 PROCEDURE — 99285 EMERGENCY DEPT VISIT HI MDM: CPT

## 2020-03-27 PROCEDURE — 93005 ELECTROCARDIOGRAM TRACING: CPT | Performed by: EMERGENCY MEDICINE

## 2020-03-27 PROCEDURE — 80053 COMPREHEN METABOLIC PANEL: CPT

## 2020-03-27 PROCEDURE — 96374 THER/PROPH/DIAG INJ IV PUSH: CPT

## 2020-03-27 PROCEDURE — 96375 TX/PRO/DX INJ NEW DRUG ADDON: CPT

## 2020-03-27 PROCEDURE — 84484 ASSAY OF TROPONIN QUANT: CPT

## 2020-03-27 RX ORDER — MORPHINE SULFATE 4 MG/ML
4 INJECTION, SOLUTION INTRAMUSCULAR; INTRAVENOUS ONCE
Status: COMPLETED | OUTPATIENT
Start: 2020-03-27 | End: 2020-03-27

## 2020-03-27 RX ORDER — ONDANSETRON 2 MG/ML
4 INJECTION INTRAMUSCULAR; INTRAVENOUS ONCE
Status: COMPLETED | OUTPATIENT
Start: 2020-03-27 | End: 2020-03-27

## 2020-03-27 RX ADMIN — ONDANSETRON 4 MG: 2 INJECTION INTRAMUSCULAR; INTRAVENOUS at 12:36

## 2020-03-27 RX ADMIN — MORPHINE SULFATE 4 MG: 4 INJECTION INTRAVENOUS at 12:36

## 2020-03-27 ASSESSMENT — LIFESTYLE VARIABLES: DO YOU DRINK ALCOHOL: NO

## 2020-03-27 NOTE — ED PROVIDER NOTES
"ED Provider Note    Scribed for Chema Booker M.D. by Alisa Pizano. 3/27/2020, 12:21 PM.    Primary care provider: Pcp Pt States None  Means of arrival: Walt  History obtained from: Patient  History limited by: None    CHIEF COMPLAINT  Chief Complaint   Patient presents with   • Chest Pain       HPI  Rajiv Sam is a 59 y.o. male who presents to the Emergency Department via ambulance for evaluation of midsternal chest pain with associated shortness of breath onset yesterday morning. En route to the ED, he received one full aspirin and nitro by paramedics. The patient describes chest pain as \"feels like a cardiac event.\" He rates the pain as 10/10 in severity. The patient states he has had chest pain in the past, but they do not usually present with shortness of breath, like his current episode. He states pain is exacerbated with deep inspirations. He denies history of COPD or asthma. He denies any history of gallbladder problems. Denies any cough, nausea, or diaphoresis.     REVIEW OF SYSTEMS  Pertinent positives include chest pain and shortness of breath. Pertinent negatives include no cough, nausea, or diaphoresis. .  All other systems reviewed and negative.    PAST MEDICAL HISTORY   has a past medical history of Hypertension, Old MI (myocardial infarction), Psychiatric disorder, and PTSD (post-traumatic stress disorder).    SURGICAL HISTORY   has a past surgical history that includes cardiac cath, left heart.    SOCIAL HISTORY  Social History     Tobacco Use   • Smoking status: Current Every Day Smoker   • Smokeless tobacco: Never Used   Substance Use Topics   • Alcohol use: No   • Drug use: Yes     Types: Inhaled     Comment: pot      Social History     Substance and Sexual Activity   Drug Use Yes   • Types: Inhaled    Comment: pot       FAMILY HISTORY  Family History   Problem Relation Age of Onset   • Cancer Mother        CURRENT MEDICATIONS  Home Medications     Reviewed by Bel Batres (Pharmacy " Tech) on 03/27/20 at 1252  Med List Status: Complete   Medication Last Dose Status   amLODIPine (NORVASC) 10 MG Tab UNK Active   aspirin EC (ECOTRIN) 81 MG Tablet Delayed Response UNK Active   atorvastatin (LIPITOR) 80 MG tablet UNK Active   B Complex Vitamins (VITAMIN B COMPLEX) Tab UNK Active   clopidogrel (PLAVIX) 75 MG Tab UNK Active   divalproex ER (DEPAKOTE ER) 500 MG TABLET SR 24 HR UNK Active   lisinopril (PRINIVIL, ZESTRIL) 40 MG tablet UNK Active   metformin (GLUCOPHAGE) 1000 MG tablet UNK Active   metoprolol (LOPRESSOR) 25 MG Tab UNK Active   omeprazole (PRILOSEC) 20 MG delayed-release capsule UNK Active   prazosin (MINIPRESS) 2 MG Cap UNK Active                ALLERGIES  Allergies   Allergen Reactions   • Pcn [Penicillins] Rash     Rash       PHYSICAL EXAM  VITAL SIGNS: /81   Pulse 95   Resp 15   SpO2 95%     Constitutional: Well developed, Well nourished, Non-toxic appearance.   HENT: Normocephalic, Atraumatic, TMs normal, mucous membranes moist, no erythema, exudates, swelling, or masses, nares patent  Eyes: nonicteric  Neck: Supple, no meningismus  Lymphatic: No lymphadenopathy noted.   Cardiovascular: Heart score: 3. Regular rate and rhythm, no gallops rubs or murmurs  Lungs: Clear bilaterally, slightly Tachypneic  Abdomen: Bowel sounds normal, Soft, No tenderness  Skin: Warm, Dry, no rash  Back: No tenderness, No CVA tenderness.   Genitalia: Deferred  Rectal: Deferred  Extremities: No edema  Neurologic: Alert, appropriate, follows commands, moving all extremities, normal speech   Psychiatric: Anxious appearing,       DIAGNOSTIC STUDIES / PROCEDURES    LABS  Results for orders placed or performed during the hospital encounter of 03/27/20   CBC with Differential   Result Value Ref Range    WBC 8.3 4.8 - 10.8 K/uL    RBC 4.90 4.70 - 6.10 M/uL    Hemoglobin 15.3 14.0 - 18.0 g/dL    Hematocrit 45.0 42.0 - 52.0 %    MCV 91.8 81.4 - 97.8 fL    MCH 31.2 27.0 - 33.0 pg    MCHC 34.0 33.7 - 35.3 g/dL     RDW 48.8 35.9 - 50.0 fL    Platelet Count 435 164 - 446 K/uL    MPV 9.2 9.0 - 12.9 fL    Neutrophils-Polys 56.30 44.00 - 72.00 %    Lymphocytes 30.00 22.00 - 41.00 %    Monocytes 6.60 0.00 - 13.40 %    Eosinophils 5.10 0.00 - 6.90 %    Basophils 1.30 0.00 - 1.80 %    Immature Granulocytes 0.70 0.00 - 0.90 %    Nucleated RBC 0.00 /100 WBC    Neutrophils (Absolute) 4.66 1.82 - 7.42 K/uL    Lymphs (Absolute) 2.49 1.00 - 4.80 K/uL    Monos (Absolute) 0.55 0.00 - 0.85 K/uL    Eos (Absolute) 0.42 0.00 - 0.51 K/uL    Baso (Absolute) 0.11 0.00 - 0.12 K/uL    Immature Granulocytes (abs) 0.06 0.00 - 0.11 K/uL    NRBC (Absolute) 0.00 K/uL   Complete Metabolic Panel (CMP)   Result Value Ref Range    Sodium 137 135 - 145 mmol/L    Potassium 4.0 3.6 - 5.5 mmol/L    Chloride 102 96 - 112 mmol/L    Co2 20 20 - 33 mmol/L    Anion Gap 15.0 7.0 - 16.0    Glucose 120 (H) 65 - 99 mg/dL    Bun 13 8 - 22 mg/dL    Creatinine 1.25 0.50 - 1.40 mg/dL    Calcium 9.2 8.5 - 10.5 mg/dL    AST(SGOT) 14 12 - 45 U/L    ALT(SGPT) 23 2 - 50 U/L    Alkaline Phosphatase 97 30 - 99 U/L    Total Bilirubin 0.2 0.1 - 1.5 mg/dL    Albumin 4.6 3.2 - 4.9 g/dL    Total Protein 7.5 6.0 - 8.2 g/dL    Globulin 2.9 1.9 - 3.5 g/dL    A-G Ratio 1.6 g/dL   Troponin   Result Value Ref Range    Troponin T 13 6 - 19 ng/L   Troponin in two (2) hours   Result Value Ref Range    Troponin T 10 6 - 19 ng/L   D-DIMER   Result Value Ref Range    D-Dimer Screen 0.30 0.00 - 0.50 ug/mL (FEU)   ESTIMATED GFR   Result Value Ref Range    GFR If African American >60 >60 mL/min/1.73 m 2    GFR If Non African American 59 (A) >60 mL/min/1.73 m 2   EKG   Result Value Ref Range    Report       [facility] Emergency Dept.    Test Date:  2020  Pt Name:    EVERARDO JACKSON                Department: ER  MRN:        [id number]                      Room:        03  Gender:     Male                         Technician: [id number]  :        1960                    Requested By:JAYMIE BELTRAN  Order #:    [id number]                    Reading MD:    Measurements  Intervals                                Axis  Rate:       95                           P:          76  NH:         140                          QRS:        -164  QRSD:       118                          T:          41  QT:         384  QTc:        483    Interpretive Statements  SINUS RHYTHM  INCOMPLETE RIGHT BUNDLE BRANCH BLOCK  INFERIOR INFARCT, OLD  BORDERLINE R WAVE PROGRESSION, ANTERIOR LEADS  LATERAL LEADS ARE ALSO INVOLVED  Compared to ECG 01/07/2020 19:13:44  Incomplete right bundle-branch block now present  Sinus bradycardia no longer present  Left posterior fascicular block no longer present  Ri ght bundle-branch block no longer present  Myocardial infarct finding still present        All labs reviewed by me.    EKG  Obtained at 12:19  Normal Sinus rhythm   Rate 95  Incomplete right bundle   Q waves inferiorly  Delayed precordial progression  No T wave inversions.   No ST segment elevation or depression.        RADIOLOGY  DX-CHEST-PORTABLE (1 VIEW)   Final Result      No acute cardiopulmonary abnormality.        The radiologist's interpretation of all radiological studies have been reviewed by me.    COURSE & MEDICAL DECISION MAKING  Nursing notes, VS, PMSFHx reviewed in chart.     Obtained and reviewed past medical records which indicated the patient had a stress test completed in January that shows old infarct, but no reversible ischemia. The patient has history of hypertension, MI, coronary artery disease, and PTSD and SI.       12:21 PM Patient seen and examined at bedside. Discussed plan of care with patient. I informed them that labs and imaging will be ordered to evaluate symptoms. Patient is understanding and agreeable with plan.  Ordered for DX chest, Troponin every two hour, D dimer, CBC with diff, CMP, and EKG to evaluate. Patient was treated with morphine 4 mg injection, and Zofran 4 mg injection for  his symptoms.     1:11 PM - Patient was reevaluated at bedside. Discussed lab and radiology results with the patient and informed them that results were reassuring. 1st troponin was negative. Chest xray showed no acute cardiopulmonary abnormalities. Awaiting 2nd troponin.    3:19 PM - Patient was reevaluated at bedside. Discussed lab results with the patient and informed them that second troponin was negative. The patient will be discharged home in stable condition and will return to the ED for any new or worsening symptoms.       Heart Score: 3 [1 (age) + 2 (risk factors)]    Decision Making:  This is a 59 y.o. year old male who presents with atypical chest pain and a heart score of 3.  The patient has a an EKG that is nonischemic appearing as well as 2- troponins.  He had a recent stress test within the last couple of months that was remarkable for no reversible ischemia.  The patient will be discharged to follow-up with his regular doctor.  At this time he also has a negative d-dimer which is good evidence against PE as well as dissection in combination with his chest x-ray.  He does appear improved here.        The patient will return for new or worsening symptoms and is stable at the time of discharge.    The patient is referred to a primary physician for blood pressure management, diabetic screening, and for all other preventative health concerns.      DISPOSITION:  Patient will be discharged home in stable condition.    FOLLOW UP:  [address] [address]  [phone]          OUTPATIENT MEDICATIONS:  New Prescriptions    No medications on file        FINAL IMPRESSION  1. Chest pain, unspecified type          I, Alisa Pizano (Yue), am scribing for, and in the presence of, Chema Booker M.D..    Electronically signed by: Alisa Pizano (Yue), 3/27/2020    Chema ELDRIDGE M.D. personally performed the services described in this documentation, as scribed by Alisa THURSTON  Harinder in my presence, and it is both accurate and complete. C    The note accurately reflects work and decisions made by me.  Chema Booker M.D.  3/27/2020  3:22 PM

## 2020-03-27 NOTE — ED NOTES
Pt discharged home. Explained discharge instructions. Questions and concerns addressed. Pt verbalized understanding of instructions. Wristband removed. Pt advised to follow-up with pcp or return to ED for any new or worsening of symptoms. Pt is ambulating well and steady on feet. VS stable. Pt taxi will be escorting pt home.     PIV removed and dressing applied  Pt verbalized understanding of all dc instructions.

## 2020-03-27 NOTE — ED TRIAGE NOTES
Pt reports midsternal (10/10) CP that radiates to L arm . x45 minutes.   324 ASA given +.4 nitro en route    Pt speaking in full sentences, NAD noted. Pt educated of triage process, placed back in waiting area pending room assignment.

## 2020-03-27 NOTE — DISCHARGE INSTRUCTIONS
Return for new or concerning symptoms to include recurrent chest pain trouble breathing or other concerns.  Please follow-up with your regular doctor.

## 2020-03-27 NOTE — ED NOTES
Med rec completed   Allergies reviewed  No PO antibiotics in the last 14 days    Pt states that it has been about 2 to 3 weeks since he has taken his medications

## 2020-03-27 NOTE — ED NOTES
"Pt unable to verbalize if pain is reduced at this time, states \"I not sure my chest still hurts.\" pt lying in bed, appears relaxed.   "

## 2020-03-29 LAB — EKG IMPRESSION: NORMAL

## 2020-06-12 ENCOUNTER — APPOINTMENT (OUTPATIENT)
Dept: RADIOLOGY | Facility: MEDICAL CENTER | Age: 60
End: 2020-06-12
Attending: EMERGENCY MEDICINE
Payer: COMMERCIAL

## 2020-06-12 ENCOUNTER — HOSPITAL ENCOUNTER (EMERGENCY)
Facility: MEDICAL CENTER | Age: 60
End: 2020-06-12
Attending: EMERGENCY MEDICINE
Payer: COMMERCIAL

## 2020-06-12 VITALS
OXYGEN SATURATION: 98 % | TEMPERATURE: 98.5 F | HEIGHT: 64 IN | WEIGHT: 127 LBS | BODY MASS INDEX: 21.68 KG/M2 | RESPIRATION RATE: 15 BRPM | DIASTOLIC BLOOD PRESSURE: 92 MMHG | SYSTOLIC BLOOD PRESSURE: 150 MMHG | HEART RATE: 63 BPM

## 2020-06-12 DIAGNOSIS — R07.9 CHEST PAIN, UNSPECIFIED TYPE: ICD-10-CM

## 2020-06-12 LAB
ALBUMIN SERPL BCP-MCNC: 4.2 G/DL (ref 3.2–4.9)
ALBUMIN/GLOB SERPL: 1.8 G/DL
ALP SERPL-CCNC: 107 U/L (ref 30–99)
ALT SERPL-CCNC: 17 U/L (ref 2–50)
ANION GAP SERPL CALC-SCNC: 13 MMOL/L (ref 7–16)
AST SERPL-CCNC: 14 U/L (ref 12–45)
BASOPHILS # BLD AUTO: 1 % (ref 0–1.8)
BASOPHILS # BLD: 0.08 K/UL (ref 0–0.12)
BILIRUB SERPL-MCNC: 0.4 MG/DL (ref 0.1–1.5)
BUN SERPL-MCNC: 8 MG/DL (ref 8–22)
CALCIUM SERPL-MCNC: 9.1 MG/DL (ref 8.5–10.5)
CHLORIDE SERPL-SCNC: 99 MMOL/L (ref 96–112)
CO2 SERPL-SCNC: 25 MMOL/L (ref 20–33)
CREAT SERPL-MCNC: 1.1 MG/DL (ref 0.5–1.4)
EKG IMPRESSION: NORMAL
EOSINOPHIL # BLD AUTO: 0.35 K/UL (ref 0–0.51)
EOSINOPHIL NFR BLD: 4.4 % (ref 0–6.9)
ERYTHROCYTE [DISTWIDTH] IN BLOOD BY AUTOMATED COUNT: 52.9 FL (ref 35.9–50)
GLOBULIN SER CALC-MCNC: 2.4 G/DL (ref 1.9–3.5)
GLUCOSE SERPL-MCNC: 132 MG/DL (ref 65–99)
HCT VFR BLD AUTO: 41.6 % (ref 42–52)
HGB BLD-MCNC: 13.5 G/DL (ref 14–18)
IMM GRANULOCYTES # BLD AUTO: 0.07 K/UL (ref 0–0.11)
IMM GRANULOCYTES NFR BLD AUTO: 0.9 % (ref 0–0.9)
LYMPHOCYTES # BLD AUTO: 1.37 K/UL (ref 1–4.8)
LYMPHOCYTES NFR BLD: 17.3 % (ref 22–41)
MCH RBC QN AUTO: 31.8 PG (ref 27–33)
MCHC RBC AUTO-ENTMCNC: 32.5 G/DL (ref 33.7–35.3)
MCV RBC AUTO: 98.1 FL (ref 81.4–97.8)
MONOCYTES # BLD AUTO: 0.93 K/UL (ref 0–0.85)
MONOCYTES NFR BLD AUTO: 11.7 % (ref 0–13.4)
NEUTROPHILS # BLD AUTO: 5.13 K/UL (ref 1.82–7.42)
NEUTROPHILS NFR BLD: 64.7 % (ref 44–72)
NRBC # BLD AUTO: 0 K/UL
NRBC BLD-RTO: 0 /100 WBC
PLATELET # BLD AUTO: 266 K/UL (ref 164–446)
PMV BLD AUTO: 9.9 FL (ref 9–12.9)
POTASSIUM SERPL-SCNC: 3.8 MMOL/L (ref 3.6–5.5)
PROT SERPL-MCNC: 6.6 G/DL (ref 6–8.2)
RBC # BLD AUTO: 4.24 M/UL (ref 4.7–6.1)
SODIUM SERPL-SCNC: 137 MMOL/L (ref 135–145)
TROPONIN T SERPL-MCNC: 11 NG/L (ref 6–19)
TROPONIN T SERPL-MCNC: 12 NG/L (ref 6–19)
WBC # BLD AUTO: 7.9 K/UL (ref 4.8–10.8)

## 2020-06-12 PROCEDURE — 80053 COMPREHEN METABOLIC PANEL: CPT

## 2020-06-12 PROCEDURE — 93005 ELECTROCARDIOGRAM TRACING: CPT | Performed by: EMERGENCY MEDICINE

## 2020-06-12 PROCEDURE — 71045 X-RAY EXAM CHEST 1 VIEW: CPT

## 2020-06-12 PROCEDURE — 96374 THER/PROPH/DIAG INJ IV PUSH: CPT

## 2020-06-12 PROCEDURE — 700102 HCHG RX REV CODE 250 W/ 637 OVERRIDE(OP)

## 2020-06-12 PROCEDURE — 84484 ASSAY OF TROPONIN QUANT: CPT

## 2020-06-12 PROCEDURE — 99285 EMERGENCY DEPT VISIT HI MDM: CPT

## 2020-06-12 PROCEDURE — A9270 NON-COVERED ITEM OR SERVICE: HCPCS

## 2020-06-12 PROCEDURE — A9270 NON-COVERED ITEM OR SERVICE: HCPCS | Performed by: EMERGENCY MEDICINE

## 2020-06-12 PROCEDURE — 36415 COLL VENOUS BLD VENIPUNCTURE: CPT

## 2020-06-12 PROCEDURE — 700102 HCHG RX REV CODE 250 W/ 637 OVERRIDE(OP): Performed by: EMERGENCY MEDICINE

## 2020-06-12 PROCEDURE — 700111 HCHG RX REV CODE 636 W/ 250 OVERRIDE (IP): Performed by: EMERGENCY MEDICINE

## 2020-06-12 PROCEDURE — 85025 COMPLETE CBC W/AUTO DIFF WBC: CPT

## 2020-06-12 RX ORDER — NITROGLYCERIN 0.4 MG/1
0.4 TABLET SUBLINGUAL ONCE
Status: COMPLETED | OUTPATIENT
Start: 2020-06-12 | End: 2020-06-12

## 2020-06-12 RX ADMIN — FENTANYL CITRATE 50 MCG: 50 INJECTION INTRAMUSCULAR; INTRAVENOUS at 19:21

## 2020-06-12 RX ADMIN — METOPROLOL TARTRATE 25 MG: 25 TABLET, FILM COATED ORAL at 19:20

## 2020-06-12 RX ADMIN — NITROGLYCERIN 0.4 MG: 0.4 TABLET, ORALLY DISINTEGRATING SUBLINGUAL at 22:13

## 2020-06-12 ASSESSMENT — FIBROSIS 4 INDEX: FIB4 SCORE: 0.39

## 2020-06-12 ASSESSMENT — ENCOUNTER SYMPTOMS
VOMITING: 0
COUGH: 0
HEADACHES: 0
FEVER: 0
CHILLS: 0
ABDOMINAL PAIN: 0
SHORTNESS OF BREATH: 0
NAUSEA: 0

## 2020-06-13 NOTE — ED PROVIDER NOTES
ED Provider Note    Scribed for Anh Fernandes M.D. by Omar Laguna. 6/12/2020, 6:47 PM.    Means of arrival: EMS  History obtained from: Patient  History limited by: None    CHIEF COMPLAINT  Chief Complaint   Patient presents with   • Chest Pain     Since 1600 today. Sternal, non-radiating, 8/10 currently.  Hx 7 MIs with 4 stents.        HPI  Rajiv Sam is a 59 y.o. male who presents to the Emergency Department for acute, moderate chest pain onset 3 hours ago. Patient states that his pain is located near the center of his chest and rated it as a 8/10 in severity.  He states that he has a history of prior MI with his last one being in January 2019, and reports that he has ongoing intermittent chest pain since then.  He does not regularly follow with cardiology.  He was given 324 ASA and 0.4 SL nitro by EMS which provided mild alleviation. Patient has a significant cardiac history with 7 previous MI's and 4 stents. He takes various medications as noted below, and did not take his hypertensive medication this evening but is otherwise compliant with all his medications. Patient denies any fever, chills, cough, or shortness of breath.     Review of past medical records show that the patient had a cardiac stress test on 1/8/20 which showed Slightly decreased left ventricular ejection fraction at 53% and was negative for reversible ischemia.  Of note patient is currently at [facility] for ongoing psychiatric disorder, and is being managed there for this.    REVIEW OF SYSTEMS  Review of Systems   Constitutional: Negative for chills and fever.   Respiratory: Negative for cough and shortness of breath.    Cardiovascular: Positive for chest pain.   Gastrointestinal: Negative for abdominal pain, nausea and vomiting.   Neurological: Negative for headaches.   All other systems reviewed and are negative.    PAST MEDICAL HISTORY   has a past medical history of Hypertension, Old MI (myocardial infarction), Psychiatric  "disorder, and PTSD (post-traumatic stress disorder).    SURGICAL HISTORY   has a past surgical history that includes cardiac cath, left heart.    SOCIAL HISTORY  Social History     Tobacco Use   • Smoking status: Current Every Day Smoker   • Smokeless tobacco: Never Used   Substance Use Topics   • Alcohol use: No   • Drug use: Yes     Types: Inhaled     Comment: pot      Social History     Substance and Sexual Activity   Drug Use Yes   • Types: Inhaled    Comment: pot       FAMILY HISTORY  Family History   Problem Relation Age of Onset   • Cancer Mother        CURRENT MEDICATIONS  Current Outpatient Medications:   •  divalproex ER (DEPAKOTE ER) 500 MG TABLET SR 24 HR, Take 1 Tab by mouth every day., Disp: 30 Tab, Rfl:   •  B Complex Vitamins (VITAMIN B COMPLEX) Tab, Take 1 Tab by mouth every day., Disp: , Rfl:   •  clopidogrel (PLAVIX) 75 MG Tab, Take 75 mg by mouth every day., Disp: , Rfl:   •  metformin (GLUCOPHAGE) 1000 MG tablet, Take 1,000 mg by mouth 2 times a day, with meals., Disp: , Rfl:   •  metoprolol (LOPRESSOR) 25 MG Tab, Take 25 mg by mouth 2 times a day., Disp: , Rfl:   •  omeprazole (PRILOSEC) 20 MG delayed-release capsule, Take 20 mg by mouth every day., Disp: , Rfl:   •  amLODIPine (NORVASC) 10 MG Tab, Take 1 Tab by mouth every day., Disp: 30 Tab, Rfl: 0  •  lisinopril (PRINIVIL, ZESTRIL) 40 MG tablet, Take 40 mg by mouth every day., Disp: , Rfl:   •  prazosin (MINIPRESS) 2 MG Cap, Take 2 mg by mouth every evening., Disp: , Rfl:   •  aspirin EC (ECOTRIN) 81 MG Tablet Delayed Response, Take 1 Tab by mouth every day., Disp: 30 Tab, Rfl: 2  •  atorvastatin (LIPITOR) 80 MG tablet, Take 1 Tab by mouth every evening., Disp: 30 Tab, Rfl: 2    ALLERGIES  Allergies   Allergen Reactions   • Pcn [Penicillins] Rash     Rash       PHYSICAL EXAM  VITAL SIGNS: BP (!) 174/100   Pulse 72   Temp 36.8 °C (98.2 °F) (Temporal)   Resp (!) 21   Ht 1.626 m (5' 4\")   Wt 57.6 kg (127 lb)   SpO2 99%   BMI 21.80 kg/m² "   Vitals reviewed by myself.  Physical Exam  Nursing note and vitals reviewed.  Constitutional: Well-developed and well-nourished. No acute distress.   HENT: Head is normocephalic and atraumatic.  Eyes: extra-ocular movements intact  Cardiovascular: Regular rate and regular rhythm. No murmur heard.  Pulmonary/Chest: Breath sounds normal. No wheezes or rales.   Abdominal: Soft and non-tender. No distention.    Musculoskeletal: Extremities exhibit normal range of motion without edema or tenderness.   Neurological: Awake and alert  Skin: Skin is warm and dry. No rash.       DIAGNOSTIC STUDIES  LABS  Labs Reviewed   CBC WITH DIFFERENTIAL - Abnormal; Notable for the following components:       Result Value    RBC 4.24 (*)     Hemoglobin 13.5 (*)     Hematocrit 41.6 (*)     MCV 98.1 (*)     MCHC 32.5 (*)     RDW 52.9 (*)     Lymphocytes 17.30 (*)     Monos (Absolute) 0.93 (*)     All other components within normal limits   COMP METABOLIC PANEL - Abnormal; Notable for the following components:    Glucose 132 (*)     Alkaline Phosphatase 107 (*)     All other components within normal limits   TROPONIN   ESTIMATED GFR   TROPONIN     All labs reviewed by me.    EKG Interpretation:  Interpreted by myself    12 Lead EKG interpreted by me to show:  EKG at 1859: Normal sinus rhythm, heart rate 65, right axis deviation, intervals notable for prolonged QRS of 122 with associated right bundle branch block, , QTc 433, no acute ST-T segment changes, no evidence of acute arrhythmia or ischemia, no dynamic changes when compared to prior EKG  My impression of this EKG: Does not indicate ischemia or arrhythmia at this time.     RADIOLOGY  DX-CHEST-PORTABLE (1 VIEW)   Final Result      No acute cardiac or pulmonary abnormalities are identified.        The radiologist's interpretation of all radiological studies have been reviewed by me.    REASSESSMENT    6:47 PM - Patient seen and examined at bedside. Discussed plan of care,  including ordering labs and imaging. Patient agrees to the plan of care.      8:09 PM - Patient was reevaluated at bedside. He reports feeling improved. Patient was updated on results as noted above. Will repeated troponin and reassess.     10:02 PM - Message was left with [facility]  for Cardiology follow up with patient.     10:03 PM - Patient's repeat troponin was negative. Patient was reevaluated at bedside. I encouraged him to follow up with outpatient cardiology. Return precautions were discussed with the patient, and they were cleared for discharge at this time. Patient was understanding and agreeable to discharge.    COURSE & MEDICAL DECISION MAKING  Nursing notes, VS, PMSFHx reviewed in chart.    Patient is a 59-year-old male who comes in for chest pain.  Differential diagnosis includes chronic angina, acute coronary syndrome, arrhythmia, electrolyte disturbance, costochondritis.  Diagnostic work-up includes labs, EKG and chest x-ray.    Patient's initial vitals notable for hypertension, patient is otherwise well-appearing on exam.  He is treated with his nighttime dose of metoprolol and fentanyl after which he feels improved and hypertension also improves.  EKG returns and demonstrates no acute ischemic changes.  Labs returned and are unremarkable.  Initial troponin is within normal limits, delta troponin after couple of hours is also within normal limits.  Chest x-ray demonstrates no acute cardiopulmonary processes.  Patient has a HEART score of 3 making him low risk for acute coronary syndrome, he also recently had a stress test in January 2020 which demonstrated nonreversible ischemia.  Message was left with the scheduling department to help expedite patient's follow-up with cardiology.  I advised the importance of cardiology follow-up with patient given his history, I advised him this may be chronic angina that can be well controlled with medications if he follows up appropriately.  Patient  understands and is agreeable to this plan.  He is then given strict return precautions and discharged in stable condition.    The patient will return for new or worsening symptoms and is stable at the time of discharge.    The patient is referred to a primary physician for blood pressure management, diabetic screening, and for all other preventative health concerns.      DISPOSITION:  Patient will be discharged to [facility] in stable condition.    FOLLOW UP:  Outpatient Anticoagulation Services  [address] [place] [id number] [phone]            FINAL IMPRESSION  1. Chest pain, unspecified type          I, Omar Laguna (Carolibpamela), aleena scribing for, and in the presence of, Anh Fernandes M.D..    Electronically signed by: Omar Laguna (Yue), 6/12/2020    IAnh M.D. personally performed the services described in this documentation, as scribed by Omar Laguna in my presence, and it is both accurate and complete. C.    The note accurately reflects work and decisions made by me.  Anh Fernandes M.D.  6/12/2020  11:42 PM

## 2020-06-13 NOTE — ED NOTES
Pt discharged, VSS - remains hypertensive. PIV removed and site bandaged. Discharge paperwork given. PT discharged with [facility] sitter back to [facility].

## 2020-06-13 NOTE — ED NOTES
Chief Complaint   Patient presents with   • Chest Pain     Since 1600 today. Sternal, non-radiating, 8/10 currently.  Hx 7 MIs with 4 stents.        Pt LEYDI YOU from [facility]. Pt c/o non-radiating, sternal CP since 1600 today. Pt states he was drawing when pain started. Denies fever, chills, cough, SOB, dizziness. Hx 7 MIs with 4 stent placement.  Pt voluntarily admitted to [facility] with SI and depression. Given 0.4 SL Nitro and 324 ASA by [facility].

## 2020-08-03 ENCOUNTER — APPOINTMENT (OUTPATIENT)
Dept: RADIOLOGY | Facility: MEDICAL CENTER | Age: 60
End: 2020-08-03
Attending: EMERGENCY MEDICINE
Payer: MEDICARE

## 2020-08-03 ENCOUNTER — HOSPITAL ENCOUNTER (EMERGENCY)
Facility: MEDICAL CENTER | Age: 60
End: 2020-08-04
Attending: EMERGENCY MEDICINE
Payer: MEDICARE

## 2020-08-03 VITALS
DIASTOLIC BLOOD PRESSURE: 74 MMHG | RESPIRATION RATE: 16 BRPM | WEIGHT: 127 LBS | HEART RATE: 79 BPM | OXYGEN SATURATION: 98 % | TEMPERATURE: 98.5 F | SYSTOLIC BLOOD PRESSURE: 128 MMHG | HEIGHT: 64 IN | BODY MASS INDEX: 21.68 KG/M2

## 2020-08-03 DIAGNOSIS — F10.920 ALCOHOLIC INTOXICATION WITHOUT COMPLICATION (HCC): ICD-10-CM

## 2020-08-03 DIAGNOSIS — R07.9 CHEST PAIN, UNSPECIFIED TYPE: ICD-10-CM

## 2020-08-03 LAB
ALBUMIN SERPL BCP-MCNC: 4.8 G/DL (ref 3.2–4.9)
ALBUMIN/GLOB SERPL: 1.9 G/DL
ALP SERPL-CCNC: 84 U/L (ref 30–99)
ALT SERPL-CCNC: 20 U/L (ref 2–50)
ANION GAP SERPL CALC-SCNC: 23 MMOL/L (ref 7–16)
AST SERPL-CCNC: 22 U/L (ref 12–45)
BASOPHILS # BLD AUTO: 0.9 % (ref 0–1.8)
BASOPHILS # BLD: 0.08 K/UL (ref 0–0.12)
BILIRUB SERPL-MCNC: 0.3 MG/DL (ref 0.1–1.5)
BUN SERPL-MCNC: 4 MG/DL (ref 8–22)
CALCIUM SERPL-MCNC: 8.4 MG/DL (ref 8.5–10.5)
CHLORIDE SERPL-SCNC: 94 MMOL/L (ref 96–112)
CO2 SERPL-SCNC: 15 MMOL/L (ref 20–33)
CREAT SERPL-MCNC: 0.85 MG/DL (ref 0.5–1.4)
EKG IMPRESSION: NORMAL
EOSINOPHIL # BLD AUTO: 0.5 K/UL (ref 0–0.51)
EOSINOPHIL NFR BLD: 5.7 % (ref 0–6.9)
ERYTHROCYTE [DISTWIDTH] IN BLOOD BY AUTOMATED COUNT: 46.5 FL (ref 35.9–50)
ETHANOL BLD-MCNC: 322.6 MG/DL (ref 0–10.1)
GLOBULIN SER CALC-MCNC: 2.5 G/DL (ref 1.9–3.5)
GLUCOSE SERPL-MCNC: 145 MG/DL (ref 65–99)
HCT VFR BLD AUTO: 40 % (ref 42–52)
HGB BLD-MCNC: 13.8 G/DL (ref 14–18)
IMM GRANULOCYTES # BLD AUTO: 0.04 K/UL (ref 0–0.11)
IMM GRANULOCYTES NFR BLD AUTO: 0.5 % (ref 0–0.9)
LYMPHOCYTES # BLD AUTO: 1.94 K/UL (ref 1–4.8)
LYMPHOCYTES NFR BLD: 22.3 % (ref 22–41)
MCH RBC QN AUTO: 31.3 PG (ref 27–33)
MCHC RBC AUTO-ENTMCNC: 34.5 G/DL (ref 33.7–35.3)
MCV RBC AUTO: 90.7 FL (ref 81.4–97.8)
MONOCYTES # BLD AUTO: 0.64 K/UL (ref 0–0.85)
MONOCYTES NFR BLD AUTO: 7.3 % (ref 0–13.4)
NEUTROPHILS # BLD AUTO: 5.51 K/UL (ref 1.82–7.42)
NEUTROPHILS NFR BLD: 63.3 % (ref 44–72)
NRBC # BLD AUTO: 0 K/UL
NRBC BLD-RTO: 0 /100 WBC
PLATELET # BLD AUTO: 385 K/UL (ref 164–446)
PMV BLD AUTO: 8.8 FL (ref 9–12.9)
POTASSIUM SERPL-SCNC: 3.5 MMOL/L (ref 3.6–5.5)
PROT SERPL-MCNC: 7.3 G/DL (ref 6–8.2)
RBC # BLD AUTO: 4.41 M/UL (ref 4.7–6.1)
SODIUM SERPL-SCNC: 132 MMOL/L (ref 135–145)
TROPONIN T SERPL-MCNC: 7 NG/L (ref 6–19)
WBC # BLD AUTO: 8.7 K/UL (ref 4.8–10.8)

## 2020-08-03 PROCEDURE — 80053 COMPREHEN METABOLIC PANEL: CPT

## 2020-08-03 PROCEDURE — A9270 NON-COVERED ITEM OR SERVICE: HCPCS | Performed by: EMERGENCY MEDICINE

## 2020-08-03 PROCEDURE — 700102 HCHG RX REV CODE 250 W/ 637 OVERRIDE(OP): Performed by: EMERGENCY MEDICINE

## 2020-08-03 PROCEDURE — 84484 ASSAY OF TROPONIN QUANT: CPT

## 2020-08-03 PROCEDURE — 96365 THER/PROPH/DIAG IV INF INIT: CPT

## 2020-08-03 PROCEDURE — 71045 X-RAY EXAM CHEST 1 VIEW: CPT

## 2020-08-03 PROCEDURE — 93005 ELECTROCARDIOGRAM TRACING: CPT | Performed by: EMERGENCY MEDICINE

## 2020-08-03 PROCEDURE — 700101 HCHG RX REV CODE 250: Performed by: EMERGENCY MEDICINE

## 2020-08-03 PROCEDURE — 85025 COMPLETE CBC W/AUTO DIFF WBC: CPT

## 2020-08-03 PROCEDURE — 80307 DRUG TEST PRSMV CHEM ANLYZR: CPT

## 2020-08-03 PROCEDURE — C9803 HOPD COVID-19 SPEC COLLECT: HCPCS | Performed by: EMERGENCY MEDICINE

## 2020-08-03 PROCEDURE — 99285 EMERGENCY DEPT VISIT HI MDM: CPT

## 2020-08-03 PROCEDURE — 93005 ELECTROCARDIOGRAM TRACING: CPT

## 2020-08-03 RX ORDER — MECLIZINE HYDROCHLORIDE 25 MG/1
25 TABLET ORAL 3 TIMES DAILY PRN
Qty: 30 TAB | Refills: 0 | Status: SHIPPED | OUTPATIENT
Start: 2020-08-03

## 2020-08-03 RX ADMIN — THIAMINE HYDROCHLORIDE: 100 INJECTION, SOLUTION INTRAMUSCULAR; INTRAVENOUS at 22:30

## 2020-08-03 RX ADMIN — LIDOCAINE HYDROCHLORIDE 30 ML: 20 SOLUTION OROPHARYNGEAL at 22:30

## 2020-08-03 ASSESSMENT — LIFESTYLE VARIABLES
CONSUMPTION TOTAL: NEGATIVE
TOTAL SCORE: 0
DOES PATIENT WANT TO STOP DRINKING: NO
EVER HAD A DRINK FIRST THING IN THE MORNING TO STEADY YOUR NERVES TO GET RID OF A HANGOVER: NO
HAVE PEOPLE ANNOYED YOU BY CRITICIZING YOUR DRINKING: NO
AVERAGE NUMBER OF DAYS PER WEEK YOU HAVE A DRINK CONTAINING ALCOHOL: 0
HOW MANY TIMES IN THE PAST YEAR HAVE YOU HAD 5 OR MORE DRINKS IN A DAY: 0
ON A TYPICAL DAY WHEN YOU DRINK ALCOHOL HOW MANY DRINKS DO YOU HAVE: 0
HAVE YOU EVER FELT YOU SHOULD CUT DOWN ON YOUR DRINKING: NO
EVER FELT BAD OR GUILTY ABOUT YOUR DRINKING: NO
DO YOU DRINK ALCOHOL: NO
TOTAL SCORE: 0
TOTAL SCORE: 0

## 2020-08-03 ASSESSMENT — PAIN DESCRIPTION - DESCRIPTORS: DESCRIPTORS: ACHING

## 2020-08-03 ASSESSMENT — FIBROSIS 4 INDEX: FIB4 SCORE: 0.74

## 2020-08-04 ENCOUNTER — APPOINTMENT (OUTPATIENT)
Dept: RADIOLOGY | Facility: MEDICAL CENTER | Age: 60
End: 2020-08-04
Attending: EMERGENCY MEDICINE
Payer: MEDICARE

## 2020-08-04 ENCOUNTER — HOSPITAL ENCOUNTER (EMERGENCY)
Facility: MEDICAL CENTER | Age: 60
End: 2020-08-04
Attending: EMERGENCY MEDICINE
Payer: MEDICARE

## 2020-08-04 VITALS
DIASTOLIC BLOOD PRESSURE: 78 MMHG | SYSTOLIC BLOOD PRESSURE: 149 MMHG | WEIGHT: 127 LBS | RESPIRATION RATE: 20 BRPM | HEART RATE: 93 BPM | TEMPERATURE: 97 F | HEIGHT: 64 IN | OXYGEN SATURATION: 93 % | BODY MASS INDEX: 21.68 KG/M2

## 2020-08-04 DIAGNOSIS — S09.90XA CLOSED HEAD INJURY, INITIAL ENCOUNTER: ICD-10-CM

## 2020-08-04 DIAGNOSIS — F10.929 ALCOHOLIC INTOXICATION WITH COMPLICATION (HCC): ICD-10-CM

## 2020-08-04 DIAGNOSIS — S01.81XA FACIAL LACERATION, INITIAL ENCOUNTER: ICD-10-CM

## 2020-08-04 LAB
ALBUMIN SERPL BCP-MCNC: 4.6 G/DL (ref 3.2–4.9)
ALBUMIN/GLOB SERPL: 1.7 G/DL
ALP SERPL-CCNC: 80 U/L (ref 30–99)
ALT SERPL-CCNC: 18 U/L (ref 2–50)
AMPHET UR QL SCN: NEGATIVE
ANION GAP SERPL CALC-SCNC: 19 MMOL/L (ref 7–16)
AST SERPL-CCNC: 23 U/L (ref 12–45)
BARBITURATES UR QL SCN: NEGATIVE
BASOPHILS # BLD AUTO: 0.7 % (ref 0–1.8)
BASOPHILS # BLD: 0.08 K/UL (ref 0–0.12)
BENZODIAZ UR QL SCN: NEGATIVE
BILIRUB SERPL-MCNC: 0.3 MG/DL (ref 0.1–1.5)
BUN SERPL-MCNC: 4 MG/DL (ref 8–22)
BZE UR QL SCN: NEGATIVE
CALCIUM SERPL-MCNC: 8.2 MG/DL (ref 8.5–10.5)
CANNABINOIDS UR QL SCN: POSITIVE
CHLORIDE SERPL-SCNC: 94 MMOL/L (ref 96–112)
CO2 SERPL-SCNC: 21 MMOL/L (ref 20–33)
CREAT SERPL-MCNC: 0.91 MG/DL (ref 0.5–1.4)
EKG IMPRESSION: NORMAL
EOSINOPHIL # BLD AUTO: 0.21 K/UL (ref 0–0.51)
EOSINOPHIL NFR BLD: 2 % (ref 0–6.9)
ERYTHROCYTE [DISTWIDTH] IN BLOOD BY AUTOMATED COUNT: 48.3 FL (ref 35.9–50)
ETHANOL BLD-MCNC: 368.5 MG/DL (ref 0–10.1)
GLOBULIN SER CALC-MCNC: 2.7 G/DL (ref 1.9–3.5)
GLUCOSE SERPL-MCNC: 156 MG/DL (ref 65–99)
HCT VFR BLD AUTO: 45 % (ref 42–52)
HGB BLD-MCNC: 15 G/DL (ref 14–18)
IMM GRANULOCYTES # BLD AUTO: 0.06 K/UL (ref 0–0.11)
IMM GRANULOCYTES NFR BLD AUTO: 0.6 % (ref 0–0.9)
LYMPHOCYTES # BLD AUTO: 0.97 K/UL (ref 1–4.8)
LYMPHOCYTES NFR BLD: 9.1 % (ref 22–41)
MAGNESIUM SERPL-MCNC: 2.5 MG/DL (ref 1.5–2.5)
MCH RBC QN AUTO: 30.9 PG (ref 27–33)
MCHC RBC AUTO-ENTMCNC: 33.3 G/DL (ref 33.7–35.3)
MCV RBC AUTO: 92.6 FL (ref 81.4–97.8)
METHADONE UR QL SCN: NEGATIVE
MONOCYTES # BLD AUTO: 0.55 K/UL (ref 0–0.85)
MONOCYTES NFR BLD AUTO: 5.1 % (ref 0–13.4)
NEUTROPHILS # BLD AUTO: 8.84 K/UL (ref 1.82–7.42)
NEUTROPHILS NFR BLD: 82.5 % (ref 44–72)
NRBC # BLD AUTO: 0 K/UL
NRBC BLD-RTO: 0 /100 WBC
OPIATES UR QL SCN: NEGATIVE
OXYCODONE UR QL SCN: NEGATIVE
PCP UR QL SCN: NEGATIVE
PHOSPHATE SERPL-MCNC: 2.6 MG/DL (ref 2.5–4.5)
PLATELET # BLD AUTO: 383 K/UL (ref 164–446)
PMV BLD AUTO: 8.8 FL (ref 9–12.9)
POTASSIUM SERPL-SCNC: 3.9 MMOL/L (ref 3.6–5.5)
PROPOXYPH UR QL SCN: NEGATIVE
PROT SERPL-MCNC: 7.3 G/DL (ref 6–8.2)
RBC # BLD AUTO: 4.86 M/UL (ref 4.7–6.1)
SODIUM SERPL-SCNC: 134 MMOL/L (ref 135–145)
TROPONIN T SERPL-MCNC: 10 NG/L (ref 6–19)
WBC # BLD AUTO: 10.7 K/UL (ref 4.8–10.8)

## 2020-08-04 PROCEDURE — 80053 COMPREHEN METABOLIC PANEL: CPT

## 2020-08-04 PROCEDURE — 84484 ASSAY OF TROPONIN QUANT: CPT

## 2020-08-04 PROCEDURE — 85025 COMPLETE CBC W/AUTO DIFF WBC: CPT

## 2020-08-04 PROCEDURE — 72131 CT LUMBAR SPINE W/O DYE: CPT

## 2020-08-04 PROCEDURE — 70450 CT HEAD/BRAIN W/O DYE: CPT

## 2020-08-04 PROCEDURE — 93005 ELECTROCARDIOGRAM TRACING: CPT | Performed by: EMERGENCY MEDICINE

## 2020-08-04 PROCEDURE — 700101 HCHG RX REV CODE 250: Performed by: EMERGENCY MEDICINE

## 2020-08-04 PROCEDURE — 71045 X-RAY EXAM CHEST 1 VIEW: CPT

## 2020-08-04 PROCEDURE — 72125 CT NECK SPINE W/O DYE: CPT

## 2020-08-04 PROCEDURE — 303747 HCHG EXTRA SUTURE

## 2020-08-04 PROCEDURE — 80307 DRUG TEST PRSMV CHEM ANLYZR: CPT

## 2020-08-04 PROCEDURE — 84100 ASSAY OF PHOSPHORUS: CPT

## 2020-08-04 PROCEDURE — 99284 EMERGENCY DEPT VISIT MOD MDM: CPT

## 2020-08-04 PROCEDURE — 304999 HCHG REPAIR-SIMPLE/INTERMED LEVEL 1

## 2020-08-04 PROCEDURE — 72128 CT CHEST SPINE W/O DYE: CPT

## 2020-08-04 PROCEDURE — 83735 ASSAY OF MAGNESIUM: CPT

## 2020-08-04 RX ORDER — LIDOCAINE HYDROCHLORIDE AND EPINEPHRINE 10; 10 MG/ML; UG/ML
20 INJECTION, SOLUTION INFILTRATION; PERINEURAL ONCE
Status: COMPLETED | OUTPATIENT
Start: 2020-08-04 | End: 2020-08-04

## 2020-08-04 RX ADMIN — LIDOCAINE HYDROCHLORIDE,EPINEPHRINE BITARTRATE 20 ML: 10; .01 INJECTION, SOLUTION INFILTRATION; PERINEURAL at 11:16

## 2020-08-04 ASSESSMENT — FIBROSIS 4 INDEX: FIB4 SCORE: 0.74

## 2020-08-04 NOTE — DISCHARGE INSTRUCTIONS
Take your medications as prescribed    Do not take meclizine while drinking alcohol    Reduce your alcohol intake    Follow-up with your primary care doctor tomorrow    Return to the ER for worsening symptoms, recurrent chest pain, or other concerns
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
## 2020-08-04 NOTE — ED NOTES
Pt in the hallway demanding pain meds, pt instructed to return to the room due to his recent covid dx, pt upset and yelling in the room, pt refusing to keep monitors attached

## 2020-08-04 NOTE — ED NOTES
Provided dry clothing for patient.  Pt verbalizes understanding of discharge and follow-up instructions.  PIV removed.  All questions answered.  Ambulates to discharge with steady gait.

## 2020-08-04 NOTE — ED NOTES
Pt refusing leave on a gown, pt agitated the door to his room must remained closed, charge nurse notified

## 2020-08-04 NOTE — DISCHARGE INSTRUCTIONS
You will need to return to your primary care physician, urgent care this emergency department and 7 to 10 days for suture removal.

## 2020-08-04 NOTE — ED PROVIDER NOTES
"ED Provider Note    CHIEF COMPLAINT  Chief Complaint   Patient presents with   • Chest Pain   • Cough       HPI  Rajiv Sam is a 59 y.o. male with a history of coronary artery disease, PTSD, and hypertension who presents complaining of chest pain and cough.    Patient states he had chest pain that occurred at rest located in his epigastric/substernal area that was constant for 2 hours, nonradiating at 8:00 this morning.  Patient denies associated shortness of breath, nausea, vomiting, diaphoresis.  He also complains of feeling dizzy and requests meclizine which he has run out of and has not had in 4 days.  He takes this as needed.  He also reports feeling hungry and admits to alcohol intoxication.  Patient also requests something for pain.  He reports chronic right hip pain following a fall several years ago.  The patient was diagnosed with COVID several weeks ago.  He continues to have a cough but denies hemoptysis, fever, chills.      ALLERGIES  Allergies   Allergen Reactions   • Pcn [Penicillins] Rash     Rash       CURRENT MEDICATIONS  Depakote, aspirin, Lipitor, Norvasc    PAST MEDICAL HISTORY   has a past medical history of Hypertension, Old MI (myocardial infarction), Psychiatric disorder, and PTSD (post-traumatic stress disorder).    SURGICAL HISTORY   has a past surgical history that includes cardiac cath, left heart.    SOCIAL HISTORY  Social History     Tobacco Use   • Smoking status: Current Every Day Smoker   • Smokeless tobacco: Never Used   Substance and Sexual Activity   • Alcohol use: No   • Drug use: Yes     Types: Inhaled     Comment: pot   • Sexual activity: Not on file         REVIEW OF SYSTEMS  See HPI for further details.  All other systems are negative except as above in HPI.      PHYSICAL EXAM  VITAL SIGNS: /74   Pulse 79   Temp 36.9 °C (98.5 °F)   Resp 16   Ht 1.626 m (5' 4\")   Wt 57.6 kg (127 lb)   SpO2 98%   BMI 21.80 kg/m²     General:  WDWN, nontoxic appearing in NAD; " A+Ox3; V/S as above  Skin: warm and dry; good color; no rash  HEENT: NCAT; EOMs intact; PERRL; no scleral icterus   Neck: FROM; no meningismus, no JVD  Cardiovascular: Regular heart rate and rhythm.  No murmurs, rubs, or gallops; pulses 2+ bilaterally radially and DP areas; chest wall tenderness  Lungs: Clear to auscultation with good air movement bilaterally.  No wheezes, rhonchi, or rales.   Abdomen: BS present; soft; NTND; no rebound, guarding, or rigidity.  No organomegaly or pulsatile mass; no CVAT   Extremities: CORDON x 4; no e/o trauma; no pedal edema; neg Romi's  Neurologic: CNs III-XII grossly intact; speech clear; distal sensation intact; strength 5/5 UE/LEs  Psychiatric: Appropriate affect, normal mood    LABS  Results for orders placed or performed during the hospital encounter of 08/03/20   CBC with Differential   Result Value Ref Range    WBC 8.7 4.8 - 10.8 K/uL    RBC 4.41 (L) 4.70 - 6.10 M/uL    Hemoglobin 13.8 (L) 14.0 - 18.0 g/dL    Hematocrit 40.0 (L) 42.0 - 52.0 %    MCV 90.7 81.4 - 97.8 fL    MCH 31.3 27.0 - 33.0 pg    MCHC 34.5 33.7 - 35.3 g/dL    RDW 46.5 35.9 - 50.0 fL    Platelet Count 385 164 - 446 K/uL    MPV 8.8 (L) 9.0 - 12.9 fL    Neutrophils-Polys 63.30 44.00 - 72.00 %    Lymphocytes 22.30 22.00 - 41.00 %    Monocytes 7.30 0.00 - 13.40 %    Eosinophils 5.70 0.00 - 6.90 %    Basophils 0.90 0.00 - 1.80 %    Immature Granulocytes 0.50 0.00 - 0.90 %    Nucleated RBC 0.00 /100 WBC    Neutrophils (Absolute) 5.51 1.82 - 7.42 K/uL    Lymphs (Absolute) 1.94 1.00 - 4.80 K/uL    Monos (Absolute) 0.64 0.00 - 0.85 K/uL    Eos (Absolute) 0.50 0.00 - 0.51 K/uL    Baso (Absolute) 0.08 0.00 - 0.12 K/uL    Immature Granulocytes (abs) 0.04 0.00 - 0.11 K/uL    NRBC (Absolute) 0.00 K/uL   Complete Metabolic Panel (CMP)   Result Value Ref Range    Sodium 132 (L) 135 - 145 mmol/L    Potassium 3.5 (L) 3.6 - 5.5 mmol/L    Chloride 94 (L) 96 - 112 mmol/L    Co2 15 (L) 20 - 33 mmol/L    Anion Gap 23.0 (H) 7.0 -  16.0    Glucose 145 (H) 65 - 99 mg/dL    Bun 4 (L) 8 - 22 mg/dL    Creatinine 0.85 0.50 - 1.40 mg/dL    Calcium 8.4 (L) 8.5 - 10.5 mg/dL    AST(SGOT) 22 12 - 45 U/L    ALT(SGPT) 20 2 - 50 U/L    Alkaline Phosphatase 84 30 - 99 U/L    Total Bilirubin 0.3 0.1 - 1.5 mg/dL    Albumin 4.8 3.2 - 4.9 g/dL    Total Protein 7.3 6.0 - 8.2 g/dL    Globulin 2.5 1.9 - 3.5 g/dL    A-G Ratio 1.9 g/dL   Troponin   Result Value Ref Range    Troponin T 7 6 - 19 ng/L   ESTIMATED GFR   Result Value Ref Range    GFR If African American >60 >60 mL/min/1.73 m 2    GFR If Non African American >60 >60 mL/min/1.73 m 2   DIAGNOSTIC ALCOHOL   Result Value Ref Range    Diagnostic Alcohol 322.6 (H) 0.0 - 10.1 mg/dL   EKG   Result Value Ref Range    Report       [facility] Dept[facility]    Test Date:  2020  Pt Name:    EVERARDO JACKSON                Department: ER  MRN:        [id number]                      Room:       [facility]  Gender:     Male                         Technician: [id number]  :        1960                   Requested By:ER TRIAGE PROTOCOL  Order #:    [id number]                    Reading MD: GADIEL EVANS MD    Measurements  Intervals                                Axis  Rate:       95                           P:          65  ME:         144                          QRS:        -179  QRSD:       120                          T:          46  QT:         400  QTc:        503    Interpretive Statements  SINUS RHYTHM  RBBB AND LPFB  BORDERLINE INFERIOR Q WAVES  BASELINE WANDER IN LEAD(S) V1  Compared to ECG 2020 18:59:21  No significant changes  Electronically Signed On 8-3-2020 21:59:24 PDT by GADIEL EVANS MD         IMAGING  DX-CHEST-PORTABLE (1 VIEW)   Final Result         1.  No focal infiltrates.   2.  Perihilar interstitial prominence and bronchial wall cuffing, appearance suggests changes of underlying bronchial inflammation, consider bronchitis.            MEDICAL RECORD  I have  reviewed patient's medical record and pertinent results are listed below.      COURSE & MEDICAL DECISION MAKING  I have reviewed any medical record information, laboratory studies and radiographic results as noted.    Rajiv Sam is a 59 y.o. male who presents complaining of chest pain this morning for 2 hours.  Prior to my seeing the patient, I ordered a GI cocktail for possible GERD.  The patient's pain has not recurred since 8 AM.  He denies associated symptoms.      EKG demonstrates no acute ST changes    Appropriate PPE was worn at all times while interacting with the patient, including goggles, N95 mask, and surgical mask.    Chest x-ray demonstrates possible bronchitis.  The patient denies any sputum production.  He is afebrile.  He is not hypoxic.  I do not hear any adventitious breath sounds.  This may be COVID related from his diagnosis in the last several weeks.  No evidence of pneumonia.    Labs demonstrate a diagnostic alcohol of 322, sodium 132, potassium 3.5, chloride 94, CO2 15, anion gap of 23, glucose 145.  He also has a mild anemia of 13.8.    Detox bolus was ordered for possible dehydration related to patient's alcohol intoxication.    HEART Score:  0-3 <2% risk of MCE within 6 weeks      Pt was re-evaluated.  His left forearm IV site appears to be infiltrated.  He denies any current chest pain or chest pain since 8 AM this morning.  He states he does not drink very often but had alcohol today to reduce his symptoms.  I advised him of his results including evidence of dehydration, intoxication, and a mild anemia.  Advised him of the plan to discharge.  He requested meclizine for his vertigo.  I have written a prescription for this.  Patient was given return precautions.  His gait was steady and the patient appeared clinically sober.  He was advised to follow-up with his primary care doctor and cardiologist in [place] tomorrow.    FINAL IMPRESSION  1. Chest pain, unspecified type     2. Alcoholic  intoxication without complication (HCC)         Electronically signed by: Mary Yu M.D., 8/3/2020 9:58 PM

## 2020-08-04 NOTE — ED TRIAGE NOTES
"BIB [facility], c/o of chest pressure that started at rest, recent dx of Covid, pt was given 3 Nitro and 324mg ASA in route with \"some relief\", pt admits to 4 beers this evening, hx of 7 MI's   "

## 2020-08-04 NOTE — ED NOTES
Patient is currently resting comfortably in room.  No visible signs or symptoms of distress noted.  Family at bedside.  Will continue to monitor.

## 2020-08-04 NOTE — ED TRIAGE NOTES
".  Chief Complaint   Patient presents with   • Syncope   • Fall     (-) LOC, (+) hit head, taking ASA     ./89   Pulse 88   Temp 36.1 °C (97 °F) (Temporal)   Ht 1.626 m (5' 4\")   Wt 57.6 kg (127 lb)   SpO2 (!) 87%   BMI 21.80 kg/m²     BIBA after reported syncopal event and fall into river, hit head on rock per report, patient takes ASA per report, Code TBI initiated, patient seen by ERP, to CT w/TNTL, (-) LOC per report, facial laceration, patient incontinent of stool, cleaned and changed, hx of ETOH abuse, recent hx of COVID (+), placed on droplet precautions, mask in place on arrival to ED.  "

## 2020-08-04 NOTE — ED NOTES
Patient incontinent of stool.  Ambulatory to BR w/steady gait.  Cleaned self.  Placed back on monitor.  Urine sent to lab.

## 2022-09-01 NOTE — ED NOTES
"Pt to ED via [facility] from [facility] for medical clearance, c/o ALOC. Pt has legal 2000 in place and has sitter at bedside. Per staff at [facility], pt arrived to their facility \"extremelty altered, couldn't walk or talk and seems heavily sedated.\" This RN consulted with previous ED RN who cared for this pt, while in [facility] ED and she reports little change in pt. Pt was able to get up and transfer w/o assistance from EMS gurney to hospital bed. Pt answers questions appropriately but is oriented to self. Pt denies recent head trauma or LOC. Pupils are JULIANNA; no obvious sign of injury.   " Statement Selected

## 2024-09-06 ENCOUNTER — APPOINTMENT (OUTPATIENT)
Dept: RADIOLOGY | Facility: MEDICAL CENTER | Age: 64
End: 2024-09-06
Payer: MEDICARE

## 2024-09-06 ENCOUNTER — APPOINTMENT (OUTPATIENT)
Dept: RADIOLOGY | Facility: MEDICAL CENTER | Age: 64
End: 2024-09-06
Attending: EMERGENCY MEDICINE
Payer: MEDICARE

## 2024-09-06 ENCOUNTER — HOSPITAL ENCOUNTER (EMERGENCY)
Facility: MEDICAL CENTER | Age: 64
End: 2024-09-06
Attending: EMERGENCY MEDICINE
Payer: MEDICARE

## 2024-09-06 VITALS
BODY MASS INDEX: 19.8 KG/M2 | DIASTOLIC BLOOD PRESSURE: 106 MMHG | OXYGEN SATURATION: 93 % | WEIGHT: 115.96 LBS | SYSTOLIC BLOOD PRESSURE: 175 MMHG | RESPIRATION RATE: 12 BRPM | TEMPERATURE: 98.2 F | HEIGHT: 64 IN | HEART RATE: 91 BPM

## 2024-09-06 DIAGNOSIS — Z78.9 ALCOHOL USE: ICD-10-CM

## 2024-09-06 DIAGNOSIS — R91.8 PULMONARY NODULES: ICD-10-CM

## 2024-09-06 DIAGNOSIS — R07.9 ACUTE CHEST PAIN: ICD-10-CM

## 2024-09-06 LAB
ALBUMIN SERPL BCP-MCNC: 4.3 G/DL (ref 3.2–4.9)
ALBUMIN/GLOB SERPL: 1.4 G/DL
ALP SERPL-CCNC: 101 U/L (ref 30–99)
ALT SERPL-CCNC: 24 U/L (ref 2–50)
ANION GAP SERPL CALC-SCNC: 15 MMOL/L (ref 7–16)
AST SERPL-CCNC: 31 U/L (ref 12–45)
BASOPHILS # BLD AUTO: 1.3 % (ref 0–1.8)
BASOPHILS # BLD: 0.1 K/UL (ref 0–0.12)
BILIRUB SERPL-MCNC: 0.2 MG/DL (ref 0.1–1.5)
BUN SERPL-MCNC: 10 MG/DL (ref 8–22)
CALCIUM ALBUM COR SERPL-MCNC: 9.4 MG/DL (ref 8.5–10.5)
CALCIUM SERPL-MCNC: 9.6 MG/DL (ref 8.5–10.5)
CHLORIDE SERPL-SCNC: 102 MMOL/L (ref 96–112)
CO2 SERPL-SCNC: 24 MMOL/L (ref 20–33)
CREAT SERPL-MCNC: 0.83 MG/DL (ref 0.5–1.4)
D DIMER PPP IA.FEU-MCNC: 0.9 UG/ML (FEU) (ref 0–0.5)
EKG IMPRESSION: NORMAL
EOSINOPHIL # BLD AUTO: 0.43 K/UL (ref 0–0.51)
EOSINOPHIL NFR BLD: 5.7 % (ref 0–6.9)
ERYTHROCYTE [DISTWIDTH] IN BLOOD BY AUTOMATED COUNT: 48.4 FL (ref 35.9–50)
ETHANOL BLD-MCNC: 46.4 MG/DL
GFR SERPLBLD CREATININE-BSD FMLA CKD-EPI: 99 ML/MIN/1.73 M 2
GLOBULIN SER CALC-MCNC: 3 G/DL (ref 1.9–3.5)
GLUCOSE SERPL-MCNC: 166 MG/DL (ref 65–99)
HCT VFR BLD AUTO: 46.4 % (ref 42–52)
HGB BLD-MCNC: 15.9 G/DL (ref 14–18)
IMM GRANULOCYTES # BLD AUTO: 0.06 K/UL (ref 0–0.11)
IMM GRANULOCYTES NFR BLD AUTO: 0.8 % (ref 0–0.9)
LIPASE SERPL-CCNC: 278 U/L (ref 11–82)
LYMPHOCYTES # BLD AUTO: 1.49 K/UL (ref 1–4.8)
LYMPHOCYTES NFR BLD: 19.6 % (ref 22–41)
MCH RBC QN AUTO: 31.9 PG (ref 27–33)
MCHC RBC AUTO-ENTMCNC: 34.3 G/DL (ref 32.3–36.5)
MCV RBC AUTO: 93.2 FL (ref 81.4–97.8)
MONOCYTES # BLD AUTO: 0.46 K/UL (ref 0–0.85)
MONOCYTES NFR BLD AUTO: 6.1 % (ref 0–13.4)
NEUTROPHILS # BLD AUTO: 5.05 K/UL (ref 1.82–7.42)
NEUTROPHILS NFR BLD: 66.5 % (ref 44–72)
NRBC # BLD AUTO: 0 K/UL
NRBC BLD-RTO: 0 /100 WBC (ref 0–0.2)
PLATELET # BLD AUTO: 322 K/UL (ref 164–446)
PMV BLD AUTO: 9.3 FL (ref 9–12.9)
POTASSIUM SERPL-SCNC: 3.9 MMOL/L (ref 3.6–5.5)
PROT SERPL-MCNC: 7.3 G/DL (ref 6–8.2)
RBC # BLD AUTO: 4.98 M/UL (ref 4.7–6.1)
SODIUM SERPL-SCNC: 141 MMOL/L (ref 135–145)
TROPONIN T SERPL-MCNC: 11 NG/L (ref 6–19)
TROPONIN T SERPL-MCNC: 12 NG/L (ref 6–19)
WBC # BLD AUTO: 7.6 K/UL (ref 4.8–10.8)

## 2024-09-06 PROCEDURE — 82077 ASSAY SPEC XCP UR&BREATH IA: CPT

## 2024-09-06 PROCEDURE — 99285 EMERGENCY DEPT VISIT HI MDM: CPT

## 2024-09-06 PROCEDURE — 83690 ASSAY OF LIPASE: CPT

## 2024-09-06 PROCEDURE — 93005 ELECTROCARDIOGRAM TRACING: CPT | Performed by: EMERGENCY MEDICINE

## 2024-09-06 PROCEDURE — 71045 X-RAY EXAM CHEST 1 VIEW: CPT

## 2024-09-06 PROCEDURE — 71275 CT ANGIOGRAPHY CHEST: CPT

## 2024-09-06 PROCEDURE — 80053 COMPREHEN METABOLIC PANEL: CPT

## 2024-09-06 PROCEDURE — 84484 ASSAY OF TROPONIN QUANT: CPT

## 2024-09-06 PROCEDURE — 36415 COLL VENOUS BLD VENIPUNCTURE: CPT

## 2024-09-06 PROCEDURE — 85379 FIBRIN DEGRADATION QUANT: CPT

## 2024-09-06 PROCEDURE — 700117 HCHG RX CONTRAST REV CODE 255: Performed by: EMERGENCY MEDICINE

## 2024-09-06 PROCEDURE — 93005 ELECTROCARDIOGRAM TRACING: CPT

## 2024-09-06 PROCEDURE — 85025 COMPLETE CBC W/AUTO DIFF WBC: CPT

## 2024-09-06 RX ADMIN — IOHEXOL 72 ML: 350 INJECTION, SOLUTION INTRAVENOUS at 21:00

## 2024-09-06 ASSESSMENT — PAIN DESCRIPTION - PAIN TYPE: TYPE: ACUTE PAIN

## 2024-09-07 NOTE — ED TRIAGE NOTES
".  Chief Complaint   Patient presents with    Chest Pain     Pt BIBA from home for mid sternal chest pain that started around 1530, non radiating.     Pt reports previous MI w/ stents          .Patient wheeled to triage for above complaint. Patient aware to notify RN of any changes in symptoms. Patient educated on triage process. A&O x 4 and placed in waiting room.     Labs collected in triage    .BP (!) 135/95   Pulse 90   Temp 36 °C (96.8 °F) (Temporal)   Resp 16   Ht 1.626 m (5' 4\")   Wt 52.6 kg (115 lb 15.4 oz)   SpO2 98%   BMI 19.90 kg/m²     "

## 2024-09-07 NOTE — ED NOTES
PT was wheeled back in wheelchair and was able to transfer himself from chair to gurney. He was put on the monitor.

## 2024-12-11 ENCOUNTER — APPOINTMENT (OUTPATIENT)
Dept: RADIOLOGY | Facility: MEDICAL CENTER | Age: 64
End: 2024-12-11
Attending: EMERGENCY MEDICINE
Payer: MEDICARE

## 2024-12-11 ENCOUNTER — HOSPITAL ENCOUNTER (EMERGENCY)
Facility: MEDICAL CENTER | Age: 64
End: 2024-12-11
Payer: MEDICARE

## 2024-12-11 VITALS
OXYGEN SATURATION: 96 % | TEMPERATURE: 97.6 F | DIASTOLIC BLOOD PRESSURE: 99 MMHG | HEIGHT: 64 IN | BODY MASS INDEX: 20.49 KG/M2 | HEART RATE: 67 BPM | WEIGHT: 120 LBS | RESPIRATION RATE: 18 BRPM | SYSTOLIC BLOOD PRESSURE: 158 MMHG

## 2024-12-11 PROCEDURE — 302449 STATCHG TRIAGE ONLY (STATISTIC)

## 2024-12-11 ASSESSMENT — FIBROSIS 4 INDEX: FIB4 SCORE: 1.22

## 2024-12-11 ASSESSMENT — PAIN DESCRIPTION - PAIN TYPE: TYPE: ACUTE PAIN

## 2024-12-12 NOTE — ED NOTES
Patient called in lobby x2 with no response. All waiting areas checked. Unable to locate patient at this time.

## 2024-12-12 NOTE — ED TRIAGE NOTES
"Chief Complaint   Patient presents with    Foot Pain     From [facility], pt bib EMS after right foot was run over by a wheelchair.  Pt reports that he has some movement but has difficulty putting weight on it.  No swelling noted.  Pt placed in wheelchair on arrival       Patient to triage via wheelchair, AAOx4, Appropriate precautions in place.     Explained wait time and triage process. Placed back in ED lobby. Told to notify ED tech or RN of any changes, verbalized understanding.    BP (!) 158/99   Pulse 67   Temp 36.4 °C (97.6 °F) (Temporal)   Resp 18   Ht 1.626 m (5' 4\")   Wt 54.4 kg (120 lb)   SpO2 96%   BMI 20.60 kg/m²     "